# Patient Record
Sex: MALE | Race: WHITE | NOT HISPANIC OR LATINO | Employment: OTHER | URBAN - NONMETROPOLITAN AREA
[De-identification: names, ages, dates, MRNs, and addresses within clinical notes are randomized per-mention and may not be internally consistent; named-entity substitution may affect disease eponyms.]

---

## 2017-01-01 ENCOUNTER — APPOINTMENT (OUTPATIENT)
Dept: GENERAL RADIOLOGY | Facility: HOSPITAL | Age: 77
End: 2017-01-01

## 2017-01-01 ENCOUNTER — APPOINTMENT (OUTPATIENT)
Dept: CT IMAGING | Facility: HOSPITAL | Age: 77
End: 2017-01-01

## 2017-01-01 ENCOUNTER — OUTSIDE FACILITY SERVICE (OUTPATIENT)
Dept: FAMILY MEDICINE CLINIC | Facility: CLINIC | Age: 77
End: 2017-01-01

## 2017-01-01 ENCOUNTER — APPOINTMENT (OUTPATIENT)
Dept: SLEEP MEDICINE | Facility: HOSPITAL | Age: 77
End: 2017-01-01
Attending: FAMILY MEDICINE

## 2017-01-01 ENCOUNTER — HOSPITAL ENCOUNTER (EMERGENCY)
Facility: HOSPITAL | Age: 77
Discharge: LEFT BEFORE DISCHARGE | End: 2017-09-24
Attending: STUDENT IN AN ORGANIZED HEALTH CARE EDUCATION/TRAINING PROGRAM | Admitting: STUDENT IN AN ORGANIZED HEALTH CARE EDUCATION/TRAINING PROGRAM

## 2017-01-01 ENCOUNTER — HOSPITAL ENCOUNTER (INPATIENT)
Facility: HOSPITAL | Age: 77
LOS: 6 days | Discharge: NURSING, REHAB OR LONG-TERM CARE | End: 2017-10-17
Attending: EMERGENCY MEDICINE | Admitting: INTERNAL MEDICINE

## 2017-01-01 ENCOUNTER — HOSPITAL ENCOUNTER (EMERGENCY)
Facility: HOSPITAL | Age: 77
Discharge: NURSING, REHAB OR LONG-TERM CARE | End: 2017-10-19
Attending: EMERGENCY MEDICINE | Admitting: EMERGENCY MEDICINE

## 2017-01-01 ENCOUNTER — APPOINTMENT (OUTPATIENT)
Dept: CARDIOLOGY | Facility: HOSPITAL | Age: 77
End: 2017-01-01
Attending: INTERNAL MEDICINE

## 2017-01-01 ENCOUNTER — HOSPITAL ENCOUNTER (INPATIENT)
Facility: HOSPITAL | Age: 77
LOS: 1 days | End: 2017-10-21
Attending: EMERGENCY MEDICINE | Admitting: FAMILY MEDICINE

## 2017-01-01 VITALS
BODY MASS INDEX: 15.28 KG/M2 | DIASTOLIC BLOOD PRESSURE: 72 MMHG | HEART RATE: 72 BPM | WEIGHT: 106.7 LBS | HEIGHT: 70 IN | OXYGEN SATURATION: 98 % | RESPIRATION RATE: 18 BRPM | TEMPERATURE: 97.8 F | SYSTOLIC BLOOD PRESSURE: 100 MMHG

## 2017-01-01 VITALS
BODY MASS INDEX: 13.54 KG/M2 | WEIGHT: 100 LBS | HEIGHT: 72 IN | RESPIRATION RATE: 20 BRPM | HEART RATE: 92 BPM | TEMPERATURE: 97.4 F | DIASTOLIC BLOOD PRESSURE: 76 MMHG | SYSTOLIC BLOOD PRESSURE: 104 MMHG | OXYGEN SATURATION: 90 %

## 2017-01-01 VITALS
WEIGHT: 106.8 LBS | DIASTOLIC BLOOD PRESSURE: 71 MMHG | SYSTOLIC BLOOD PRESSURE: 96 MMHG | BODY MASS INDEX: 13.71 KG/M2 | HEART RATE: 92 BPM | TEMPERATURE: 97.6 F | HEIGHT: 74 IN | RESPIRATION RATE: 18 BRPM | OXYGEN SATURATION: 98 %

## 2017-01-01 VITALS
DIASTOLIC BLOOD PRESSURE: 36 MMHG | OXYGEN SATURATION: 58 % | HEART RATE: 30 BPM | SYSTOLIC BLOOD PRESSURE: 71 MMHG | RESPIRATION RATE: 17 BRPM | WEIGHT: 113.38 LBS | TEMPERATURE: 98 F | HEIGHT: 74 IN | BODY MASS INDEX: 14.55 KG/M2

## 2017-01-01 DIAGNOSIS — I46.9 CARDIAC ARREST (HCC): Primary | ICD-10-CM

## 2017-01-01 DIAGNOSIS — I95.9 TRANSIENT HYPOTENSION: Primary | ICD-10-CM

## 2017-01-01 DIAGNOSIS — R79.89 ELEVATED LACTIC ACID LEVEL: ICD-10-CM

## 2017-01-01 DIAGNOSIS — S01.01XA SCALP LACERATION, INITIAL ENCOUNTER: ICD-10-CM

## 2017-01-01 DIAGNOSIS — Z74.09 IMPAIRED FUNCTIONAL MOBILITY, BALANCE, GAIT, AND ENDURANCE: ICD-10-CM

## 2017-01-01 DIAGNOSIS — J18.9 PNEUMONIA OF LEFT LOWER LOBE DUE TO INFECTIOUS ORGANISM: ICD-10-CM

## 2017-01-01 DIAGNOSIS — Z78.9 IMPAIRED MOBILITY AND ADLS: ICD-10-CM

## 2017-01-01 DIAGNOSIS — Z74.09 IMPAIRED MOBILITY AND ADLS: ICD-10-CM

## 2017-01-01 DIAGNOSIS — I21.4 NSTEMI (NON-ST ELEVATED MYOCARDIAL INFARCTION) (HCC): ICD-10-CM

## 2017-01-01 DIAGNOSIS — J18.9 PNEUMONIA OF RIGHT LOWER LOBE DUE TO INFECTIOUS ORGANISM: ICD-10-CM

## 2017-01-01 DIAGNOSIS — R64 CACHEXIA (HCC): ICD-10-CM

## 2017-01-01 DIAGNOSIS — J18.9 PNEUMONIA OF BOTH LOWER LOBES DUE TO INFECTIOUS ORGANISM: ICD-10-CM

## 2017-01-01 DIAGNOSIS — G93.40 ACUTE ENCEPHALOPATHY: Primary | ICD-10-CM

## 2017-01-01 DIAGNOSIS — S09.90XA HEAD INJURY DUE TO TRAUMA, INITIAL ENCOUNTER: ICD-10-CM

## 2017-01-01 DIAGNOSIS — R55 SYNCOPE AND COLLAPSE: Primary | ICD-10-CM

## 2017-01-01 LAB
ACTH PLAS-MCNC: 38.3 PG/ML (ref 7.2–63.3)
ALBUMIN SERPL-MCNC: 2.1 G/DL (ref 3.5–5)
ALBUMIN SERPL-MCNC: 2.3 G/DL (ref 3.5–5)
ALBUMIN SERPL-MCNC: 2.3 G/DL (ref 3.5–5)
ALBUMIN SERPL-MCNC: 2.4 G/DL (ref 3.5–5)
ALBUMIN SERPL-MCNC: 2.6 G/DL (ref 3.5–5)
ALBUMIN SERPL-MCNC: 2.7 G/DL (ref 3.5–5)
ALBUMIN SERPL-MCNC: 2.9 G/DL (ref 3.5–5)
ALBUMIN SERPL-MCNC: 3.1 G/DL (ref 3.5–5)
ALBUMIN SERPL-MCNC: 3.6 G/DL (ref 3.5–5)
ALBUMIN/GLOB SERPL: 0.8 G/DL (ref 1–2)
ALBUMIN/GLOB SERPL: 0.9 G/DL (ref 1–2)
ALP SERPL-CCNC: 129 U/L (ref 38–126)
ALP SERPL-CCNC: 132 U/L (ref 38–126)
ALP SERPL-CCNC: 147 U/L (ref 38–126)
ALP SERPL-CCNC: 153 U/L (ref 38–126)
ALP SERPL-CCNC: 99 U/L (ref 38–126)
ALT SERPL W P-5'-P-CCNC: 31 U/L (ref 13–69)
ALT SERPL W P-5'-P-CCNC: 36 U/L (ref 13–69)
ALT SERPL W P-5'-P-CCNC: 37 U/L (ref 13–69)
ALT SERPL W P-5'-P-CCNC: 52 U/L (ref 13–69)
ALT SERPL W P-5'-P-CCNC: 74 U/L (ref 13–69)
AMMONIA BLD-SCNC: <9 UMOL/L (ref 9–30)
ANION GAP SERPL CALCULATED.3IONS-SCNC: 10 MMOL/L
ANION GAP SERPL CALCULATED.3IONS-SCNC: 10.1 MMOL/L
ANION GAP SERPL CALCULATED.3IONS-SCNC: 10.6 MMOL/L
ANION GAP SERPL CALCULATED.3IONS-SCNC: 11.2 MMOL/L
ANION GAP SERPL CALCULATED.3IONS-SCNC: 16.1 MMOL/L
ANION GAP SERPL CALCULATED.3IONS-SCNC: 16.1 MMOL/L
ANION GAP SERPL CALCULATED.3IONS-SCNC: 16.5 MMOL/L
ANION GAP SERPL CALCULATED.3IONS-SCNC: 17.9 MMOL/L
ANION GAP SERPL CALCULATED.3IONS-SCNC: 19.1 MMOL/L
ANION GAP SERPL CALCULATED.3IONS-SCNC: 8.2 MMOL/L
ANION GAP SERPL CALCULATED.3IONS-SCNC: 9.6 MMOL/L
ANISOCYTOSIS BLD QL: ABNORMAL
ANISOCYTOSIS BLD QL: ABNORMAL
APAP SERPL-MCNC: <10 MCG/ML
APTT PPP: 21.4 SECONDS (ref 25–36)
ARTERIAL PATENCY WRIST A: ABNORMAL
ARTERIAL PATENCY WRIST A: NORMAL
ARTERIAL PATENCY WRIST A: POSITIVE
AST SERPL-CCNC: 107 U/L (ref 15–46)
AST SERPL-CCNC: 26 U/L (ref 15–46)
AST SERPL-CCNC: 35 U/L (ref 15–46)
AST SERPL-CCNC: 45 U/L (ref 15–46)
AST SERPL-CCNC: 57 U/L (ref 15–46)
BACTERIA SPEC AEROBE CULT: ABNORMAL
BACTERIA SPEC AEROBE CULT: ABNORMAL
BACTERIA SPEC AEROBE CULT: NORMAL
BACTERIA UR QL AUTO: ABNORMAL /HPF
BASE EXCESS BLDA CALC-SCNC: -0.3 MMOL/L
BASE EXCESS BLDA CALC-SCNC: -2.6 MMOL/L
BASE EXCESS BLDA CALC-SCNC: -3 MMOL/L
BASE EXCESS BLDA CALC-SCNC: 0.6 MMOL/L
BASE EXCESS BLDA CALC-SCNC: 2.1 MMOL/L
BASOPHILS # BLD AUTO: 0.01 10*3/MM3 (ref 0–0.2)
BASOPHILS # BLD AUTO: 0.02 10*3/MM3 (ref 0–0.2)
BASOPHILS # BLD AUTO: 0.02 10*3/MM3 (ref 0–0.2)
BASOPHILS # BLD AUTO: 0.03 10*3/MM3 (ref 0–0.2)
BASOPHILS NFR BLD AUTO: 0.1 % (ref 0–2.5)
BASOPHILS NFR BLD AUTO: 0.2 % (ref 0–2.5)
BASOPHILS NFR BLD AUTO: 0.2 % (ref 0–2.5)
BDY SITE: ABNORMAL
BDY SITE: NORMAL
BH CV ECHO MEAS - % IVS THICK: 55 %
BH CV ECHO MEAS - % LVPW THICK: -14.3 %
BH CV ECHO MEAS - AO ACC SLOPE: 700.9 CM/SEC^2
BH CV ECHO MEAS - AO ACC TIME: 0.08 SEC
BH CV ECHO MEAS - AO ROOT AREA (BSA CORRECTED): 2.3
BH CV ECHO MEAS - AO ROOT AREA: 10 CM^2
BH CV ECHO MEAS - AO ROOT DIAM: 3.6 CM
BH CV ECHO MEAS - BSA(HAYCOCK): 1.4 M^2
BH CV ECHO MEAS - BSA: 1.6 M^2
BH CV ECHO MEAS - BZI_BMI: 11.9 KILOGRAMS/M^2
BH CV ECHO MEAS - BZI_METRIC_HEIGHT: 188 CM
BH CV ECHO MEAS - BZI_METRIC_WEIGHT: 42.2 KG
BH CV ECHO MEAS - CONTRAST EF 4CH: 44 ML/M^2
BH CV ECHO MEAS - EDV(CUBED): 71.4 ML
BH CV ECHO MEAS - EDV(MOD-SP4): 84 ML
BH CV ECHO MEAS - EDV(TEICH): 76.3 ML
BH CV ECHO MEAS - EF(CUBED): 59.5 %
BH CV ECHO MEAS - EF(TEICH): 51.6 %
BH CV ECHO MEAS - ESV(CUBED): 28.9 ML
BH CV ECHO MEAS - ESV(MOD-SP4): 47 ML
BH CV ECHO MEAS - ESV(TEICH): 37 ML
BH CV ECHO MEAS - FS: 26 %
BH CV ECHO MEAS - IVS/LVPW: 0.95
BH CV ECHO MEAS - IVSD: 0.86 CM
BH CV ECHO MEAS - IVSS: 1.3 CM
BH CV ECHO MEAS - LA DIMENSION: 2.8 CM
BH CV ECHO MEAS - LA/AO: 0.79
BH CV ECHO MEAS - LV DIASTOLIC VOL/BSA (35-75): 53.5 ML/M^2
BH CV ECHO MEAS - LV MASS(C)D: 113.8 GRAMS
BH CV ECHO MEAS - LV MASS(C)DI: 72.5 GRAMS/M^2
BH CV ECHO MEAS - LV MASS(C)S: 92.6 GRAMS
BH CV ECHO MEAS - LV MASS(C)SI: 59 GRAMS/M^2
BH CV ECHO MEAS - LV MAX PG: 1.2 MMHG
BH CV ECHO MEAS - LV MEAN PG: 0.64 MMHG
BH CV ECHO MEAS - LV SYSTOLIC VOL/BSA (12-30): 29.9 ML/M^2
BH CV ECHO MEAS - LV V1 MAX: 54.3 CM/SEC
BH CV ECHO MEAS - LV V1 MEAN: 37.9 CM/SEC
BH CV ECHO MEAS - LV V1 VTI: 10.1 CM
BH CV ECHO MEAS - LVIDD: 4.1 CM
BH CV ECHO MEAS - LVIDS: 3.1 CM
BH CV ECHO MEAS - LVLD AP4: 7.6 CM
BH CV ECHO MEAS - LVLS AP4: 6.9 CM
BH CV ECHO MEAS - LVOT AREA (M): 3.1 CM^2
BH CV ECHO MEAS - LVOT AREA: 3.1 CM^2
BH CV ECHO MEAS - LVOT DIAM: 2 CM
BH CV ECHO MEAS - LVPWD: 0.91 CM
BH CV ECHO MEAS - LVPWS: 0.78 CM
BH CV ECHO MEAS - MV A MAX VEL: 87.9 CM/SEC
BH CV ECHO MEAS - MV DEC SLOPE: 567.5 CM/SEC^2
BH CV ECHO MEAS - MV E MAX VEL: 43.4 CM/SEC
BH CV ECHO MEAS - MV E/A: 0.49
BH CV ECHO MEAS - MV MAX PG: 3.8 MMHG
BH CV ECHO MEAS - MV MEAN PG: 1.3 MMHG
BH CV ECHO MEAS - MV P1/2T MAX VEL: 53.2 CM/SEC
BH CV ECHO MEAS - MV P1/2T: 27.5 MSEC
BH CV ECHO MEAS - MV V2 MAX: 97.7 CM/SEC
BH CV ECHO MEAS - MV V2 MEAN: 51.6 CM/SEC
BH CV ECHO MEAS - MV V2 VTI: 14.5 CM
BH CV ECHO MEAS - MVA P1/2T LCG: 4.1 CM^2
BH CV ECHO MEAS - MVA(P1/2T): 8 CM^2
BH CV ECHO MEAS - MVA(VTI): 2.2 CM^2
BH CV ECHO MEAS - PA MAX PG: 2 MMHG
BH CV ECHO MEAS - PA MEAN PG: 1.4 MMHG
BH CV ECHO MEAS - PA V2 MAX: 70.6 CM/SEC
BH CV ECHO MEAS - PA V2 MEAN: 56.7 CM/SEC
BH CV ECHO MEAS - PA V2 VTI: 12 CM
BH CV ECHO MEAS - SI(CUBED): 27.1 ML/M^2
BH CV ECHO MEAS - SI(LVOT): 20.2 ML/M^2
BH CV ECHO MEAS - SI(MOD-SP4): 23.6 ML/M^2
BH CV ECHO MEAS - SI(TEICH): 25.1 ML/M^2
BH CV ECHO MEAS - SV(CUBED): 42.5 ML
BH CV ECHO MEAS - SV(LVOT): 31.6 ML
BH CV ECHO MEAS - SV(MOD-SP4): 37 ML
BH CV ECHO MEAS - SV(TEICH): 39.3 ML
BH CV ECHO MEAS - TR MAX VEL: 289.6 CM/SEC
BILIRUB SERPL-MCNC: 0.3 MG/DL (ref 0.2–1.3)
BILIRUB SERPL-MCNC: 0.4 MG/DL (ref 0.2–1.3)
BILIRUB SERPL-MCNC: 0.4 MG/DL (ref 0.2–1.3)
BILIRUB SERPL-MCNC: 0.9 MG/DL (ref 0.2–1.3)
BILIRUB SERPL-MCNC: 0.9 MG/DL (ref 0.2–1.3)
BILIRUB UR QL STRIP: NEGATIVE
BUN BLD-MCNC: 21 MG/DL (ref 7–20)
BUN BLD-MCNC: 23 MG/DL (ref 7–20)
BUN BLD-MCNC: 24 MG/DL (ref 7–20)
BUN BLD-MCNC: 25 MG/DL (ref 7–20)
BUN BLD-MCNC: 31 MG/DL (ref 7–20)
BUN BLD-MCNC: 33 MG/DL (ref 7–20)
BUN BLD-MCNC: 37 MG/DL (ref 7–20)
BUN BLD-MCNC: 50 MG/DL (ref 7–20)
BUN BLD-MCNC: 51 MG/DL (ref 7–20)
BUN/CREAT SERPL: 21 (ref 6.3–21.9)
BUN/CREAT SERPL: 30 (ref 6.3–21.9)
BUN/CREAT SERPL: 30 (ref 6.3–21.9)
BUN/CREAT SERPL: 32.9 (ref 6.3–21.9)
BUN/CREAT SERPL: 35.7 (ref 6.3–21.9)
BUN/CREAT SERPL: 38.8 (ref 6.3–21.9)
BUN/CREAT SERPL: 40 (ref 6.3–21.9)
BUN/CREAT SERPL: 41.3 (ref 6.3–21.9)
BUN/CREAT SERPL: 46.3 (ref 6.3–21.9)
BUN/CREAT SERPL: 50 (ref 6.3–21.9)
BUN/CREAT SERPL: 51 (ref 6.3–21.9)
C DIFF TOX A+B STL QL IA: NEGATIVE
CALCIUM SPEC-SCNC: 8.9 MG/DL (ref 8.4–10.2)
CALCIUM SPEC-SCNC: 8.9 MG/DL (ref 8.4–10.2)
CALCIUM SPEC-SCNC: 9.1 MG/DL (ref 8.4–10.2)
CALCIUM SPEC-SCNC: 9.3 MG/DL (ref 8.4–10.2)
CALCIUM SPEC-SCNC: 9.5 MG/DL (ref 8.4–10.2)
CALCIUM SPEC-SCNC: 9.5 MG/DL (ref 8.4–10.2)
CALCIUM SPEC-SCNC: 9.7 MG/DL (ref 8.4–10.2)
CALCIUM SPEC-SCNC: 9.7 MG/DL (ref 8.4–10.2)
CHLORIDE SERPL-SCNC: 104 MMOL/L (ref 98–107)
CHLORIDE SERPL-SCNC: 106 MMOL/L (ref 98–107)
CHLORIDE SERPL-SCNC: 107 MMOL/L (ref 98–107)
CHLORIDE SERPL-SCNC: 111 MMOL/L (ref 98–107)
CHLORIDE SERPL-SCNC: 113 MMOL/L (ref 98–107)
CHLORIDE SERPL-SCNC: 115 MMOL/L (ref 98–107)
CHLORIDE SERPL-SCNC: 97 MMOL/L (ref 98–107)
CK SERPL-CCNC: 35 U/L (ref 30–170)
CK SERPL-CCNC: 46 U/L (ref 30–170)
CLARITY UR: ABNORMAL
CO2 SERPL-SCNC: 22 MMOL/L (ref 26–30)
CO2 SERPL-SCNC: 23 MMOL/L (ref 26–30)
CO2 SERPL-SCNC: 24 MMOL/L (ref 26–30)
CO2 SERPL-SCNC: 24 MMOL/L (ref 26–30)
CO2 SERPL-SCNC: 25 MMOL/L (ref 26–30)
CO2 SERPL-SCNC: 27 MMOL/L (ref 26–30)
CO2 SERPL-SCNC: 28 MMOL/L (ref 26–30)
CO2 SERPL-SCNC: 29 MMOL/L (ref 26–30)
COHGB MFR BLD: 0.7 %
COHGB MFR BLD: 0.8 %
COHGB MFR BLD: 0.9 %
COHGB MFR BLD: 1 %
COHGB MFR BLD: 1.2 %
COLOR UR: YELLOW
CORTIS SERPL-MCNC: 24 UG/DL
CREAT BLD-MCNC: 0.6 MG/DL (ref 0.6–1.3)
CREAT BLD-MCNC: 0.7 MG/DL (ref 0.6–1.3)
CREAT BLD-MCNC: 0.8 MG/DL (ref 0.6–1.3)
CREAT BLD-MCNC: 1 MG/DL (ref 0.6–1.3)
D-LACTATE SERPL-SCNC: 2.2 MMOL/L (ref 0.5–2)
D-LACTATE SERPL-SCNC: 2.6 MMOL/L (ref 0.5–2)
D-LACTATE SERPL-SCNC: 2.7 MMOL/L (ref 0.5–2)
D-LACTATE SERPL-SCNC: 4.3 MMOL/L (ref 0.5–2)
D-LACTATE SERPL-SCNC: 5.1 MMOL/L (ref 0.5–2)
D-LACTATE SERPL-SCNC: 7.9 MMOL/L (ref 0.5–2)
DEPRECATED RDW RBC AUTO: 46.5 FL (ref 37–54)
DEPRECATED RDW RBC AUTO: 50.2 FL (ref 37–54)
DEPRECATED RDW RBC AUTO: 50.4 FL (ref 37–54)
DEPRECATED RDW RBC AUTO: 51 FL (ref 37–54)
DEPRECATED RDW RBC AUTO: 51.6 FL (ref 37–54)
DEPRECATED RDW RBC AUTO: 51.8 FL (ref 37–54)
DEPRECATED RDW RBC AUTO: 53.4 FL (ref 37–54)
DEPRECATED RDW RBC AUTO: 53.7 FL (ref 37–54)
DEPRECATED RDW RBC AUTO: 55.9 FL (ref 37–54)
DEPRECATED RDW RBC AUTO: 59.4 FL (ref 37–54)
EOSINOPHIL # BLD AUTO: 0 10*3/MM3 (ref 0–0.7)
EOSINOPHIL NFR BLD AUTO: 0 % (ref 0–7)
ERYTHROCYTE [DISTWIDTH] IN BLOOD BY AUTOMATED COUNT: 13.8 % (ref 11.5–14.5)
ERYTHROCYTE [DISTWIDTH] IN BLOOD BY AUTOMATED COUNT: 16 % (ref 11.5–14.5)
ERYTHROCYTE [DISTWIDTH] IN BLOOD BY AUTOMATED COUNT: 16.1 % (ref 11.5–14.5)
ERYTHROCYTE [DISTWIDTH] IN BLOOD BY AUTOMATED COUNT: 16.2 % (ref 11.5–14.5)
ERYTHROCYTE [DISTWIDTH] IN BLOOD BY AUTOMATED COUNT: 16.2 % (ref 11.5–14.5)
ERYTHROCYTE [DISTWIDTH] IN BLOOD BY AUTOMATED COUNT: 16.4 % (ref 11.5–14.5)
ERYTHROCYTE [DISTWIDTH] IN BLOOD BY AUTOMATED COUNT: 16.5 % (ref 11.5–14.5)
ERYTHROCYTE [DISTWIDTH] IN BLOOD BY AUTOMATED COUNT: 16.6 % (ref 11.5–14.5)
ERYTHROCYTE [DISTWIDTH] IN BLOOD BY AUTOMATED COUNT: 17.3 % (ref 11.5–14.5)
ERYTHROCYTE [DISTWIDTH] IN BLOOD BY AUTOMATED COUNT: 17.3 % (ref 11.5–14.5)
FERRITIN SERPL-MCNC: 1120 NG/ML (ref 17.9–464)
FLUAV AG NPH QL: NEGATIVE
FLUBV AG NPH QL IA: NEGATIVE
FOLATE SERPL-MCNC: 11.2 NG/ML
FOLATE SERPL-MCNC: 9.34 NG/ML
GFR SERPL CREATININE-BSD FRML MDRD: 109 ML/MIN/1.73
GFR SERPL CREATININE-BSD FRML MDRD: 131 ML/MIN/1.73
GFR SERPL CREATININE-BSD FRML MDRD: 73 ML/MIN/1.73
GFR SERPL CREATININE-BSD FRML MDRD: 94 ML/MIN/1.73
GLOBULIN UR ELPH-MCNC: 2.4 GM/DL
GLOBULIN UR ELPH-MCNC: 2.8 GM/DL
GLOBULIN UR ELPH-MCNC: 3.1 GM/DL
GLOBULIN UR ELPH-MCNC: 3.3 GM/DL
GLOBULIN UR ELPH-MCNC: 3.8 GM/DL
GLUCOSE BLD-MCNC: 119 MG/DL (ref 74–98)
GLUCOSE BLD-MCNC: 128 MG/DL (ref 74–98)
GLUCOSE BLD-MCNC: 147 MG/DL (ref 74–98)
GLUCOSE BLD-MCNC: 184 MG/DL (ref 74–98)
GLUCOSE BLD-MCNC: 195 MG/DL (ref 74–98)
GLUCOSE BLD-MCNC: 75 MG/DL (ref 74–98)
GLUCOSE BLD-MCNC: 80 MG/DL (ref 74–98)
GLUCOSE BLD-MCNC: 81 MG/DL (ref 74–98)
GLUCOSE BLD-MCNC: 83 MG/DL (ref 74–98)
GLUCOSE BLD-MCNC: 89 MG/DL (ref 74–98)
GLUCOSE BLD-MCNC: 90 MG/DL (ref 74–98)
GLUCOSE UR STRIP-MCNC: NEGATIVE MG/DL
GRAM STN SPEC: ABNORMAL
GRAM STN SPEC: ABNORMAL
HCO3 BLDA-SCNC: 21.7 MMOL/L (ref 22–28)
HCO3 BLDA-SCNC: 22.3 MMOL/L (ref 22–28)
HCO3 BLDA-SCNC: 23.3 MMOL/L (ref 22–28)
HCO3 BLDA-SCNC: 23.5 MMOL/L (ref 22–28)
HCO3 BLDA-SCNC: 25.7 MMOL/L (ref 22–28)
HCT VFR BLD AUTO: 30.4 % (ref 42–52)
HCT VFR BLD AUTO: 31.1 % (ref 42–52)
HCT VFR BLD AUTO: 31.8 % (ref 42–52)
HCT VFR BLD AUTO: 33.1 % (ref 42–52)
HCT VFR BLD AUTO: 33.7 % (ref 42–52)
HCT VFR BLD AUTO: 37 % (ref 42–52)
HCT VFR BLD AUTO: 38 % (ref 42–52)
HCT VFR BLD AUTO: 38.4 % (ref 42–52)
HCT VFR BLD AUTO: 38.9 % (ref 42–52)
HCT VFR BLD AUTO: 40.5 % (ref 42–52)
HEMOCCULT STL QL: NEGATIVE
HGB BLD-MCNC: 10.4 G/DL (ref 14–18)
HGB BLD-MCNC: 10.9 G/DL (ref 14–18)
HGB BLD-MCNC: 11.7 G/DL (ref 14–18)
HGB BLD-MCNC: 12.1 G/DL (ref 14–18)
HGB BLD-MCNC: 12.3 G/DL (ref 14–18)
HGB BLD-MCNC: 12.4 G/DL (ref 14–18)
HGB BLD-MCNC: 12.7 G/DL (ref 14–18)
HGB BLD-MCNC: 9.3 G/DL (ref 14–18)
HGB BLD-MCNC: 9.6 G/DL (ref 14–18)
HGB BLD-MCNC: 9.9 G/DL (ref 14–18)
HGB BLDA-MCNC: 10.6 G/DL (ref 12–18)
HGB BLDA-MCNC: 11.6 G/DL (ref 12–18)
HGB BLDA-MCNC: 12 G/DL (ref 12–18)
HGB BLDA-MCNC: 12.2 G/DL (ref 12–18)
HGB BLDA-MCNC: 12.6 G/DL (ref 12–18)
HGB UR QL STRIP.AUTO: ABNORMAL
HOLD SPECIMEN: NORMAL
HOROWITZ INDEX BLD+IHG-RTO: 100 %
HOROWITZ INDEX BLD+IHG-RTO: 100 %
HOROWITZ INDEX BLD+IHG-RTO: 28 %
HOROWITZ INDEX BLD+IHG-RTO: 30 %
HOROWITZ INDEX BLD+IHG-RTO: 65 %
HYALINE CASTS UR QL AUTO: ABNORMAL /LPF
IMM GRANULOCYTES # BLD: 0.06 10*3/MM3 (ref 0–0.06)
IMM GRANULOCYTES # BLD: 0.06 10*3/MM3 (ref 0–0.06)
IMM GRANULOCYTES # BLD: 0.07 10*3/MM3 (ref 0–0.06)
IMM GRANULOCYTES # BLD: 0.1 10*3/MM3 (ref 0–0.06)
IMM GRANULOCYTES # BLD: 0.18 10*3/MM3 (ref 0–0.06)
IMM GRANULOCYTES # BLD: 0.21 10*3/MM3 (ref 0–0.06)
IMM GRANULOCYTES # BLD: 0.36 10*3/MM3 (ref 0–0.06)
IMM GRANULOCYTES NFR BLD: 0.5 % (ref 0–0.6)
IMM GRANULOCYTES NFR BLD: 0.5 % (ref 0–0.6)
IMM GRANULOCYTES NFR BLD: 0.6 % (ref 0–0.6)
IMM GRANULOCYTES NFR BLD: 1.2 % (ref 0–0.6)
IMM GRANULOCYTES NFR BLD: 1.5 % (ref 0–0.6)
IMM GRANULOCYTES NFR BLD: 1.7 % (ref 0–0.6)
IMM GRANULOCYTES NFR BLD: 2.2 % (ref 0–0.6)
INR PPP: 1.58 (ref 0.9–1.1)
IRON 24H UR-MRATE: <10 MCG/DL (ref 37–181)
IRON SATN MFR SERPL: ABNORMAL % (ref 11–46)
ISOLATED FROM: ABNORMAL
KETONES UR QL STRIP: NEGATIVE
LARGE PLATELETS: ABNORMAL
LEUKOCYTE ESTERASE UR QL STRIP.AUTO: NEGATIVE
LIPASE SERPL-CCNC: 70 U/L (ref 23–300)
LYMPHOCYTES # BLD AUTO: 0.19 10*3/MM3 (ref 0.6–3.4)
LYMPHOCYTES # BLD AUTO: 0.23 10*3/MM3 (ref 0.6–3.4)
LYMPHOCYTES # BLD AUTO: 0.25 10*3/MM3 (ref 0.6–3.4)
LYMPHOCYTES # BLD AUTO: 0.27 10*3/MM3 (ref 0.6–3.4)
LYMPHOCYTES # BLD AUTO: 0.3 10*3/MM3 (ref 0.6–3.4)
LYMPHOCYTES # BLD AUTO: 0.31 10*3/MM3 (ref 0.6–3.4)
LYMPHOCYTES # BLD AUTO: 0.68 10*3/MM3 (ref 0.6–3.4)
LYMPHOCYTES # BLD MANUAL: 0.1 10*3/MM3 (ref 0.6–3.4)
LYMPHOCYTES # BLD MANUAL: 0.55 10*3/MM3 (ref 0.6–3.4)
LYMPHOCYTES # BLD MANUAL: 1.72 10*3/MM3 (ref 0.6–3.4)
LYMPHOCYTES NFR BLD AUTO: 1.4 % (ref 10–50)
LYMPHOCYTES NFR BLD AUTO: 1.9 % (ref 10–50)
LYMPHOCYTES NFR BLD AUTO: 2.4 % (ref 10–50)
LYMPHOCYTES NFR BLD AUTO: 2.5 % (ref 10–50)
LYMPHOCYTES NFR BLD AUTO: 2.8 % (ref 10–50)
LYMPHOCYTES NFR BLD AUTO: 3.3 % (ref 10–50)
LYMPHOCYTES NFR BLD AUTO: 3.7 % (ref 10–50)
LYMPHOCYTES NFR BLD MANUAL: 1 % (ref 10–50)
LYMPHOCYTES NFR BLD MANUAL: 18.4 % (ref 10–50)
LYMPHOCYTES NFR BLD MANUAL: 2 % (ref 0–12)
LYMPHOCYTES NFR BLD MANUAL: 6 % (ref 10–50)
LYMPHOCYTES NFR BLD MANUAL: 6.1 % (ref 0–12)
LYMPHOCYTES NFR BLD MANUAL: 8 % (ref 0–12)
MAGNESIUM SERPL-MCNC: 2 MG/DL (ref 1.6–2.3)
MAGNESIUM SERPL-MCNC: 2.1 MG/DL (ref 1.6–2.3)
MAGNESIUM SERPL-MCNC: 2.1 MG/DL (ref 1.6–2.3)
MAGNESIUM SERPL-MCNC: 2.3 MG/DL (ref 1.6–2.3)
MAGNESIUM SERPL-MCNC: 2.4 MG/DL (ref 1.6–2.3)
MCH RBC QN AUTO: 28.1 PG (ref 27–31)
MCH RBC QN AUTO: 28.1 PG (ref 27–31)
MCH RBC QN AUTO: 28.2 PG (ref 27–31)
MCH RBC QN AUTO: 28.2 PG (ref 27–31)
MCH RBC QN AUTO: 28.3 PG (ref 27–31)
MCH RBC QN AUTO: 28.3 PG (ref 27–31)
MCH RBC QN AUTO: 28.5 PG (ref 27–31)
MCH RBC QN AUTO: 28.5 PG (ref 27–31)
MCH RBC QN AUTO: 28.6 PG (ref 27–31)
MCH RBC QN AUTO: 28.9 PG (ref 27–31)
MCHC RBC AUTO-ENTMCNC: 29.9 G/DL (ref 30–37)
MCHC RBC AUTO-ENTMCNC: 31.1 G/DL (ref 30–37)
MCHC RBC AUTO-ENTMCNC: 31.1 G/DL (ref 30–37)
MCHC RBC AUTO-ENTMCNC: 31.4 G/DL (ref 30–37)
MCHC RBC AUTO-ENTMCNC: 31.4 G/DL (ref 30–37)
MCHC RBC AUTO-ENTMCNC: 31.6 G/DL (ref 30–37)
MCHC RBC AUTO-ENTMCNC: 31.6 G/DL (ref 30–37)
MCHC RBC AUTO-ENTMCNC: 32 G/DL (ref 30–37)
MCHC RBC AUTO-ENTMCNC: 32.3 G/DL (ref 30–37)
MCHC RBC AUTO-ENTMCNC: 32.6 G/DL (ref 30–37)
MCV RBC AUTO: 87.3 FL (ref 80–94)
MCV RBC AUTO: 87.9 FL (ref 80–94)
MCV RBC AUTO: 88.6 FL (ref 80–94)
MCV RBC AUTO: 88.9 FL (ref 80–94)
MCV RBC AUTO: 89.7 FL (ref 80–94)
MCV RBC AUTO: 90.2 FL (ref 80–94)
MCV RBC AUTO: 90.9 FL (ref 80–94)
MCV RBC AUTO: 91 FL (ref 80–94)
MCV RBC AUTO: 92 FL (ref 80–94)
MCV RBC AUTO: 94.5 FL (ref 80–94)
METAMYELOCYTES NFR BLD MANUAL: 1 % (ref 0–0)
METHGB BLD QL: 0.6 %
METHGB BLD QL: 0.7 %
METHGB BLD QL: 0.8 %
METHGB BLD QL: 0.9 %
METHGB BLD QL: 1 %
MODALITY: ABNORMAL
MODALITY: NORMAL
MONOCYTES # BLD AUTO: 0.2 10*3/MM3 (ref 0–0.9)
MONOCYTES # BLD AUTO: 0.49 10*3/MM3 (ref 0–0.9)
MONOCYTES # BLD AUTO: 0.54 10*3/MM3 (ref 0–0.9)
MONOCYTES # BLD AUTO: 0.57 10*3/MM3 (ref 0–0.9)
MONOCYTES # BLD AUTO: 0.73 10*3/MM3 (ref 0–0.9)
MONOCYTES # BLD AUTO: 0.76 10*3/MM3 (ref 0–0.9)
MONOCYTES # BLD AUTO: 0.78 10*3/MM3 (ref 0–0.9)
MONOCYTES # BLD AUTO: 0.79 10*3/MM3 (ref 0–0.9)
MONOCYTES # BLD AUTO: 0.8 10*3/MM3 (ref 0–0.9)
MONOCYTES # BLD AUTO: 1.48 10*3/MM3 (ref 0–0.9)
MONOCYTES NFR BLD AUTO: 4.7 % (ref 0–12)
MONOCYTES NFR BLD AUTO: 6 % (ref 0–12)
MONOCYTES NFR BLD AUTO: 6.1 % (ref 0–12)
MONOCYTES NFR BLD AUTO: 6.5 % (ref 0–12)
MONOCYTES NFR BLD AUTO: 6.7 % (ref 0–12)
MONOCYTES NFR BLD AUTO: 7.5 % (ref 0–12)
MONOCYTES NFR BLD AUTO: 8 % (ref 0–12)
NEUTROPHILS # BLD AUTO: 10.9 10*3/MM3 (ref 2–6.9)
NEUTROPHILS # BLD AUTO: 12.17 10*3/MM3 (ref 2–6.9)
NEUTROPHILS # BLD AUTO: 21.82 10*3/MM3 (ref 2–6.9)
NEUTROPHILS # BLD AUTO: 7.08 10*3/MM3 (ref 2–6.9)
NEUTROPHILS # BLD AUTO: 7.1 10*3/MM3 (ref 2–6.9)
NEUTROPHILS # BLD AUTO: 7.79 10*3/MM3 (ref 2–6.9)
NEUTROPHILS # BLD AUTO: 8.21 10*3/MM3 (ref 2–6.9)
NEUTROPHILS # BLD AUTO: 9.42 10*3/MM3 (ref 2–6.9)
NEUTROPHILS # BLD AUTO: 9.62 10*3/MM3 (ref 2–6.9)
NEUTROPHILS # BLD AUTO: 9.84 10*3/MM3 (ref 2–6.9)
NEUTROPHILS NFR BLD AUTO: 86.4 % (ref 37–80)
NEUTROPHILS NFR BLD AUTO: 88.1 % (ref 37–80)
NEUTROPHILS NFR BLD AUTO: 88.3 % (ref 37–80)
NEUTROPHILS NFR BLD AUTO: 89.5 % (ref 37–80)
NEUTROPHILS NFR BLD AUTO: 91 % (ref 37–80)
NEUTROPHILS NFR BLD AUTO: 91.9 % (ref 37–80)
NEUTROPHILS NFR BLD AUTO: 92.2 % (ref 37–80)
NEUTROPHILS NFR BLD MANUAL: 56.1 % (ref 37–80)
NEUTROPHILS NFR BLD MANUAL: 70 % (ref 37–80)
NEUTROPHILS NFR BLD MANUAL: 70 % (ref 37–80)
NEUTS BAND NFR BLD MANUAL: 15 % (ref 0–6)
NEUTS BAND NFR BLD MANUAL: 19.4 % (ref 0–6)
NEUTS BAND NFR BLD MANUAL: 27 % (ref 0–6)
NITRITE UR QL STRIP: NEGATIVE
NRBC BLD MANUAL-RTO: 0 /100 WBC (ref 0–0)
NT-PROBNP SERPL-MCNC: 1120 PG/ML (ref 0–450)
OXYHGB MFR BLDV: 89.2 % (ref 94–99)
OXYHGB MFR BLDV: 94.5 % (ref 94–99)
OXYHGB MFR BLDV: 94.9 % (ref 94–99)
OXYHGB MFR BLDV: 95.5 % (ref 94–99)
OXYHGB MFR BLDV: 98.4 % (ref 94–99)
PCO2 BLDA: 29.2 MM HG (ref 35–45)
PCO2 BLDA: 32.3 MM HG (ref 35–45)
PCO2 BLDA: 32.8 MM HG (ref 35–45)
PCO2 BLDA: 35.1 MM HG (ref 35–45)
PCO2 BLDA: 38.6 MM HG (ref 35–45)
PH BLDA: 7.37 PH UNITS (ref 7.3–7.5)
PH BLDA: 7.43 PH UNITS (ref 7.3–7.5)
PH BLDA: 7.47 PH UNITS
PH BLDA: 7.47 PH UNITS (ref 7.3–7.5)
PH BLDA: 7.51 PH UNITS (ref 7.3–7.5)
PH UR STRIP.AUTO: <=5 [PH] (ref 5–8)
PHOSPHATE SERPL-MCNC: 2.1 MG/DL (ref 2.5–4.5)
PHOSPHATE SERPL-MCNC: 2.8 MG/DL (ref 2.5–4.5)
PHOSPHATE SERPL-MCNC: 3 MG/DL (ref 2.5–4.5)
PHOSPHATE SERPL-MCNC: 3.4 MG/DL (ref 2.5–4.5)
PLATELET # BLD AUTO: 102 10*3/MM3 (ref 130–400)
PLATELET # BLD AUTO: 102 10*3/MM3 (ref 130–400)
PLATELET # BLD AUTO: 125 10*3/MM3 (ref 130–400)
PLATELET # BLD AUTO: 280 10*3/MM3 (ref 130–400)
PLATELET # BLD AUTO: 65 10*3/MM3 (ref 130–400)
PLATELET # BLD AUTO: 69 10*3/MM3 (ref 130–400)
PLATELET # BLD AUTO: 69 10*3/MM3 (ref 130–400)
PLATELET # BLD AUTO: 71 10*3/MM3 (ref 130–400)
PLATELET # BLD AUTO: 80 10*3/MM3 (ref 130–400)
PLATELET # BLD AUTO: 99 10*3/MM3 (ref 130–400)
PMV BLD AUTO: 10.6 FL (ref 6–12)
PMV BLD AUTO: 11.7 FL (ref 6–12)
PMV BLD AUTO: 11.7 FL (ref 6–12)
PMV BLD AUTO: 11.8 FL (ref 6–12)
PMV BLD AUTO: 12.1 FL (ref 6–12)
PMV BLD AUTO: 12.3 FL (ref 6–12)
PMV BLD AUTO: 12.5 FL (ref 6–12)
PMV BLD AUTO: 12.6 FL (ref 6–12)
PMV BLD AUTO: 12.6 FL (ref 6–12)
PMV BLD AUTO: 12.9 FL (ref 6–12)
PO2 BLDA: 203 MM HG (ref 75–100)
PO2 BLDA: 63.6 MM HG (ref 75–100)
PO2 BLDA: 81.4 MM HG (ref 75–100)
PO2 BLDA: 82.3 MM HG (ref 75–100)
PO2 BLDA: 85.8 MM HG (ref 75–100)
POIKILOCYTOSIS BLD QL SMEAR: ABNORMAL
POTASSIUM BLD-SCNC: 3 MMOL/L (ref 3.5–5.1)
POTASSIUM BLD-SCNC: 3.1 MMOL/L (ref 3.5–5.1)
POTASSIUM BLD-SCNC: 3.1 MMOL/L (ref 3.5–5.1)
POTASSIUM BLD-SCNC: 3.2 MMOL/L (ref 3.5–5.1)
POTASSIUM BLD-SCNC: 3.2 MMOL/L (ref 3.5–5.1)
POTASSIUM BLD-SCNC: 3.6 MMOL/L (ref 3.5–5.1)
POTASSIUM BLD-SCNC: 3.6 MMOL/L (ref 3.5–5.1)
POTASSIUM BLD-SCNC: 4.1 MMOL/L (ref 3.5–5.1)
POTASSIUM BLD-SCNC: 4.5 MMOL/L (ref 3.5–5.1)
POTASSIUM BLD-SCNC: 4.9 MMOL/L (ref 3.5–5.1)
POTASSIUM BLD-SCNC: 5.1 MMOL/L (ref 3.5–5.1)
PROT SERPL-MCNC: 4.5 G/DL (ref 6.3–8.2)
PROT SERPL-MCNC: 5.1 G/DL (ref 6.3–8.2)
PROT SERPL-MCNC: 6 G/DL (ref 6.3–8.2)
PROT SERPL-MCNC: 6.4 G/DL (ref 6.3–8.2)
PROT SERPL-MCNC: 7.4 G/DL (ref 6.3–8.2)
PROT UR QL STRIP: ABNORMAL
PROTHROMBIN TIME: 17.3 SECONDS (ref 9.3–12.1)
RBC # BLD AUTO: 3.29 10*6/MM3 (ref 4.7–6.1)
RBC # BLD AUTO: 3.37 10*6/MM3 (ref 4.7–6.1)
RBC # BLD AUTO: 3.5 10*6/MM3 (ref 4.7–6.1)
RBC # BLD AUTO: 3.69 10*6/MM3 (ref 4.7–6.1)
RBC # BLD AUTO: 3.86 10*6/MM3 (ref 4.7–6.1)
RBC # BLD AUTO: 4.16 10*6/MM3 (ref 4.7–6.1)
RBC # BLD AUTO: 4.23 10*6/MM3 (ref 4.7–6.1)
RBC # BLD AUTO: 4.29 10*6/MM3 (ref 4.7–6.1)
RBC # BLD AUTO: 4.37 10*6/MM3 (ref 4.7–6.1)
RBC # BLD AUTO: 4.45 10*6/MM3 (ref 4.7–6.1)
RBC # UR: ABNORMAL /HPF
RBC MORPH BLD: NORMAL
REF LAB TEST METHOD: ABNORMAL
SALICYLATES SERPL-MCNC: <1 MG/DL (ref 2.8–20)
SAO2 % BLDCOA: 91 %
SAO2 % BLDCOA: 96.3 %
SAO2 % BLDCOA: 96.4 %
SAO2 % BLDCOA: 97 %
SCAN SLIDE: NORMAL
SMALL PLATELETS BLD QL SMEAR: ABNORMAL
SMALL PLATELETS BLD QL SMEAR: ABNORMAL
SMALL PLATELETS BLD QL SMEAR: ADEQUATE
SODIUM BLD-SCNC: 135 MMOL/L (ref 137–145)
SODIUM BLD-SCNC: 140 MMOL/L (ref 137–145)
SODIUM BLD-SCNC: 141 MMOL/L (ref 137–145)
SODIUM BLD-SCNC: 142 MMOL/L (ref 137–145)
SODIUM BLD-SCNC: 143 MMOL/L (ref 137–145)
SODIUM BLD-SCNC: 144 MMOL/L (ref 137–145)
SODIUM BLD-SCNC: 144 MMOL/L (ref 137–145)
SODIUM BLD-SCNC: 148 MMOL/L (ref 137–145)
SODIUM BLD-SCNC: 150 MMOL/L (ref 137–145)
SP GR UR STRIP: 1.02 (ref 1–1.03)
SQUAMOUS #/AREA URNS HPF: ABNORMAL /HPF
TIBC SERPL-MCNC: 183 MCG/DL (ref 261–497)
TOXIC GRANULATION: ABNORMAL
TROPONIN I SERPL-MCNC: 0 NG/ML (ref 0–0.05)
TROPONIN I SERPL-MCNC: 0.01 NG/ML (ref 0–0.03)
TROPONIN I SERPL-MCNC: 0.02 NG/ML (ref 0–0.03)
TROPONIN I SERPL-MCNC: 0.02 NG/ML (ref 0–0.03)
TROPONIN I SERPL-MCNC: 0.05 NG/ML (ref 0–0.03)
TROPONIN I SERPL-MCNC: 0.06 NG/ML (ref 0–0.05)
TROPONIN I SERPL-MCNC: <0.012 NG/ML (ref 0–0.03)
TSH SERPL DL<=0.05 MIU/L-ACNC: 4.24 MIU/ML (ref 0.47–4.68)
URATE CRY URNS QL MICRO: ABNORMAL /HPF
UROBILINOGEN UR QL STRIP: ABNORMAL
VANCOMYCIN TROUGH SERPL-MCNC: <5 MCG/ML (ref 5–15)
VIT B12 BLD-MCNC: >1000 PG/ML (ref 239–931)
VIT B12 BLD-MCNC: >1000 PG/ML (ref 239–931)
WBC MORPH BLD: NORMAL
WBC MORPH BLD: NORMAL
WBC NRBC COR # BLD: 10.14 10*3/MM3 (ref 4.8–10.8)
WBC NRBC COR # BLD: 10.43 10*3/MM3 (ref 4.8–10.8)
WBC NRBC COR # BLD: 10.69 10*3/MM3 (ref 4.8–10.8)
WBC NRBC COR # BLD: 11.98 10*3/MM3 (ref 4.8–10.8)
WBC NRBC COR # BLD: 13.24 10*3/MM3 (ref 4.8–10.8)
WBC NRBC COR # BLD: 24.37 10*3/MM3 (ref 4.8–10.8)
WBC NRBC COR # BLD: 8.05 10*3/MM3 (ref 4.8–10.8)
WBC NRBC COR # BLD: 9.17 10*3/MM3 (ref 4.8–10.8)
WBC NRBC COR # BLD: 9.37 10*3/MM3 (ref 4.8–10.8)
WBC NRBC COR # BLD: 9.5 10*3/MM3 (ref 4.8–10.8)
WBC UR QL AUTO: ABNORMAL /HPF
WHOLE BLOOD HOLD SPECIMEN: NORMAL

## 2017-01-01 PROCEDURE — 93005 ELECTROCARDIOGRAM TRACING: CPT | Performed by: PHYSICIAN ASSISTANT

## 2017-01-01 PROCEDURE — 5A12012 PERFORMANCE OF CARDIAC OUTPUT, SINGLE, MANUAL: ICD-10-PCS | Performed by: FAMILY MEDICINE

## 2017-01-01 PROCEDURE — 36600 WITHDRAWAL OF ARTERIAL BLOOD: CPT

## 2017-01-01 PROCEDURE — 94799 UNLISTED PULMONARY SVC/PX: CPT

## 2017-01-01 PROCEDURE — 84484 ASSAY OF TROPONIN QUANT: CPT | Performed by: INTERNAL MEDICINE

## 2017-01-01 PROCEDURE — 94660 CPAP INITIATION&MGMT: CPT

## 2017-01-01 PROCEDURE — 25010000002 AZITHROMYCIN: Performed by: INTERNAL MEDICINE

## 2017-01-01 PROCEDURE — 83605 ASSAY OF LACTIC ACID: CPT | Performed by: EMERGENCY MEDICINE

## 2017-01-01 PROCEDURE — 25010000002 KETOROLAC TROMETHAMINE PER 15 MG: Performed by: FAMILY MEDICINE

## 2017-01-01 PROCEDURE — 83735 ASSAY OF MAGNESIUM: CPT | Performed by: INTERNAL MEDICINE

## 2017-01-01 PROCEDURE — 25010000002 PIPERACILLIN SOD-TAZOBACTAM PER 1 G: Performed by: FAMILY MEDICINE

## 2017-01-01 PROCEDURE — 80053 COMPREHEN METABOLIC PANEL: CPT | Performed by: INTERNAL MEDICINE

## 2017-01-01 PROCEDURE — 71020 HC CHEST PA AND LATERAL: CPT

## 2017-01-01 PROCEDURE — 82375 ASSAY CARBOXYHB QUANT: CPT

## 2017-01-01 PROCEDURE — 99233 SBSQ HOSP IP/OBS HIGH 50: CPT | Performed by: INTERNAL MEDICINE

## 2017-01-01 PROCEDURE — 25010000002 PIPERACILLIN SOD-TAZOBACTAM PER 1 G: Performed by: INTERNAL MEDICINE

## 2017-01-01 PROCEDURE — 25010000002 CEFTRIAXONE PER 250 MG: Performed by: EMERGENCY MEDICINE

## 2017-01-01 PROCEDURE — 25810000003 DEXTROSE-NACL PER 500 ML: Performed by: FAMILY MEDICINE

## 2017-01-01 PROCEDURE — 80048 BASIC METABOLIC PNL TOTAL CA: CPT | Performed by: FAMILY MEDICINE

## 2017-01-01 PROCEDURE — 84484 ASSAY OF TROPONIN QUANT: CPT | Performed by: EMERGENCY MEDICINE

## 2017-01-01 PROCEDURE — 84484 ASSAY OF TROPONIN QUANT: CPT | Performed by: PHYSICIAN ASSISTANT

## 2017-01-01 PROCEDURE — 84484 ASSAY OF TROPONIN QUANT: CPT | Performed by: FAMILY MEDICINE

## 2017-01-01 PROCEDURE — 25010000002 AZITHROMYCIN: Performed by: PHYSICIAN ASSISTANT

## 2017-01-01 PROCEDURE — 82272 OCCULT BLD FECES 1-3 TESTS: CPT | Performed by: INTERNAL MEDICINE

## 2017-01-01 PROCEDURE — 93005 ELECTROCARDIOGRAM TRACING: CPT | Performed by: FAMILY MEDICINE

## 2017-01-01 PROCEDURE — 80307 DRUG TEST PRSMV CHEM ANLYZR: CPT | Performed by: EMERGENCY MEDICINE

## 2017-01-01 PROCEDURE — 25010000002 ENOXAPARIN PER 10 MG: Performed by: FAMILY MEDICINE

## 2017-01-01 PROCEDURE — 71010 HC CHEST PA OR AP: CPT

## 2017-01-01 PROCEDURE — 82805 BLOOD GASES W/O2 SATURATION: CPT

## 2017-01-01 PROCEDURE — 25010000002 CEFTRIAXONE: Performed by: PHYSICIAN ASSISTANT

## 2017-01-01 PROCEDURE — 99233 SBSQ HOSP IP/OBS HIGH 50: CPT | Performed by: FAMILY MEDICINE

## 2017-01-01 PROCEDURE — 82728 ASSAY OF FERRITIN: CPT | Performed by: FAMILY MEDICINE

## 2017-01-01 PROCEDURE — 99305 1ST NF CARE MODERATE MDM 35: CPT | Performed by: INTERNAL MEDICINE

## 2017-01-01 PROCEDURE — 80202 ASSAY OF VANCOMYCIN: CPT | Performed by: FAMILY MEDICINE

## 2017-01-01 PROCEDURE — 80069 RENAL FUNCTION PANEL: CPT | Performed by: INTERNAL MEDICINE

## 2017-01-01 PROCEDURE — 83550 IRON BINDING TEST: CPT | Performed by: FAMILY MEDICINE

## 2017-01-01 PROCEDURE — 87324 CLOSTRIDIUM AG IA: CPT | Performed by: INTERNAL MEDICINE

## 2017-01-01 PROCEDURE — 97162 PT EVAL MOD COMPLEX 30 MIN: CPT

## 2017-01-01 PROCEDURE — 85025 COMPLETE CBC W/AUTO DIFF WBC: CPT | Performed by: INTERNAL MEDICINE

## 2017-01-01 PROCEDURE — 72131 CT LUMBAR SPINE W/O DYE: CPT

## 2017-01-01 PROCEDURE — 25010000002 VANCOMYCIN PER 500 MG: Performed by: INTERNAL MEDICINE

## 2017-01-01 PROCEDURE — 25010000002 IRON SUCROSE PER 1 MG: Performed by: INTERNAL MEDICINE

## 2017-01-01 PROCEDURE — 87205 SMEAR GRAM STAIN: CPT | Performed by: EMERGENCY MEDICINE

## 2017-01-01 PROCEDURE — 85730 THROMBOPLASTIN TIME PARTIAL: CPT | Performed by: EMERGENCY MEDICINE

## 2017-01-01 PROCEDURE — 5A1935Z RESPIRATORY VENTILATION, LESS THAN 24 CONSECUTIVE HOURS: ICD-10-PCS | Performed by: FAMILY MEDICINE

## 2017-01-01 PROCEDURE — 99291 CRITICAL CARE FIRST HOUR: CPT | Performed by: INTERNAL MEDICINE

## 2017-01-01 PROCEDURE — 94002 VENT MGMT INPAT INIT DAY: CPT

## 2017-01-01 PROCEDURE — 94003 VENT MGMT INPAT SUBQ DAY: CPT

## 2017-01-01 PROCEDURE — 87040 BLOOD CULTURE FOR BACTERIA: CPT | Performed by: EMERGENCY MEDICINE

## 2017-01-01 PROCEDURE — 96361 HYDRATE IV INFUSION ADD-ON: CPT

## 2017-01-01 PROCEDURE — 94770: CPT

## 2017-01-01 PROCEDURE — 87040 BLOOD CULTURE FOR BACTERIA: CPT | Performed by: STUDENT IN AN ORGANIZED HEALTH CARE EDUCATION/TRAINING PROGRAM

## 2017-01-01 PROCEDURE — 83050 HGB METHEMOGLOBIN QUAN: CPT

## 2017-01-01 PROCEDURE — 87804 INFLUENZA ASSAY W/OPTIC: CPT | Performed by: STUDENT IN AN ORGANIZED HEALTH CARE EDUCATION/TRAINING PROGRAM

## 2017-01-01 PROCEDURE — 70450 CT HEAD/BRAIN W/O DYE: CPT

## 2017-01-01 PROCEDURE — 72125 CT NECK SPINE W/O DYE: CPT

## 2017-01-01 PROCEDURE — 94640 AIRWAY INHALATION TREATMENT: CPT

## 2017-01-01 PROCEDURE — 93005 ELECTROCARDIOGRAM TRACING: CPT | Performed by: EMERGENCY MEDICINE

## 2017-01-01 PROCEDURE — 83880 ASSAY OF NATRIURETIC PEPTIDE: CPT | Performed by: EMERGENCY MEDICINE

## 2017-01-01 PROCEDURE — 85007 BL SMEAR W/DIFF WBC COUNT: CPT | Performed by: INTERNAL MEDICINE

## 2017-01-01 PROCEDURE — 99232 SBSQ HOSP IP/OBS MODERATE 35: CPT | Performed by: INTERNAL MEDICINE

## 2017-01-01 PROCEDURE — 99285 EMERGENCY DEPT VISIT HI MDM: CPT

## 2017-01-01 PROCEDURE — 87081 CULTURE SCREEN ONLY: CPT | Performed by: FAMILY MEDICINE

## 2017-01-01 PROCEDURE — 96365 THER/PROPH/DIAG IV INF INIT: CPT

## 2017-01-01 PROCEDURE — 85025 COMPLETE CBC W/AUTO DIFF WBC: CPT | Performed by: EMERGENCY MEDICINE

## 2017-01-01 PROCEDURE — 80053 COMPREHEN METABOLIC PANEL: CPT | Performed by: EMERGENCY MEDICINE

## 2017-01-01 PROCEDURE — 87147 CULTURE TYPE IMMUNOLOGIC: CPT | Performed by: STUDENT IN AN ORGANIZED HEALTH CARE EDUCATION/TRAINING PROGRAM

## 2017-01-01 PROCEDURE — 25810000003 DEXTROSE-NACL PER 500 ML: Performed by: INTERNAL MEDICINE

## 2017-01-01 PROCEDURE — 96367 TX/PROPH/DG ADDL SEQ IV INF: CPT

## 2017-01-01 PROCEDURE — 99291 CRITICAL CARE FIRST HOUR: CPT

## 2017-01-01 PROCEDURE — 99239 HOSP IP/OBS DSCHRG MGMT >30: CPT | Performed by: FAMILY MEDICINE

## 2017-01-01 PROCEDURE — 82607 VITAMIN B-12: CPT | Performed by: INTERNAL MEDICINE

## 2017-01-01 PROCEDURE — 82746 ASSAY OF FOLIC ACID SERUM: CPT | Performed by: INTERNAL MEDICINE

## 2017-01-01 PROCEDURE — 87077 CULTURE AEROBIC IDENTIFY: CPT | Performed by: STUDENT IN AN ORGANIZED HEALTH CARE EDUCATION/TRAINING PROGRAM

## 2017-01-01 PROCEDURE — 92950 HEART/LUNG RESUSCITATION CPR: CPT

## 2017-01-01 PROCEDURE — 82550 ASSAY OF CK (CPK): CPT | Performed by: EMERGENCY MEDICINE

## 2017-01-01 PROCEDURE — 87186 SC STD MICRODIL/AGAR DIL: CPT | Performed by: STUDENT IN AN ORGANIZED HEALTH CARE EDUCATION/TRAINING PROGRAM

## 2017-01-01 PROCEDURE — 99223 1ST HOSP IP/OBS HIGH 75: CPT | Performed by: INTERNAL MEDICINE

## 2017-01-01 PROCEDURE — 84443 ASSAY THYROID STIM HORMONE: CPT | Performed by: EMERGENCY MEDICINE

## 2017-01-01 PROCEDURE — 82746 ASSAY OF FOLIC ACID SERUM: CPT | Performed by: FAMILY MEDICINE

## 2017-01-01 PROCEDURE — 71250 CT THORAX DX C-: CPT

## 2017-01-01 PROCEDURE — 93306 TTE W/DOPPLER COMPLETE: CPT

## 2017-01-01 PROCEDURE — 83605 ASSAY OF LACTIC ACID: CPT | Performed by: FAMILY MEDICINE

## 2017-01-01 PROCEDURE — 82550 ASSAY OF CK (CPK): CPT | Performed by: FAMILY MEDICINE

## 2017-01-01 PROCEDURE — 81001 URINALYSIS AUTO W/SCOPE: CPT | Performed by: EMERGENCY MEDICINE

## 2017-01-01 PROCEDURE — 99284 EMERGENCY DEPT VISIT MOD MDM: CPT

## 2017-01-01 PROCEDURE — 82024 ASSAY OF ACTH: CPT | Performed by: INTERNAL MEDICINE

## 2017-01-01 PROCEDURE — 83605 ASSAY OF LACTIC ACID: CPT | Performed by: STUDENT IN AN ORGANIZED HEALTH CARE EDUCATION/TRAINING PROGRAM

## 2017-01-01 PROCEDURE — 80053 COMPREHEN METABOLIC PANEL: CPT | Performed by: STUDENT IN AN ORGANIZED HEALTH CARE EDUCATION/TRAINING PROGRAM

## 2017-01-01 PROCEDURE — 83690 ASSAY OF LIPASE: CPT | Performed by: EMERGENCY MEDICINE

## 2017-01-01 PROCEDURE — 82140 ASSAY OF AMMONIA: CPT | Performed by: FAMILY MEDICINE

## 2017-01-01 PROCEDURE — 85025 COMPLETE CBC W/AUTO DIFF WBC: CPT | Performed by: STUDENT IN AN ORGANIZED HEALTH CARE EDUCATION/TRAINING PROGRAM

## 2017-01-01 PROCEDURE — 97165 OT EVAL LOW COMPLEX 30 MIN: CPT

## 2017-01-01 PROCEDURE — 85025 COMPLETE CBC W/AUTO DIFF WBC: CPT | Performed by: FAMILY MEDICINE

## 2017-01-01 PROCEDURE — 87186 SC STD MICRODIL/AGAR DIL: CPT | Performed by: EMERGENCY MEDICINE

## 2017-01-01 PROCEDURE — 99223 1ST HOSP IP/OBS HIGH 75: CPT | Performed by: FAMILY MEDICINE

## 2017-01-01 PROCEDURE — 87077 CULTURE AEROBIC IDENTIFY: CPT | Performed by: EMERGENCY MEDICINE

## 2017-01-01 PROCEDURE — 87070 CULTURE OTHR SPECIMN AEROBIC: CPT | Performed by: EMERGENCY MEDICINE

## 2017-01-01 PROCEDURE — 82533 TOTAL CORTISOL: CPT | Performed by: INTERNAL MEDICINE

## 2017-01-01 PROCEDURE — 25010000002 VANCOMYCIN PER 500 MG: Performed by: FAMILY MEDICINE

## 2017-01-01 PROCEDURE — 82607 VITAMIN B-12: CPT | Performed by: FAMILY MEDICINE

## 2017-01-01 PROCEDURE — 85610 PROTHROMBIN TIME: CPT | Performed by: EMERGENCY MEDICINE

## 2017-01-01 PROCEDURE — 85007 BL SMEAR W/DIFF WBC COUNT: CPT | Performed by: EMERGENCY MEDICINE

## 2017-01-01 PROCEDURE — 25010000002 CEFTRIAXONE PER 250 MG: Performed by: FAMILY MEDICINE

## 2017-01-01 PROCEDURE — 83540 ASSAY OF IRON: CPT | Performed by: FAMILY MEDICINE

## 2017-01-01 PROCEDURE — 92610 EVALUATE SWALLOWING FUNCTION: CPT

## 2017-01-01 PROCEDURE — 97530 THERAPEUTIC ACTIVITIES: CPT

## 2017-01-01 RX ORDER — KETOROLAC TROMETHAMINE 30 MG/ML
15 INJECTION, SOLUTION INTRAMUSCULAR; INTRAVENOUS ONCE
Status: COMPLETED | OUTPATIENT
Start: 2017-01-01 | End: 2017-01-01

## 2017-01-01 RX ORDER — PANTOPRAZOLE SODIUM 40 MG/1
40 TABLET, DELAYED RELEASE ORAL
Status: DISCONTINUED | OUTPATIENT
Start: 2017-01-01 | End: 2017-01-01

## 2017-01-01 RX ORDER — ASPIRIN 81 MG/1
81 TABLET ORAL DAILY
Start: 2017-01-01

## 2017-01-01 RX ORDER — FAMOTIDINE 20 MG/1
20 TABLET, FILM COATED ORAL 2 TIMES DAILY
Status: DISCONTINUED | OUTPATIENT
Start: 2017-01-01 | End: 2017-01-01 | Stop reason: HOSPADM

## 2017-01-01 RX ORDER — CLOPIDOGREL BISULFATE 75 MG/1
75 TABLET ORAL DAILY
Qty: 30 TABLET
Start: 2017-01-01

## 2017-01-01 RX ORDER — SODIUM CHLORIDE 0.9 % (FLUSH) 0.9 %
10 SYRINGE (ML) INJECTION AS NEEDED
Status: DISCONTINUED | OUTPATIENT
Start: 2017-01-01 | End: 2017-01-01 | Stop reason: HOSPADM

## 2017-01-01 RX ORDER — MORPHINE SULFATE 2 MG/ML
1 INJECTION, SOLUTION INTRAMUSCULAR; INTRAVENOUS EVERY 4 HOURS PRN
Status: DISCONTINUED | OUTPATIENT
Start: 2017-01-01 | End: 2017-01-01

## 2017-01-01 RX ORDER — IPRATROPIUM BROMIDE AND ALBUTEROL SULFATE 2.5; .5 MG/3ML; MG/3ML
3 SOLUTION RESPIRATORY (INHALATION) ONCE
Status: COMPLETED | OUTPATIENT
Start: 2017-01-01 | End: 2017-01-01

## 2017-01-01 RX ORDER — ACETAMINOPHEN 325 MG/1
650 TABLET ORAL EVERY 4 HOURS PRN
Status: DISCONTINUED | OUTPATIENT
Start: 2017-01-01 | End: 2017-01-01 | Stop reason: HOSPADM

## 2017-01-01 RX ORDER — GUAIFENESIN 600 MG/1
600 TABLET, EXTENDED RELEASE ORAL EVERY 12 HOURS SCHEDULED
Status: DISCONTINUED | OUTPATIENT
Start: 2017-01-01 | End: 2017-01-01 | Stop reason: HOSPADM

## 2017-01-01 RX ORDER — SODIUM CHLORIDE 0.9 % (FLUSH) 0.9 %
1-10 SYRINGE (ML) INJECTION AS NEEDED
Status: DISCONTINUED | OUTPATIENT
Start: 2017-01-01 | End: 2017-01-01 | Stop reason: HOSPADM

## 2017-01-01 RX ORDER — SODIUM CHLORIDE, SODIUM LACTATE, POTASSIUM CHLORIDE, CALCIUM CHLORIDE 600; 310; 30; 20 MG/100ML; MG/100ML; MG/100ML; MG/100ML
1000 INJECTION, SOLUTION INTRAVENOUS ONCE
Status: COMPLETED | OUTPATIENT
Start: 2017-01-01 | End: 2017-01-01

## 2017-01-01 RX ORDER — IPRATROPIUM BROMIDE AND ALBUTEROL SULFATE 2.5; .5 MG/3ML; MG/3ML
3 SOLUTION RESPIRATORY (INHALATION) EVERY 4 HOURS PRN
Qty: 360 ML
Start: 2017-01-01

## 2017-01-01 RX ORDER — MULTIPLE VITAMINS W/ MINERALS TAB 9MG-400MCG
1 TAB ORAL DAILY
Start: 2017-01-01

## 2017-01-01 RX ORDER — DEXTROSE MONOHYDRATE 50 MG/ML
75 INJECTION, SOLUTION INTRAVENOUS CONTINUOUS
Status: DISCONTINUED | OUTPATIENT
Start: 2017-01-01 | End: 2017-01-01

## 2017-01-01 RX ORDER — POTASSIUM CHLORIDE 1.5 G/1.77G
40 POWDER, FOR SOLUTION ORAL ONCE
Status: COMPLETED | OUTPATIENT
Start: 2017-01-01 | End: 2017-01-01

## 2017-01-01 RX ORDER — NALOXONE HCL 0.4 MG/ML
0.4 VIAL (ML) INJECTION
Status: DISCONTINUED | OUTPATIENT
Start: 2017-01-01 | End: 2017-01-01

## 2017-01-01 RX ORDER — POTASSIUM CHLORIDE 1.5 G/1.77G
40 POWDER, FOR SOLUTION ORAL DAILY
Start: 2017-01-01

## 2017-01-01 RX ORDER — BISOPROLOL FUMARATE 5 MG/1
2.5 TABLET, FILM COATED ORAL
Start: 2017-01-01

## 2017-01-01 RX ORDER — BISOPROLOL FUMARATE 5 MG/1
2.5 TABLET, FILM COATED ORAL
Status: DISCONTINUED | OUTPATIENT
Start: 2017-01-01 | End: 2017-01-01 | Stop reason: HOSPADM

## 2017-01-01 RX ORDER — DEXTROSE AND SODIUM CHLORIDE 5; .9 G/100ML; G/100ML
75 INJECTION, SOLUTION INTRAVENOUS CONTINUOUS
Status: DISCONTINUED | OUTPATIENT
Start: 2017-01-01 | End: 2017-01-01

## 2017-01-01 RX ORDER — CLOPIDOGREL BISULFATE 75 MG/1
75 TABLET ORAL DAILY
Status: DISCONTINUED | OUTPATIENT
Start: 2017-01-01 | End: 2017-01-01 | Stop reason: HOSPADM

## 2017-01-01 RX ORDER — ASPIRIN 325 MG
325 TABLET ORAL ONCE
Status: COMPLETED | OUTPATIENT
Start: 2017-01-01 | End: 2017-01-01

## 2017-01-01 RX ORDER — MORPHINE SULFATE 2 MG/ML
4 INJECTION, SOLUTION INTRAMUSCULAR; INTRAVENOUS EVERY 4 HOURS PRN
Status: DISCONTINUED | OUTPATIENT
Start: 2017-01-01 | End: 2017-01-01 | Stop reason: HOSPADM

## 2017-01-01 RX ORDER — AMOXICILLIN AND CLAVULANATE POTASSIUM 875; 125 MG/1; MG/1
1 TABLET, FILM COATED ORAL EVERY 12 HOURS SCHEDULED
Status: DISCONTINUED | OUTPATIENT
Start: 2017-01-01 | End: 2017-01-01 | Stop reason: HOSPADM

## 2017-01-01 RX ORDER — BISACODYL 10 MG
10 SUPPOSITORY, RECTAL RECTAL DAILY PRN
Status: DISCONTINUED | OUTPATIENT
Start: 2017-01-01 | End: 2017-01-01 | Stop reason: HOSPADM

## 2017-01-01 RX ORDER — LORAZEPAM 2 MG/ML
2 INJECTION INTRAMUSCULAR EVERY 4 HOURS PRN
Status: DISCONTINUED | OUTPATIENT
Start: 2017-01-01 | End: 2017-01-01 | Stop reason: HOSPADM

## 2017-01-01 RX ORDER — ONDANSETRON 4 MG/1
4 TABLET, FILM COATED ORAL EVERY 6 HOURS PRN
Start: 2017-01-01

## 2017-01-01 RX ORDER — ACETYLCYSTEINE 200 MG/ML
4 SOLUTION ORAL; RESPIRATORY (INHALATION)
Status: DISCONTINUED | OUTPATIENT
Start: 2017-01-01 | End: 2017-01-01

## 2017-01-01 RX ORDER — LEVOFLOXACIN 500 MG/1
500 TABLET, FILM COATED ORAL DAILY
Qty: 7 TABLET | Refills: 0 | Status: SHIPPED | OUTPATIENT
Start: 2017-01-01 | End: 2017-01-01 | Stop reason: HOSPADM

## 2017-01-01 RX ORDER — POTASSIUM CHLORIDE 1.5 G/1.77G
40 POWDER, FOR SOLUTION ORAL DAILY
Status: DISCONTINUED | OUTPATIENT
Start: 2017-01-01 | End: 2017-01-01 | Stop reason: HOSPADM

## 2017-01-01 RX ORDER — NALOXONE HCL 0.4 MG/ML
0.4 VIAL (ML) INJECTION
Status: DISCONTINUED | OUTPATIENT
Start: 2017-01-01 | End: 2017-01-01 | Stop reason: HOSPADM

## 2017-01-01 RX ORDER — CARVEDILOL 6.25 MG/1
3.12 TABLET ORAL EVERY 12 HOURS SCHEDULED
Status: DISCONTINUED | OUTPATIENT
Start: 2017-01-01 | End: 2017-01-01

## 2017-01-01 RX ORDER — ACETAMINOPHEN 325 MG/1
650 TABLET ORAL EVERY 4 HOURS PRN
Start: 2017-01-01

## 2017-01-01 RX ORDER — ONDANSETRON 4 MG/1
4 TABLET, FILM COATED ORAL EVERY 6 HOURS PRN
Status: DISCONTINUED | OUTPATIENT
Start: 2017-01-01 | End: 2017-01-01 | Stop reason: HOSPADM

## 2017-01-01 RX ORDER — FAMOTIDINE 20 MG/1
20 TABLET, FILM COATED ORAL 2 TIMES DAILY
Status: DISCONTINUED | OUTPATIENT
Start: 2017-01-01 | End: 2017-01-01

## 2017-01-01 RX ORDER — FAMOTIDINE 20 MG/1
20 TABLET, FILM COATED ORAL 2 TIMES DAILY
Start: 2017-01-01

## 2017-01-01 RX ORDER — FLUDROCORTISONE ACETATE 0.1 MG/1
0.1 TABLET ORAL DAILY
Start: 2017-01-01

## 2017-01-01 RX ORDER — ALBUTEROL SULFATE 90 UG/1
2 AEROSOL, METERED RESPIRATORY (INHALATION) EVERY 4 HOURS PRN
Qty: 1 INHALER | Refills: 0 | Status: SHIPPED | OUTPATIENT
Start: 2017-01-01 | End: 2017-01-01 | Stop reason: HOSPADM

## 2017-01-01 RX ORDER — LISINOPRIL 5 MG/1
2.5 TABLET ORAL
Status: DISCONTINUED | OUTPATIENT
Start: 2017-01-01 | End: 2017-01-01

## 2017-01-01 RX ORDER — SODIUM CHLORIDE 9 MG/ML
100 INJECTION, SOLUTION INTRAVENOUS CONTINUOUS
Status: DISCONTINUED | OUTPATIENT
Start: 2017-01-01 | End: 2017-01-01 | Stop reason: HOSPADM

## 2017-01-01 RX ORDER — ONDANSETRON 2 MG/ML
4 INJECTION INTRAMUSCULAR; INTRAVENOUS EVERY 6 HOURS PRN
Status: DISCONTINUED | OUTPATIENT
Start: 2017-01-01 | End: 2017-01-01 | Stop reason: HOSPADM

## 2017-01-01 RX ORDER — AMOXICILLIN AND CLAVULANATE POTASSIUM 875; 125 MG/1; MG/1
1 TABLET, FILM COATED ORAL EVERY 12 HOURS SCHEDULED
Qty: 20 TABLET | Refills: 0
Start: 2017-01-01 | End: 2017-10-27

## 2017-01-01 RX ORDER — FLUDROCORTISONE ACETATE 0.1 MG/1
100 TABLET ORAL DAILY
Status: DISCONTINUED | OUTPATIENT
Start: 2017-01-01 | End: 2017-01-01 | Stop reason: HOSPADM

## 2017-01-01 RX ORDER — ASPIRIN 81 MG/1
81 TABLET ORAL DAILY
Status: DISCONTINUED | OUTPATIENT
Start: 2017-01-01 | End: 2017-01-01 | Stop reason: HOSPADM

## 2017-01-01 RX ORDER — IPRATROPIUM BROMIDE AND ALBUTEROL SULFATE 2.5; .5 MG/3ML; MG/3ML
3 SOLUTION RESPIRATORY (INHALATION)
Status: DISCONTINUED | OUTPATIENT
Start: 2017-01-01 | End: 2017-01-01 | Stop reason: HOSPADM

## 2017-01-01 RX ORDER — IPRATROPIUM BROMIDE AND ALBUTEROL SULFATE 2.5; .5 MG/3ML; MG/3ML
3 SOLUTION RESPIRATORY (INHALATION) EVERY 4 HOURS PRN
Status: DISCONTINUED | OUTPATIENT
Start: 2017-01-01 | End: 2017-01-01 | Stop reason: HOSPADM

## 2017-01-01 RX ORDER — MIDODRINE HYDROCHLORIDE 5 MG/1
5 TABLET ORAL
Status: DISCONTINUED | OUTPATIENT
Start: 2017-01-01 | End: 2017-01-01

## 2017-01-01 RX ORDER — ONDANSETRON 4 MG/1
4 TABLET, ORALLY DISINTEGRATING ORAL EVERY 6 HOURS PRN
Status: DISCONTINUED | OUTPATIENT
Start: 2017-01-01 | End: 2017-01-01 | Stop reason: HOSPADM

## 2017-01-01 RX ORDER — ATORVASTATIN CALCIUM 40 MG/1
40 TABLET, FILM COATED ORAL NIGHTLY
Status: DISCONTINUED | OUTPATIENT
Start: 2017-01-01 | End: 2017-01-01 | Stop reason: HOSPADM

## 2017-01-01 RX ORDER — BENZONATATE 100 MG/1
200 CAPSULE ORAL 3 TIMES DAILY PRN
Status: DISCONTINUED | OUTPATIENT
Start: 2017-01-01 | End: 2017-01-01 | Stop reason: HOSPADM

## 2017-01-01 RX ORDER — MULTIPLE VITAMINS W/ MINERALS TAB 9MG-400MCG
1 TAB ORAL DAILY
Status: DISCONTINUED | OUTPATIENT
Start: 2017-01-01 | End: 2017-01-01 | Stop reason: HOSPADM

## 2017-01-01 RX ORDER — ATORVASTATIN CALCIUM 40 MG/1
40 TABLET, FILM COATED ORAL NIGHTLY
Start: 2017-01-01

## 2017-01-01 RX ADMIN — GUAIFENESIN 600 MG: 600 TABLET, EXTENDED RELEASE ORAL at 08:14

## 2017-01-01 RX ADMIN — IPRATROPIUM BROMIDE AND ALBUTEROL SULFATE 3 ML: .5; 3 SOLUTION RESPIRATORY (INHALATION) at 06:42

## 2017-01-01 RX ADMIN — DIBASIC SODIUM PHOSPHATE, MONOBASIC POTASSIUM PHOSPHATE AND MONOBASIC SODIUM PHOSPHATE 1 TABLET: 852; 155; 130 TABLET ORAL at 08:39

## 2017-01-01 RX ADMIN — FAMOTIDINE 20 MG: 20 TABLET, FILM COATED ORAL at 11:44

## 2017-01-01 RX ADMIN — SODIUM CHLORIDE 250 ML: 9 INJECTION, SOLUTION INTRAVENOUS at 14:00

## 2017-01-01 RX ADMIN — PANTOPRAZOLE SODIUM 40 MG: 40 TABLET, DELAYED RELEASE ORAL at 06:38

## 2017-01-01 RX ADMIN — BISOPROLOL FUMARATE 2.5 MG: 5 TABLET, COATED ORAL at 09:27

## 2017-01-01 RX ADMIN — MIDODRINE HYDROCHLORIDE 5 MG: 5 TABLET ORAL at 06:48

## 2017-01-01 RX ADMIN — GUAIFENESIN 600 MG: 600 TABLET, EXTENDED RELEASE ORAL at 20:46

## 2017-01-01 RX ADMIN — IPRATROPIUM BROMIDE AND ALBUTEROL SULFATE 3 ML: .5; 3 SOLUTION RESPIRATORY (INHALATION) at 06:55

## 2017-01-01 RX ADMIN — SODIUM CHLORIDE 1000 ML: 9 INJECTION, SOLUTION INTRAVENOUS at 06:46

## 2017-01-01 RX ADMIN — IPRATROPIUM BROMIDE AND ALBUTEROL SULFATE 3 ML: .5; 3 SOLUTION RESPIRATORY (INHALATION) at 13:06

## 2017-01-01 RX ADMIN — ACETYLCYSTEINE 4 ML: 200 SOLUTION ORAL; RESPIRATORY (INHALATION) at 06:41

## 2017-01-01 RX ADMIN — ENOXAPARIN SODIUM 40 MG: 40 INJECTION SUBCUTANEOUS at 08:15

## 2017-01-01 RX ADMIN — ACETYLCYSTEINE 4 ML: 200 SOLUTION ORAL; RESPIRATORY (INHALATION) at 12:42

## 2017-01-01 RX ADMIN — AMOXICILLIN AND CLAVULANATE POTASSIUM 1 TABLET: 875; 125 TABLET, FILM COATED ORAL at 11:44

## 2017-01-01 RX ADMIN — DIBASIC SODIUM PHOSPHATE, MONOBASIC POTASSIUM PHOSPHATE AND MONOBASIC SODIUM PHOSPHATE 1 TABLET: 852; 155; 130 TABLET ORAL at 20:00

## 2017-01-01 RX ADMIN — ATORVASTATIN CALCIUM 40 MG: 40 TABLET, FILM COATED ORAL at 20:15

## 2017-01-01 RX ADMIN — IPRATROPIUM BROMIDE AND ALBUTEROL SULFATE 3 ML: .5; 3 SOLUTION RESPIRATORY (INHALATION) at 18:54

## 2017-01-01 RX ADMIN — ASPIRIN 81 MG: 81 TABLET, COATED ORAL at 08:14

## 2017-01-01 RX ADMIN — GUAIFENESIN 600 MG: 600 TABLET, EXTENDED RELEASE ORAL at 20:00

## 2017-01-01 RX ADMIN — NYSTATIN 500000 UNITS: 100000 SUSPENSION ORAL at 17:52

## 2017-01-01 RX ADMIN — MIDODRINE HYDROCHLORIDE 5 MG: 5 TABLET ORAL at 12:46

## 2017-01-01 RX ADMIN — DIBASIC SODIUM PHOSPHATE, MONOBASIC POTASSIUM PHOSPHATE AND MONOBASIC SODIUM PHOSPHATE 1 TABLET: 852; 155; 130 TABLET ORAL at 21:20

## 2017-01-01 RX ADMIN — DEXTROSE AND SODIUM CHLORIDE 100 ML/HR: 5; 900 INJECTION, SOLUTION INTRAVENOUS at 03:58

## 2017-01-01 RX ADMIN — ACETYLCYSTEINE 4 ML: 200 SOLUTION ORAL; RESPIRATORY (INHALATION) at 12:57

## 2017-01-01 RX ADMIN — DIBASIC SODIUM PHOSPHATE, MONOBASIC POTASSIUM PHOSPHATE AND MONOBASIC SODIUM PHOSPHATE 1 TABLET: 852; 155; 130 TABLET ORAL at 09:14

## 2017-01-01 RX ADMIN — DEXTROSE MONOHYDRATE 75 ML/HR: 50 INJECTION, SOLUTION INTRAVENOUS at 08:16

## 2017-01-01 RX ADMIN — NYSTATIN 500000 UNITS: 100000 SUSPENSION ORAL at 08:40

## 2017-01-01 RX ADMIN — DIBASIC SODIUM PHOSPHATE, MONOBASIC POTASSIUM PHOSPHATE AND MONOBASIC SODIUM PHOSPHATE 1 TABLET: 852; 155; 130 TABLET ORAL at 09:28

## 2017-01-01 RX ADMIN — IPRATROPIUM BROMIDE AND ALBUTEROL SULFATE 3 ML: .5; 3 SOLUTION RESPIRATORY (INHALATION) at 07:03

## 2017-01-01 RX ADMIN — NOREPINEPHRINE BITARTRATE 0.07 MCG/KG/MIN: 1 INJECTION INTRAVENOUS at 16:49

## 2017-01-01 RX ADMIN — CLOPIDOGREL BISULFATE 75 MG: 75 TABLET ORAL at 09:27

## 2017-01-01 RX ADMIN — NOREPINEPHRINE BITARTRATE 0.12 MCG/KG/MIN: 1 INJECTION INTRAVENOUS at 16:53

## 2017-01-01 RX ADMIN — GUAIFENESIN 600 MG: 600 TABLET, EXTENDED RELEASE ORAL at 21:20

## 2017-01-01 RX ADMIN — NOREPINEPHRINE BITARTRATE 0.03 MCG/KG/MIN: 1 INJECTION INTRAVENOUS at 16:52

## 2017-01-01 RX ADMIN — IPRATROPIUM BROMIDE AND ALBUTEROL SULFATE 3 ML: .5; 3 SOLUTION RESPIRATORY (INHALATION) at 18:57

## 2017-01-01 RX ADMIN — MIDODRINE HYDROCHLORIDE 5 MG: 5 TABLET ORAL at 13:03

## 2017-01-01 RX ADMIN — TAZOBACTAM SODIUM AND PIPERACILLIN SODIUM 4.5 G: 500; 4 INJECTION, SOLUTION INTRAVENOUS at 19:20

## 2017-01-01 RX ADMIN — CEFTRIAXONE 1 G: 1 INJECTION, POWDER, FOR SOLUTION INTRAMUSCULAR; INTRAVENOUS at 00:11

## 2017-01-01 RX ADMIN — GUAIFENESIN 600 MG: 600 TABLET, EXTENDED RELEASE ORAL at 08:40

## 2017-01-01 RX ADMIN — MIDODRINE HYDROCHLORIDE 5 MG: 5 TABLET ORAL at 06:38

## 2017-01-01 RX ADMIN — DIBASIC SODIUM PHOSPHATE, MONOBASIC POTASSIUM PHOSPHATE AND MONOBASIC SODIUM PHOSPHATE 1 TABLET: 852; 155; 130 TABLET ORAL at 12:54

## 2017-01-01 RX ADMIN — TAZOBACTAM SODIUM AND PIPERACILLIN SODIUM 4.5 G: 500; 4 INJECTION, SOLUTION INTRAVENOUS at 11:50

## 2017-01-01 RX ADMIN — FAMOTIDINE 20 MG: 20 TABLET, FILM COATED ORAL at 19:38

## 2017-01-01 RX ADMIN — PANTOPRAZOLE SODIUM 40 MG: 40 TABLET, DELAYED RELEASE ORAL at 07:12

## 2017-01-01 RX ADMIN — TAZOBACTAM SODIUM AND PIPERACILLIN SODIUM 4.5 G: 500; 4 INJECTION, SOLUTION INTRAVENOUS at 18:31

## 2017-01-01 RX ADMIN — KETOROLAC TROMETHAMINE 15 MG: 30 INJECTION, SOLUTION INTRAMUSCULAR at 10:47

## 2017-01-01 RX ADMIN — TAZOBACTAM SODIUM AND PIPERACILLIN SODIUM 4.5 G: 500; 4 INJECTION, SOLUTION INTRAVENOUS at 03:37

## 2017-01-01 RX ADMIN — POTASSIUM CHLORIDE 40 MEQ: 1.5 POWDER, FOR SOLUTION ORAL at 09:15

## 2017-01-01 RX ADMIN — LISINOPRIL 2.5 MG: 5 TABLET ORAL at 08:13

## 2017-01-01 RX ADMIN — TAZOBACTAM SODIUM AND PIPERACILLIN SODIUM 4.5 G: 500; 4 INJECTION, SOLUTION INTRAVENOUS at 02:50

## 2017-01-01 RX ADMIN — ACETYLCYSTEINE 4 ML: 200 SOLUTION ORAL; RESPIRATORY (INHALATION) at 07:03

## 2017-01-01 RX ADMIN — IRON SUCROSE 100 MG: 20 INJECTION, SOLUTION INTRAVENOUS at 10:35

## 2017-01-01 RX ADMIN — AZITHROMYCIN 500 MG: 500 INJECTION, POWDER, LYOPHILIZED, FOR SOLUTION INTRAVENOUS at 08:36

## 2017-01-01 RX ADMIN — POTASSIUM CHLORIDE 40 MEQ: 1.5 POWDER, FOR SOLUTION ORAL at 09:27

## 2017-01-01 RX ADMIN — ATORVASTATIN CALCIUM 40 MG: 40 TABLET, FILM COATED ORAL at 20:00

## 2017-01-01 RX ADMIN — IPRATROPIUM BROMIDE AND ALBUTEROL SULFATE 3 ML: .5; 3 SOLUTION RESPIRATORY (INHALATION) at 17:09

## 2017-01-01 RX ADMIN — MULTIPLE VITAMINS W/ MINERALS TAB 1 TABLET: TAB at 09:14

## 2017-01-01 RX ADMIN — POTASSIUM CHLORIDE 40 MEQ: 1.5 POWDER, FOR SOLUTION ORAL at 11:57

## 2017-01-01 RX ADMIN — IRON SUCROSE 100 MG: 20 INJECTION, SOLUTION INTRAVENOUS at 10:40

## 2017-01-01 RX ADMIN — VANCOMYCIN HYDROCHLORIDE 750 MG: 750 INJECTION, SOLUTION INTRAVENOUS at 03:58

## 2017-01-01 RX ADMIN — IPRATROPIUM BROMIDE AND ALBUTEROL SULFATE 3 ML: .5; 3 SOLUTION RESPIRATORY (INHALATION) at 12:57

## 2017-01-01 RX ADMIN — TAZOBACTAM SODIUM AND PIPERACILLIN SODIUM 4.5 G: 500; 4 INJECTION, SOLUTION INTRAVENOUS at 10:22

## 2017-01-01 RX ADMIN — DEXTROSE AND SODIUM CHLORIDE 75 ML/HR: 5; 900 INJECTION, SOLUTION INTRAVENOUS at 19:23

## 2017-01-01 RX ADMIN — CARVEDILOL 3.12 MG: 6.25 TABLET, FILM COATED ORAL at 08:14

## 2017-01-01 RX ADMIN — MIDODRINE HYDROCHLORIDE 5 MG: 5 TABLET ORAL at 19:24

## 2017-01-01 RX ADMIN — IPRATROPIUM BROMIDE AND ALBUTEROL SULFATE 3 ML: .5; 3 SOLUTION RESPIRATORY (INHALATION) at 12:55

## 2017-01-01 RX ADMIN — IRON SUCROSE 100 MG: 20 INJECTION, SOLUTION INTRAVENOUS at 09:29

## 2017-01-01 RX ADMIN — IPRATROPIUM BROMIDE AND ALBUTEROL SULFATE 3 ML: .5; 3 SOLUTION RESPIRATORY (INHALATION) at 23:35

## 2017-01-01 RX ADMIN — FAMOTIDINE 20 MG: 20 TABLET, FILM COATED ORAL at 08:14

## 2017-01-01 RX ADMIN — IPRATROPIUM BROMIDE AND ALBUTEROL SULFATE 3 ML: .5; 3 SOLUTION RESPIRATORY (INHALATION) at 06:34

## 2017-01-01 RX ADMIN — SODIUM CHLORIDE 75 ML/HR: 9 INJECTION, SOLUTION INTRAVENOUS at 11:52

## 2017-01-01 RX ADMIN — FAMOTIDINE 20 MG: 20 TABLET, FILM COATED ORAL at 18:13

## 2017-01-01 RX ADMIN — GUAIFENESIN 600 MG: 600 TABLET, EXTENDED RELEASE ORAL at 09:15

## 2017-01-01 RX ADMIN — ACETYLCYSTEINE 4 ML: 200 SOLUTION ORAL; RESPIRATORY (INHALATION) at 00:21

## 2017-01-01 RX ADMIN — MULTIPLE VITAMINS W/ MINERALS TAB 1 TABLET: TAB at 08:14

## 2017-01-01 RX ADMIN — IPRATROPIUM BROMIDE AND ALBUTEROL SULFATE 3 ML: .5; 3 SOLUTION RESPIRATORY (INHALATION) at 06:54

## 2017-01-01 RX ADMIN — IPRATROPIUM BROMIDE AND ALBUTEROL SULFATE 3 ML: .5; 3 SOLUTION RESPIRATORY (INHALATION) at 12:42

## 2017-01-01 RX ADMIN — ACETYLCYSTEINE 4 ML: 200 SOLUTION ORAL; RESPIRATORY (INHALATION) at 19:11

## 2017-01-01 RX ADMIN — IPRATROPIUM BROMIDE AND ALBUTEROL SULFATE 3 ML: .5; 3 SOLUTION RESPIRATORY (INHALATION) at 18:27

## 2017-01-01 RX ADMIN — PANTOPRAZOLE SODIUM 40 MG: 40 TABLET, DELAYED RELEASE ORAL at 06:15

## 2017-01-01 RX ADMIN — AZITHROMYCIN 500 MG: 500 INJECTION, POWDER, LYOPHILIZED, FOR SOLUTION INTRAVENOUS at 00:41

## 2017-01-01 RX ADMIN — TAZOBACTAM SODIUM AND PIPERACILLIN SODIUM 4.5 G: 500; 4 INJECTION, SOLUTION INTRAVENOUS at 18:13

## 2017-01-01 RX ADMIN — NYSTATIN 500000 UNITS: 100000 SUSPENSION ORAL at 11:31

## 2017-01-01 RX ADMIN — IPRATROPIUM BROMIDE AND ALBUTEROL SULFATE 3 ML: .5; 3 SOLUTION RESPIRATORY (INHALATION) at 00:40

## 2017-01-01 RX ADMIN — ENOXAPARIN SODIUM 40 MG: 40 INJECTION SUBCUTANEOUS at 08:16

## 2017-01-01 RX ADMIN — ACETYLCYSTEINE 4 ML: 200 SOLUTION ORAL; RESPIRATORY (INHALATION) at 18:15

## 2017-01-01 RX ADMIN — DIBASIC SODIUM PHOSPHATE, MONOBASIC POTASSIUM PHOSPHATE AND MONOBASIC SODIUM PHOSPHATE 1 TABLET: 852; 155; 130 TABLET ORAL at 20:15

## 2017-01-01 RX ADMIN — FAMOTIDINE 20 MG: 20 TABLET, FILM COATED ORAL at 08:36

## 2017-01-01 RX ADMIN — IPRATROPIUM BROMIDE AND ALBUTEROL SULFATE 3 ML: .5; 3 SOLUTION RESPIRATORY (INHALATION) at 00:25

## 2017-01-01 RX ADMIN — NYSTATIN 500000 UNITS: 100000 SUSPENSION ORAL at 20:00

## 2017-01-01 RX ADMIN — ATORVASTATIN CALCIUM 40 MG: 40 TABLET, FILM COATED ORAL at 21:20

## 2017-01-01 RX ADMIN — ACETYLCYSTEINE 4 ML: 200 SOLUTION ORAL; RESPIRATORY (INHALATION) at 06:54

## 2017-01-01 RX ADMIN — ACETYLCYSTEINE 4 ML: 200 SOLUTION ORAL; RESPIRATORY (INHALATION) at 00:25

## 2017-01-01 RX ADMIN — TAZOBACTAM SODIUM AND PIPERACILLIN SODIUM 4.5 G: 500; 4 INJECTION, SOLUTION INTRAVENOUS at 03:38

## 2017-01-01 RX ADMIN — MULTIPLE VITAMINS W/ MINERALS TAB 1 TABLET: TAB at 09:11

## 2017-01-01 RX ADMIN — TAZOBACTAM SODIUM AND PIPERACILLIN SODIUM 4.5 G: 500; 4 INJECTION, SOLUTION INTRAVENOUS at 10:51

## 2017-01-01 RX ADMIN — DIBASIC SODIUM PHOSPHATE, MONOBASIC POTASSIUM PHOSPHATE AND MONOBASIC SODIUM PHOSPHATE 1 TABLET: 852; 155; 130 TABLET ORAL at 18:13

## 2017-01-01 RX ADMIN — NYSTATIN 500000 UNITS: 100000 SUSPENSION ORAL at 20:46

## 2017-01-01 RX ADMIN — FAMOTIDINE 20 MG: 20 TABLET, FILM COATED ORAL at 09:11

## 2017-01-01 RX ADMIN — TAZOBACTAM SODIUM AND PIPERACILLIN SODIUM 4.5 G: 500; 4 INJECTION, SOLUTION INTRAVENOUS at 11:32

## 2017-01-01 RX ADMIN — MIDODRINE HYDROCHLORIDE 5 MG: 5 TABLET ORAL at 18:12

## 2017-01-01 RX ADMIN — NYSTATIN 500000 UNITS: 100000 SUSPENSION ORAL at 19:23

## 2017-01-01 RX ADMIN — IPRATROPIUM BROMIDE AND ALBUTEROL SULFATE 3 ML: .5; 3 SOLUTION RESPIRATORY (INHALATION) at 00:49

## 2017-01-01 RX ADMIN — ASPIRIN 325 MG ORAL TABLET 325 MG: 325 PILL ORAL at 10:44

## 2017-01-01 RX ADMIN — DIBASIC SODIUM PHOSPHATE, MONOBASIC POTASSIUM PHOSPHATE AND MONOBASIC SODIUM PHOSPHATE 1 TABLET: 852; 155; 130 TABLET ORAL at 11:31

## 2017-01-01 RX ADMIN — ACETYLCYSTEINE 4 ML: 200 SOLUTION ORAL; RESPIRATORY (INHALATION) at 18:57

## 2017-01-01 RX ADMIN — NYSTATIN 500000 UNITS: 100000 SUSPENSION ORAL at 20:15

## 2017-01-01 RX ADMIN — SODIUM CHLORIDE 1000 ML: 9 INJECTION, SOLUTION INTRAVENOUS at 17:45

## 2017-01-01 RX ADMIN — BENZONATATE 200 MG: 100 CAPSULE ORAL at 10:03

## 2017-01-01 RX ADMIN — SODIUM CHLORIDE 100 ML/HR: 9 INJECTION, SOLUTION INTRAVENOUS at 09:06

## 2017-01-01 RX ADMIN — DIBASIC SODIUM PHOSPHATE, MONOBASIC POTASSIUM PHOSPHATE AND MONOBASIC SODIUM PHOSPHATE 1 TABLET: 852; 155; 130 TABLET ORAL at 11:44

## 2017-01-01 RX ADMIN — NOREPINEPHRINE BITARTRATE 0.14 MCG/KG/MIN: 1 INJECTION INTRAVENOUS at 16:45

## 2017-01-01 RX ADMIN — GUAIFENESIN 600 MG: 600 TABLET, EXTENDED RELEASE ORAL at 20:50

## 2017-01-01 RX ADMIN — TAZOBACTAM SODIUM AND PIPERACILLIN SODIUM 4.5 G: 500; 4 INJECTION, SOLUTION INTRAVENOUS at 05:18

## 2017-01-01 RX ADMIN — GUAIFENESIN 600 MG: 600 TABLET, EXTENDED RELEASE ORAL at 08:35

## 2017-01-01 RX ADMIN — GUAIFENESIN 600 MG: 600 TABLET, EXTENDED RELEASE ORAL at 10:03

## 2017-01-01 RX ADMIN — DIBASIC SODIUM PHOSPHATE, MONOBASIC POTASSIUM PHOSPHATE AND MONOBASIC SODIUM PHOSPHATE 1 TABLET: 852; 155; 130 TABLET ORAL at 19:24

## 2017-01-01 RX ADMIN — SODIUM CHLORIDE 500 ML: 9 INJECTION, SOLUTION INTRAVENOUS at 07:55

## 2017-01-01 RX ADMIN — ASPIRIN 81 MG: 81 TABLET, COATED ORAL at 09:27

## 2017-01-01 RX ADMIN — ACETAMINOPHEN 650 MG: 325 TABLET, FILM COATED ORAL at 08:14

## 2017-01-01 RX ADMIN — NYSTATIN 500000 UNITS: 100000 SUSPENSION ORAL at 18:12

## 2017-01-01 RX ADMIN — SODIUM CHLORIDE 1000 ML: 9 INJECTION, SOLUTION INTRAVENOUS at 09:11

## 2017-01-01 RX ADMIN — LISINOPRIL 2.5 MG: 5 TABLET ORAL at 15:10

## 2017-01-01 RX ADMIN — IPRATROPIUM BROMIDE AND ALBUTEROL SULFATE 3 ML: .5; 3 SOLUTION RESPIRATORY (INHALATION) at 12:45

## 2017-01-01 RX ADMIN — SODIUM CHLORIDE 2000 ML: 9 INJECTION, SOLUTION INTRAVENOUS at 22:41

## 2017-01-01 RX ADMIN — TAZOBACTAM SODIUM AND PIPERACILLIN SODIUM 4.5 G: 500; 4 INJECTION, SOLUTION INTRAVENOUS at 03:42

## 2017-01-01 RX ADMIN — DEXTROSE MONOHYDRATE 75 ML/HR: 50 INJECTION, SOLUTION INTRAVENOUS at 09:34

## 2017-01-01 RX ADMIN — NYSTATIN 500000 UNITS: 100000 SUSPENSION ORAL at 12:54

## 2017-01-01 RX ADMIN — NYSTATIN 500000 UNITS: 100000 SUSPENSION ORAL at 09:28

## 2017-01-01 RX ADMIN — TAZOBACTAM SODIUM AND PIPERACILLIN SODIUM 4.5 G: 500; 4 INJECTION, SOLUTION INTRAVENOUS at 19:45

## 2017-01-01 RX ADMIN — POTASSIUM CHLORIDE 40 MEQ: 1.5 POWDER, FOR SOLUTION ORAL at 14:39

## 2017-01-01 RX ADMIN — NOREPINEPHRINE BITARTRATE 0.02 MCG/KG/MIN: 1 INJECTION INTRAVENOUS at 09:09

## 2017-01-01 RX ADMIN — TAZOBACTAM SODIUM AND PIPERACILLIN SODIUM 4.5 G: 500; 4 INJECTION, SOLUTION INTRAVENOUS at 19:28

## 2017-01-01 RX ADMIN — TAZOBACTAM SODIUM AND PIPERACILLIN SODIUM 4.5 G: 500; 4 INJECTION, SOLUTION INTRAVENOUS at 10:40

## 2017-01-01 RX ADMIN — IPRATROPIUM BROMIDE AND ALBUTEROL SULFATE 3 ML: .5; 3 SOLUTION RESPIRATORY (INHALATION) at 00:00

## 2017-01-01 RX ADMIN — NOREPINEPHRINE BITARTRATE 0.14 MCG/KG/MIN: 1 INJECTION INTRAVENOUS at 19:18

## 2017-01-01 RX ADMIN — NYSTATIN 500000 UNITS: 100000 SUSPENSION ORAL at 11:33

## 2017-01-01 RX ADMIN — IPRATROPIUM BROMIDE AND ALBUTEROL SULFATE 3 ML: .5; 3 SOLUTION RESPIRATORY (INHALATION) at 00:51

## 2017-01-01 RX ADMIN — PANTOPRAZOLE SODIUM 40 MG: 40 TABLET, DELAYED RELEASE ORAL at 06:48

## 2017-01-01 RX ADMIN — CARVEDILOL 3.12 MG: 6.25 TABLET, FILM COATED ORAL at 15:10

## 2017-01-01 RX ADMIN — NOREPINEPHRINE BITARTRATE 0.28 MCG/KG/MIN: 1 INJECTION INTRAVENOUS at 14:06

## 2017-01-01 RX ADMIN — GUAIFENESIN 600 MG: 600 TABLET, EXTENDED RELEASE ORAL at 20:15

## 2017-01-01 RX ADMIN — DIBASIC SODIUM PHOSPHATE, MONOBASIC POTASSIUM PHOSPHATE AND MONOBASIC SODIUM PHOSPHATE 1 TABLET: 852; 155; 130 TABLET ORAL at 18:47

## 2017-01-01 RX ADMIN — ACETYLCYSTEINE 4 ML: 200 SOLUTION ORAL; RESPIRATORY (INHALATION) at 00:49

## 2017-01-01 RX ADMIN — ACETYLCYSTEINE 4 ML: 200 SOLUTION ORAL; RESPIRATORY (INHALATION) at 12:55

## 2017-01-01 RX ADMIN — AZITHROMYCIN 500 MG: 500 INJECTION, POWDER, LYOPHILIZED, FOR SOLUTION INTRAVENOUS at 08:40

## 2017-01-01 RX ADMIN — NOREPINEPHRINE BITARTRATE 0.28 MCG/KG/MIN: 1 INJECTION INTRAVENOUS at 02:11

## 2017-01-01 RX ADMIN — MIDODRINE HYDROCHLORIDE 5 MG: 5 TABLET ORAL at 17:30

## 2017-01-01 RX ADMIN — ENOXAPARIN SODIUM 40 MG: 40 INJECTION SUBCUTANEOUS at 08:35

## 2017-01-01 RX ADMIN — ATORVASTATIN CALCIUM 40 MG: 40 TABLET, FILM COATED ORAL at 20:50

## 2017-01-01 RX ADMIN — IPRATROPIUM BROMIDE AND ALBUTEROL SULFATE 3 ML: .5; 3 SOLUTION RESPIRATORY (INHALATION) at 00:21

## 2017-01-01 RX ADMIN — TAZOBACTAM SODIUM AND PIPERACILLIN SODIUM 4.5 G: 500; 4 INJECTION, SOLUTION INTRAVENOUS at 03:19

## 2017-01-01 RX ADMIN — MULTIPLE VITAMINS W/ MINERALS TAB 1 TABLET: TAB at 08:40

## 2017-01-01 RX ADMIN — NYSTATIN 500000 UNITS: 100000 SUSPENSION ORAL at 13:15

## 2017-01-01 RX ADMIN — FLUDROCORTISONE ACETATE 100 MCG: 0.1 TABLET ORAL at 11:44

## 2017-01-01 RX ADMIN — IRON SUCROSE 100 MG: 20 INJECTION, SOLUTION INTRAVENOUS at 10:15

## 2017-01-01 RX ADMIN — NYSTATIN 500000 UNITS: 100000 SUSPENSION ORAL at 18:47

## 2017-01-01 RX ADMIN — SODIUM CHLORIDE 250 ML: 9 INJECTION, SOLUTION INTRAVENOUS at 11:15

## 2017-01-01 RX ADMIN — SODIUM CHLORIDE 1000 ML: 9 INJECTION, SOLUTION INTRAVENOUS at 23:15

## 2017-01-01 RX ADMIN — BISOPROLOL FUMARATE 2.5 MG: 5 TABLET, COATED ORAL at 18:50

## 2017-01-01 RX ADMIN — IPRATROPIUM BROMIDE AND ALBUTEROL SULFATE 3 ML: .5; 3 SOLUTION RESPIRATORY (INHALATION) at 06:40

## 2017-01-01 RX ADMIN — CLOPIDOGREL BISULFATE 75 MG: 75 TABLET ORAL at 18:50

## 2017-01-01 RX ADMIN — ATORVASTATIN CALCIUM 40 MG: 40 TABLET, FILM COATED ORAL at 20:46

## 2017-01-01 RX ADMIN — ENOXAPARIN SODIUM 40 MG: 40 INJECTION SUBCUTANEOUS at 09:11

## 2017-01-01 RX ADMIN — IPRATROPIUM BROMIDE AND ALBUTEROL SULFATE 3 ML: .5; 3 SOLUTION RESPIRATORY (INHALATION) at 13:00

## 2017-01-01 RX ADMIN — GUAIFENESIN 600 MG: 600 TABLET, EXTENDED RELEASE ORAL at 09:28

## 2017-01-01 RX ADMIN — CEFTRIAXONE 1 G: 1 INJECTION, SOLUTION INTRAVENOUS at 09:22

## 2017-01-01 RX ADMIN — SODIUM CHLORIDE, POTASSIUM CHLORIDE, SODIUM LACTATE AND CALCIUM CHLORIDE 1000 ML: 600; 310; 30; 20 INJECTION, SOLUTION INTRAVENOUS at 19:39

## 2017-01-01 RX ADMIN — VANCOMYCIN HYDROCHLORIDE 750 MG: 750 INJECTION, SOLUTION INTRAVENOUS at 13:40

## 2017-01-01 RX ADMIN — ACETYLCYSTEINE 4 ML: 200 SOLUTION ORAL; RESPIRATORY (INHALATION) at 12:45

## 2017-01-01 RX ADMIN — IPRATROPIUM BROMIDE AND ALBUTEROL SULFATE 3 ML: .5; 3 SOLUTION RESPIRATORY (INHALATION) at 19:11

## 2017-01-01 RX ADMIN — GUAIFENESIN 600 MG: 600 TABLET, EXTENDED RELEASE ORAL at 21:21

## 2017-01-01 RX ADMIN — AZITHROMYCIN 500 MG: 500 INJECTION, POWDER, LYOPHILIZED, FOR SOLUTION INTRAVENOUS at 08:14

## 2017-01-01 RX ADMIN — ASPIRIN 81 MG: 81 TABLET, COATED ORAL at 08:40

## 2017-01-01 RX ADMIN — SODIUM CHLORIDE 1000 ML: 9 INJECTION, SOLUTION INTRAVENOUS at 00:31

## 2017-01-01 RX ADMIN — DIBASIC SODIUM PHOSPHATE, MONOBASIC POTASSIUM PHOSPHATE AND MONOBASIC SODIUM PHOSPHATE 1 TABLET: 852; 155; 130 TABLET ORAL at 13:15

## 2017-01-01 RX ADMIN — CEFTRIAXONE 1 G: 1 INJECTION, SOLUTION INTRAVENOUS at 13:03

## 2017-01-01 RX ADMIN — AZITHROMYCIN 500 MG: 500 INJECTION, POWDER, LYOPHILIZED, FOR SOLUTION INTRAVENOUS at 09:14

## 2017-01-01 RX ADMIN — NYSTATIN 500000 UNITS: 100000 SUSPENSION ORAL at 08:14

## 2017-01-01 RX ADMIN — NYSTATIN 500000 UNITS: 100000 SUSPENSION ORAL at 21:20

## 2017-01-01 RX ADMIN — MULTIPLE VITAMINS W/ MINERALS TAB 1 TABLET: TAB at 08:35

## 2017-01-01 RX ADMIN — ACETYLCYSTEINE 4 ML: 200 SOLUTION ORAL; RESPIRATORY (INHALATION) at 00:00

## 2017-01-01 RX ADMIN — POTASSIUM CHLORIDE 40 MEQ: 1.5 POWDER, FOR SOLUTION ORAL at 08:40

## 2017-01-01 RX ADMIN — NYSTATIN 500000 UNITS: 100000 SUSPENSION ORAL at 09:15

## 2017-01-01 RX ADMIN — ACETYLCYSTEINE 4 ML: 200 SOLUTION ORAL; RESPIRATORY (INHALATION) at 18:27

## 2017-01-01 RX ADMIN — IPRATROPIUM BROMIDE AND ALBUTEROL SULFATE 3 ML: .5; 3 SOLUTION RESPIRATORY (INHALATION) at 13:11

## 2017-01-01 RX ADMIN — ACETYLCYSTEINE 4 ML: 200 SOLUTION ORAL; RESPIRATORY (INHALATION) at 06:42

## 2017-01-01 RX ADMIN — IRON SUCROSE 100 MG: 20 INJECTION, SOLUTION INTRAVENOUS at 14:49

## 2017-01-01 RX ADMIN — AZITHROMYCIN 500 MG: 500 INJECTION, POWDER, LYOPHILIZED, FOR SOLUTION INTRAVENOUS at 08:16

## 2017-01-01 RX ADMIN — ASPIRIN 81 MG: 81 TABLET, COATED ORAL at 09:14

## 2017-01-01 RX ADMIN — IPRATROPIUM BROMIDE AND ALBUTEROL SULFATE 3 ML: .5; 3 SOLUTION RESPIRATORY (INHALATION) at 18:15

## 2017-01-01 RX ADMIN — MIDODRINE HYDROCHLORIDE 5 MG: 5 TABLET ORAL at 13:15

## 2017-01-01 RX ADMIN — ASPIRIN 81 MG: 81 TABLET, COATED ORAL at 15:09

## 2017-01-01 RX ADMIN — MULTIPLE VITAMINS W/ MINERALS TAB 1 TABLET: TAB at 09:27

## 2017-09-24 NOTE — DISCHARGE INSTRUCTIONS
Rest for the next 1-2 weeks.  Increase fluids at home.      Return to the ER for markedly labored breathing, chest pain, recurrent passing out, new or worsening symptoms.  Follow-up with your family doctor as soon as possible.  If you do not have a family doctor, follow-up with the [facility].  Call for appointment.  Follow-up with Dr. Dr. Roblero, heart specialist, in one to 2 days for workup of the passing out spell.  Call for appointment.    Scalp staples to be removed in our ER in 7 days.

## 2017-09-24 NOTE — ED NOTES
Pt taken off non-rebreather and placed on 4L NC, post breathing treatment, watching O2 sats      Kandice Hairston RN  09/24/17 0006

## 2017-09-24 NOTE — ED PROVIDER NOTES
Subjective   HPI Comments: 76-year-old male was apparently checking out of [employer] and while standing up, got dizzy, lightheaded followed by syncope.  Hit his head on the ground with a laceration to his occipital scalp.  No neck or back pain.  No extremity trauma.  Also has had a productive cough of yellow sputum with some mild shortness of breath for about one month.  Prior to syncope, he had no palpitations, chest pain, abdominal pain, nausea, vomiting, diarrhea.  No urinary complaints.  Noted to be in atrial fibrillation upon arrival here with low O2 saturations.  He has no history of lung trouble according to his knowledge.  Remote smoker.  He has no previous cardiac history.  He describes a mild achy type diffuse headache.  No neck pain.  He is on no blood thinners at home, not even aspirin.    Aggravating factors: None.  Alleviating factors: None.  Treatment prior to arrival: EMS.      History provided by:  Patient  History limited by:  Age   used: No        Review of Systems   Constitutional: Negative.    HENT: Negative.    Eyes: Negative.    Respiratory: Positive for cough and shortness of breath.    Cardiovascular: Negative.  Negative for chest pain.   Gastrointestinal: Negative.  Negative for abdominal pain.   Endocrine: Negative.    Genitourinary: Negative for dysuria.   Musculoskeletal: Negative.    Skin: Negative.    Allergic/Immunologic: Negative.    Neurological: Positive for dizziness and syncope.   Hematological: Negative.    Psychiatric/Behavioral: Negative.    All other systems reviewed and are negative.      History reviewed. No pertinent past medical history.    No Known Allergies    History reviewed. No pertinent surgical history.    History reviewed. No pertinent family history.    Social History     Social History   • Marital status: Unknown     Spouse name: N/A   • Number of children: N/A   • Years of education: N/A     Social History Main Topics   • Smoking  status: Former Smoker   • Smokeless tobacco: Never Used   • Alcohol use No   • Drug use: No   • Sexual activity: Not Asked     Other Topics Concern   • None     Social History Narrative   • None           Objective   Physical Exam   Constitutional: He is oriented to person, place, and time. He appears well-developed and well-nourished. No distress.   HENT:   Head: Normocephalic.   Right Ear: External ear normal.   Left Ear: External ear normal.   The patient has a 3 cm Y-shaped scalp laceration to the crown of his head.   Eyes: EOM are normal. Pupils are equal, round, and reactive to light.   Neck: Normal range of motion. Neck supple.   Cardiovascular: Normal heart sounds.    The patient is tachycardic with a irregularly irregular heart rhythm.   Pulmonary/Chest: No stridor. He has no wheezes. He exhibits no tenderness.   Patient is tachypneic with decreased breath sounds throughout.   Abdominal: Soft. He exhibits no distension. There is no tenderness. There is no rebound and no guarding.   Musculoskeletal: Normal range of motion. He exhibits no edema.   The middle and lower back are nontender to palpation.  The extremities are nontender.   Neurological: He is alert and oriented to person, place, and time.   Skin: Skin is warm and dry. No rash noted. He is not diaphoretic.   Psychiatric: He has a normal mood and affect. His behavior is normal. Judgment and thought content normal.   Nursing note and vitals reviewed.      ECG 12 Lead    Date/Time: 9/23/2017 11:59 PM  Performed by: BETSY SANTAMARIA  Authorized by: BETSY SANTAMARIA   Comments: EKG: Sinus tachycardia at a rate of 112.  Frequent PACs.  Nonspecific ST and T-wave changes.  No infarction.                 ED Course  ED Course   Comment By Time   Dr. Pearl: saw the patient in the ED.  Dr. Pearl and I have explained to the patient that he may die if he does not get admitted for treatment of pneumonia and possible bacteremia.  Also has hypoxia and mild  dehydration, as well as unexplained syncope.  He has a dog and cat at home and lives by himself.  He wants to go home to tend to his pets.  He has no family here with him.  He understands that he could die any time and understands these risks and is willing to sign out against MEdical ADVICE. Veronica Young PA-C 09/24 0005   Procedure: The patient has a 3 cm Y-shaped laceration to the occipital scalp.  The area was scrubbed extensively with saline and Hibiclens mixed.  7, staples were placed with good wound approximation.  No anesthesia, as the pt thought he could tolerate without it.  Patient tolerated the procedure well.  No complications. Veronica Young PA-C 09/24 0043   The patient is satting 90-91 percent on 4 L of oxygen.  As mentioned in my note above, he understands that he could go home and die.  He is willing to take these risks and is signing out AMA. Veronica Young PA-C 09/24 0111                  MDM  Number of Diagnoses or Management Options  Head injury due to trauma, initial encounter: new and requires workup  Pneumonia of left lower lobe due to infectious organism: new and requires workup  Pneumonia of right lower lobe due to infectious organism: new and requires workup  Scalp laceration, initial encounter: new and requires workup  Syncope and collapse: new and requires workup     Amount and/or Complexity of Data Reviewed  Clinical lab tests: reviewed and ordered  Tests in the radiology section of CPT®: reviewed and ordered  Tests in the medicine section of CPT®: ordered and reviewed  Discuss the patient with other providers: yes  Independent visualization of images, tracings, or specimens: yes    Risk of Complications, Morbidity, and/or Mortality  Presenting problems: high  Diagnostic procedures: moderate  Management options: high    Patient Progress  Patient progress: improved      Final diagnoses:   Syncope and collapse   Head injury due to trauma, initial encounter   Scalp laceration, initial  encounter   Pneumonia of right lower lobe due to infectious organism   Pneumonia of left lower lobe due to infectious organism            Veronica Young PA-C  09/24/17 0054       Veronica Young PA-C  09/24/17 0112

## 2017-09-24 NOTE — ED NOTES
PT's O2 sats remain in mid 80's, on 5L NC. Pt placed on non-rebreather @ 15L. MDnotified and ABG and breathing tx ordered     Kandice Hairston RN  09/23/17 9727

## 2017-10-10 NOTE — ED NOTES
"Upon arrival to ED, EMS states pts neighbor called because they \"hadn't seen him out in a few days\". Pt was found in feces and urine per EMS report. Pt states he gets dizzy and falls upon ambulation and that was the reason why he didn't get up to go to the bathroom. Pt able to answer orientation questions, however unable to recall the last time he ate or drank anything. Pt states he has no family or friends to assist with medical care or ADLs. Consult to  put in at this time.      Lili Lovelace RN  10/10/17 1490    "

## 2017-10-10 NOTE — ED PROVIDER NOTES
Subjective   HPI Comments: 76-year-old male presenting after neighbors were concerned about his well-being.  Apparently his neighbors had not heard from him in a few days, EMS and the police were contacted, patient was found laying in bed covered in feces and urine.  He is unsure how long he had been there, he is unsure of the events of the last several days.  He has no specific complaints though just mumbles.  Of note he was here approximately 3 weeks ago and was worked up for syncope and hypoxia.  He was to be admitted but signed out AGAINST MEDICAL ADVICE.      Review of Systems   Constitutional: Negative for chills and fever.   HENT: Negative for congestion, rhinorrhea and sore throat.    Eyes: Negative for pain.   Respiratory: Negative for cough and shortness of breath.    Cardiovascular: Negative for chest pain, palpitations and leg swelling.   Gastrointestinal: Negative for abdominal pain, diarrhea, nausea and vomiting.   Genitourinary: Negative for dysuria.   Musculoskeletal: Negative for arthralgias.   Skin: Negative for rash.   Neurological: Negative for weakness and numbness.   Psychiatric/Behavioral: Negative for behavioral problems.       History reviewed. No pertinent past medical history.    No Known Allergies    History reviewed. No pertinent surgical history.    History reviewed. No pertinent family history.    Social History     Social History   • Marital status: Unknown     Spouse name: N/A   • Number of children: N/A   • Years of education: N/A     Social History Main Topics   • Smoking status: Former Smoker   • Smokeless tobacco: Never Used   • Alcohol use No   • Drug use: No   • Sexual activity: Not Asked     Other Topics Concern   • None     Social History Narrative           Objective   Physical Exam   Constitutional: He is oriented to person, place, and time. No distress.   Chronically ill-appearing, cachectic   HENT:   Head: Normocephalic and atraumatic.   Right Ear: External ear normal.    Left Ear: External ear normal.   Nose: Nose normal.   Mouth/Throat: Oropharynx is clear and moist.   Eyes: Conjunctivae and EOM are normal. Pupils are equal, round, and reactive to light.   Neck: Normal range of motion. Neck supple.   Cardiovascular: Normal rate, regular rhythm, normal heart sounds and intact distal pulses.    Pulmonary/Chest: Effort normal and breath sounds normal. No respiratory distress.   Abdominal: Soft. Bowel sounds are normal. He exhibits no distension. There is no tenderness. There is no rebound and no guarding.   Musculoskeletal: Normal range of motion. He exhibits no edema, tenderness or deformity.   Contusions to bilateral knees   Neurological: He is alert and oriented to person, place, and time.   He is alert and oriented though otherwise just mumbles, moves all extremities   Skin: Skin is warm and dry. No rash noted.   Repaired laceration to the occipital scalp   Psychiatric:   Unable to accurately assess   Nursing note and vitals reviewed.      Procedures         ED Course  ED Course                  MDM  Number of Diagnoses or Management Options  Diagnosis management comments: 76-year-old male found in bed covered in feces and urine after neighbors were concerned about his well-being.  Chronically ill-appearing cachectic man in no distress with normal vital signs other than hypothermia.  We'll check labs, imaging of the head and chest.  We'll hydrate.  Disposition pending workup.    DDX: UTI, pneumonia, intracranial hemorrhage, dehydration, electrolyte abnormality, acute renal failure    EKG: Sinus rhythm, no STEMI      Final diagnoses:   Acute encephalopathy   Elevated lactic acid level   Cachexia       I assumed care at change of shift.  Patient is hemodynamically stable but I do believe he needs to be admitted due to his encephalopathy.  One moment he can always conversation the next moment the patient has difficulty directing his thoughts.  He is cachectic and obviously is not  able to take care of himself at home.  The lactic acid is improving with IV fluids.     Nakul Wyatt MD  10/11/17 0023

## 2017-10-10 NOTE — PROGRESS NOTES
Pt lives alone with his dog and cat.  Neighbors had not seen him in a few days and  Called 9-1-1-.  Pt was covered in urine and feces and states he did not remember how long it had been since he ate or drank.  Patient very poor hygiene.    States he has no family around  To  Contact.    Pt  Had difficulty answering questions.  Pt's core temp was 95.8.    Pt does not know if he is willing to stay in the hospital.      Spoke to Lili GUPTA  And Dr Morillo about plan  Of care    Will have  to follow up.Continued Stay Note  [facility]     Patient Name: Master Hall  MRN: [id number]  Today's Date: 10/10/2017    Admit Date: 10/10/2017          Discharge Plan     None              Discharge Codes     None            Lisa Peterson RN

## 2017-10-10 NOTE — ED NOTES
Spoke with April from . Dr. Ilia mercado, left for the shift. Stated that if needed to be admitted and danger to self would need to speak to hospitalist for admission.     Amy Parson RN  10/1940

## 2017-10-10 NOTE — ED NOTES
Paged case management, no answer at this time. Will continue to attempt to contact.     Mary Parker  10/10/17 0164

## 2017-10-10 NOTE — ED NOTES
Attempted to contact case management and  at this time. No call was answered and no previous calls were returned at this time.      Mary Parker  10/10/17 1755

## 2017-10-11 PROBLEM — R64 CACHEXIA (HCC): Status: ACTIVE | Noted: 2017-01-01

## 2017-10-11 PROBLEM — J18.9 PNEUMONIA: Status: ACTIVE | Noted: 2017-01-01

## 2017-10-11 PROBLEM — A41.9 SEPSIS, UNSPECIFIED ORGANISM (HCC): Status: ACTIVE | Noted: 2017-01-01

## 2017-10-11 PROBLEM — R79.89 ELEVATED LACTIC ACID LEVEL: Status: ACTIVE | Noted: 2017-01-01

## 2017-10-11 PROBLEM — G93.41 ACUTE METABOLIC ENCEPHALOPATHY: Status: ACTIVE | Noted: 2017-01-01

## 2017-10-11 PROBLEM — G93.40 ACUTE ENCEPHALOPATHY: Status: ACTIVE | Noted: 2017-01-01

## 2017-10-11 PROBLEM — T68.XXXA HYPOTHERMIA: Status: ACTIVE | Noted: 2017-01-01

## 2017-10-11 NOTE — SIGNIFICANT NOTE
10/11/17 1159   Rehab Treatment   Discipline physical therapist   Rehab Evaluation   Evaluation Not Performed unable to evaluate, medical status change  (Pt being tranferred to ICU d/t decline in medical condition.  Hold PT eval.)

## 2017-10-11 NOTE — PROGRESS NOTES
[facility]    PROGRESS NOTE    Name:  Master Hall   Age:  76 y.o.  Sex:  male  :  1940  MRN:  [id number]   Visit Number:  [id number]  Admission Date:  10/10/2017  Date Of Service:  10/11/17  Primary Care Physician:  No Known Provider     LOS: 0 days :  Patient Care Team:  No Known Provider as PCP - General:    History taken from:     patient    Chief Complaint:      Hypoxia    Subjective     Interval History:     Patient seen today.  Patient was admitted for sepsis with bilateral pneumonia.  He also has metabolic encephalopathy.  He is cachectic, and was found in urine and feces at home.  He was hypothermic with a temperature of 95.8.  Reviewed history and physical, lab work, x-rays.  He apparently had pneumonia last month inside out AMA but was prescribed albuterol and Levaquin.  He was warmed up to 97 axillary temperature.  He did meet criteria for sepsis.  He was placed on 2 L O2.  CT showed bilateral pneumonia.  He did receive 2 L normal saline bolus.  He was seen earlier in the day and had a lot of secretions so Mucomyst, guaifenesin and Tessalon Perles were added.  He was coughing so much, that he could not give much of a history.  Later in the morning, patient was noted be hypoxic, with SPO2 in the 80s, was advanced to a nonrebreather and transferred to the intensive care unit.  He was encephalopathic and not keeping the mask on.  Restraints have been applied.  He is a very poor historian and really unable to communicate what his history is.  His lactic acid was again increased, so he was given another fluid bolus, and Zithromax was added to the vancomycin and Zosyn.  Urinalysis was noted to be negative.  He's also noted to be severely iron deficient, we'll give him some iron infusions.  Repeat chest x-ray was done which was negative.  ABG was ordered on 100% nonrebreather which showed O2 sat of 91.0 PO2 of 63.6, PCO2 of 32.8    Review of Systems:     Unable to  assess.    Objective     Vital Signs:    Temp:  [94.5 °F (34.7 °C)-97.1 °F (36.2 °C)] 96.6 °F (35.9 °C)  Heart Rate:  [] 98  Resp:  [16-18] 18  BP: (100-133)/() 112/83    Physical Exam:      General Appearance:    Confused, not cooperating with therapies.     Head:    Normocephalic, without obvious abnormality, atraumatic   Eyes:            Lids and lashes normal, conjunctivae and sclerae normal, no   icterus, no pallor, corneas clear, PERRLA   Ears:    Ears appear intact with no abnormalities noted   Throat:   No oral lesions, no thrush, oral mucosa moist   Neck:   No adenopathy, supple, trachea midline, no thyromegaly, no   carotid bruit, no JVD   Back:     No kyphosis present, no scoliosis present, no skin lesions,      erythema or scars, no tenderness to percussion or                   palpation,   range of motion normal   Lungs:     Diffuse rhonchi noted bilaterally with some use of accessory muscles respiration.      Heart:    Regular rhythm and normal rate, normal S1 and S2, no            murmur, no gallop, no rub, no click   Chest Wall:    No abnormalities observed   Abdomen:     Normal bowel sounds, no masses, no organomegaly, soft        non-tender, non-distended, no guarding, no rebound                tenderness   Rectal:     Deferred   Extremities:   Moves all extremities well, no edema, no cyanosis, no             redness   Pulses:   Pulses palpable and equal bilaterally   Skin:   No bleeding, bruising or rash   Lymph nodes:   No palpable adenopathy   Neurologic:   Cranial nerves 2 - 12 grossly intact, sensation intact, DTR       present and equal bilaterally          Results Review:      I reviewed the patient's new clinical results.    Labs:  Lab Results (last 24 hours)     Procedure Component Value Units Date/Time    Blood Gas, Arterial With Co-Ox [[id number]]  (Abnormal) Collected:  10/10/17 1813    Specimen:  Arterial Blood Updated:  10/10/17 1815     Site Arterial: left brachial      Ramon's Test N/A     pH, Arterial 7.514 (C) pH units      pCO2, Arterial 29.2 (C) mm Hg      pO2, Arterial 81.4 mm Hg      HCO3, Arterial 23.5 mmol/L      Base Excess, Arterial 0.6 mmol/L      O2 Saturation, Arterial 96.4 %      Hemoglobin, Blood Gas 12.6 g/dL      Oxyhemoglobin 94.9 %      Methemoglobin 0.80 %      Carboxyhemoglobin 0.8 %      Modality Cannula - Nasal     FIO2 28 %     Lipase [[id number]]  (Normal) Collected:  10/10/17 1726    Specimen:  Blood Updated:  10/10/17 1821     Lipase 70 U/L     CK [[id number]]  (Normal) Collected:  10/10/17 1726    Specimen:  Blood Updated:  10/10/17 1821     Creatine Kinase 35 U/L     Narrative:       The relative CKMB index is statistically unreliable if the CK is < or equal to 40 U/L.    Troponin [[id number]]  (Normal) Collected:  10/10/17 1726    Specimen:  Blood Updated:  10/10/17 1822     Troponin I 0.018 ng/mL     Comprehensive Metabolic Panel [[id number]]  (Abnormal) Collected:  10/10/17 1726    Specimen:  Blood Updated:  10/10/17 1822     Glucose 119 (H) mg/dL      BUN 51 (H) mg/dL      Creatinine 1.00 mg/dL      Sodium 140 mmol/L      Potassium 4.5 mmol/L      Chloride 104 mmol/L      CO2 24.0 (L) mmol/L      Calcium 9.5 mg/dL      Total Protein 6.4 g/dL      Albumin 3.10 (L) g/dL      ALT (SGPT) 37 U/L      AST (SGOT) 26 U/L      Alkaline Phosphatase 147 (H) U/L      Total Bilirubin 0.9 mg/dL      eGFR Non African Amer 73 mL/min/1.73      Globulin 3.3 gm/dL      A/G Ratio 0.9 (L) g/dL      BUN/Creatinine Ratio 51.0 (H)     Anion Gap 16.5 mmol/L     Narrative:       The MDRD GFR formula is only valid for adults with stable renal function between ages 18 and 70.    BNP [[id number]]  (Abnormal) Collected:  10/10/17 1726    Specimen:  Blood Updated:  10/10/17 1824     proBNP 1120.0 (C) pg/mL     Protime-INR [[id number]]  (Abnormal) Collected:  10/10/17 1726    Specimen:  Blood Updated:  10/10/17 1830     Protime 17.3 (H) Seconds      INR 1.58 (H)    aPTT [[id number]]   (Abnormal) Collected:  10/10/17 1726    Specimen:  Blood Updated:  10/10/17 1830     PTT 21.4 (L) seconds     Light Blue Top [[id number]] Collected:  10/10/17 1726    Specimen:  Blood Updated:  10/10/17 1831     Extra Tube hold for add-on      Auto resulted       Lavender Top [[id number]] Collected:  10/10/17 1726    Specimen:  Blood Updated:  10/10/17 1831     Extra Tube hold for add-on      Auto resulted       Gold Top - SST [[id number]] Collected:  10/10/17 1726    Specimen:  Blood Updated:  10/10/17 1831     Extra Tube Hold for add-ons.      Auto resulted.       Green Top (No Gel) [[id number]] Collected:  10/10/17 1726    Specimen:  Blood Updated:  10/10/17 1831     Extra Tube Hold for add-ons.      Auto resulted.       [place] Draw [[id number]] Collected:  10/10/17 1726    Specimen:  Blood Updated:  10/10/17 1831    Narrative:       The following orders were created for panel order [place] Draw.  Procedure                               Abnormality         Status                     ---------                               -----------         ------                     Light Blue Top[[id number]]                                   Final result               Lavender Top[[id number]]                                     Final result               Gold Top - SST[[id number]]                                   Final result               Green Top (No Gel)[[id number]]                               Final result                 Please view results for these tests on the individual orders.    CBC & Differential [[id number]] Collected:  10/10/17 1726    Specimen:  Blood Updated:  10/10/17 1839    Narrative:       The following orders were created for panel order CBC & Differential.  Procedure                               Abnormality         Status                     ---------                               -----------         ------                     CBC Auto Differential[[id number]]        Abnormal            Final result                  Please view results for these tests on the individual orders.    CBC Auto Differential [[id number]]  (Abnormal) Collected:  10/10/17 1726    Specimen:  Blood Updated:  10/10/17 1839     WBC 11.98 (H) 10*3/mm3      RBC 4.37 (L) 10*6/mm3      Hemoglobin 12.3 (L) g/dL      Hematocrit 38.4 (L) %      MCV 87.9 fL      MCH 28.1 pg      MCHC 32.0 g/dL      RDW 16.1 (H) %      RDW-SD 50.2 fl      MPV 12.9 (H) fL      Platelets 102 (L) 10*3/mm3      Neutrophil % 91.0 (H) %      Lymphocyte % 1.9 (L) %      Monocyte % 6.5 %      Eosinophil % 0.0 %      Basophil % 0.1 %      Immature Grans % 0.5 %      Neutrophils, Absolute 10.90 (H) 10*3/mm3      Lymphocytes, Absolute 0.23 (L) 10*3/mm3      Monocytes, Absolute 0.78 10*3/mm3      Eosinophils, Absolute 0.00 10*3/mm3      Basophils, Absolute 0.01 10*3/mm3      Immature Grans, Absolute 0.06 10*3/mm3      nRBC 0.0 /100 WBC     Salicylate Level [[id number]]  (Abnormal) Collected:  10/10/17 1726    Specimen:  Blood Updated:  10/10/17 1851     Salicylate <1.0 (L) mg/dL     TSH [[id number]]  (Normal) Collected:  10/10/17 1726    Specimen:  Blood Updated:  10/10/17 1851     TSH 4.240 mIU/mL     Acetaminophen Level [[id number]]  (Normal) Collected:  10/10/17 1726    Specimen:  Blood Updated:  10/10/17 1851     Acetaminophen <10.0 mcg/mL     Narrative:       Toxic = Greater than 150 mcg/mL    Lactic Acid, Plasma [[id number]]  (Abnormal) Collected:  10/10/17 1905    Specimen:  Blood Updated:  10/10/17 1921     Lactate 2.6 (C) mmol/L     Lactic Acid, Plasma [[id number]] Collected:  10/10/17 1905    Specimen:  Blood Updated:  10/10/17 2231     Extra Tube Hold for add-ons.      Auto resulted.       Urinalysis With / Culture If Indicated - Urine, Catheter [[id number]]  (Abnormal) Collected:  10/10/17 2317    Specimen:  Urine from Urine, Catheter Updated:  10/10/17 1513     Color, UA Yellow     Appearance, UA Slightly Cloudy (A)     pH, UA <=5.0     Specific Gravity, UA 1.025     Glucose, UA  Negative     Ketones, UA Negative     Bilirubin, UA Negative     Blood, UA Small (1+) (A)     Protein, UA 30 mg/dL (1+) (A)     Leuk Esterase, UA Negative     Nitrite, UA Negative     Urobilinogen, UA 0.2 E.U./dL    Urinalysis, Microscopic Only - Urine, Clean Catch [[id number]]  (Abnormal) Collected:  10/10/17 2314    Specimen:  Urine from Urine, Catheter Updated:  10/10/17 2343     RBC, UA 6-12 (A) /HPF      WBC, UA None Seen /HPF      Bacteria, UA None Seen /HPF      Squamous Epithelial Cells, UA None Seen /HPF      Hyaline Casts, UA 3-6 /LPF      Uric Acid Crystals, UA Moderate/2+ /HPF      Methodology Manual Light Microscopy    Lactic Acid, Reflex [[id number]]  (Abnormal) Collected:  10/10/17 2314    Specimen:  Blood Updated:  10/10/17 2357     Lactate 2.2 (C) mmol/L     Ammonia [[id number]]  (Abnormal) Collected:  10/11/17 0255    Specimen:  Blood Updated:  10/11/17 0313     Ammonia <9 (L) umol/L     CBC & Differential [[id number]] Collected:  10/11/17 0528    Specimen:  Blood Updated:  10/11/17 0622    Narrative:       The following orders were created for panel order CBC & Differential.  Procedure                               Abnormality         Status                     ---------                               -----------         ------                     CBC Auto Differential[[id number]]        Abnormal            Final result                 Please view results for these tests on the individual orders.    CBC Auto Differential [[id number]]  (Abnormal) Collected:  10/11/17 0528    Specimen:  Blood Updated:  10/11/17 0622     WBC 13.24 (H) 10*3/mm3      RBC 4.29 (L) 10*6/mm3      Hemoglobin 12.4 (L) g/dL      Hematocrit 38.0 (L) %      MCV 88.6 fL      MCH 28.9 pg      MCHC 32.6 g/dL      RDW 16.2 (H) %      RDW-SD 51.0 fl      MPV 11.7 fL      Platelets 125 (L) 10*3/mm3      Neutrophil % 91.9 (H) %      Lymphocyte % 1.4 (L) %      Monocyte % 6.0 %      Eosinophil % 0.0 %      Basophil % 0.2 %      Immature  Grans % 0.5 %      Neutrophils, Absolute 12.17 (H) 10*3/mm3      Lymphocytes, Absolute 0.19 (L) 10*3/mm3      Monocytes, Absolute 0.79 10*3/mm3      Eosinophils, Absolute 0.00 10*3/mm3      Basophils, Absolute 0.02 10*3/mm3      Immature Grans, Absolute 0.07 (H) 10*3/mm3      nRBC 0.0 /100 WBC     Blood Culture - Blood, [[id number]]  (Normal) Collected:  10/10/17 1814    Specimen:  Blood from Arm, Right Updated:  10/11/17 0631     Blood Culture No growth at less than 24 hours    Blood Culture - Blood, [[id number]]  (Normal) Collected:  10/10/17 1819    Specimen:  Blood from Arm, Left Updated:  10/11/17 0631     Blood Culture No growth at less than 24 hours    Lactic Acid, Plasma [[id number]]  (Abnormal) Collected:  10/11/17 0528    Specimen:  Blood Updated:  10/11/17 0643     Lactate 2.7 (C) mmol/L     Troponin [[id number]]  (Normal) Collected:  10/11/17 0528    Specimen:  Blood Updated:  10/11/17 0644     Troponin I <0.012 ng/mL     Narrative:       Normal Patient Upper Reference Limit (URL) (99th Percentile)=0.03 ng/mL   Non-AMI Illness Reference Limit=0.03-0.11 ng/mL   AMI Confirmation=0.12 ng/mL and above    CK [[id number]]  (Normal) Collected:  10/11/17 0528    Specimen:  Blood Updated:  10/11/17 0644     Creatine Kinase 46 U/L     Basic Metabolic Panel [[id number]]  (Abnormal) Collected:  10/11/17 0528    Specimen:  Blood Updated:  10/11/17 0658     Glucose 195 (H) mg/dL      BUN 50 (H) mg/dL      Creatinine 1.00 mg/dL      Sodium 144 mmol/L      Potassium 4.1 mmol/L      Chloride 107 mmol/L      CO2 25.0 (L) mmol/L      Calcium 9.1 mg/dL      eGFR Non African Amer 73 mL/min/1.73      BUN/Creatinine Ratio 50.0 (H)     Anion Gap 16.1 mmol/L     Narrative:       The MDRD GFR formula is only valid for adults with stable renal function between ages 18 and 70.    Iron Profile [[id number]]  (Abnormal) Collected:  10/11/17 0528    Specimen:  Blood Updated:  10/11/17 0728     Iron <10 (L) mcg/dL      TIBC 183 (L) mcg/dL       Iron Saturation -- %       Unable to calculate.       Folate [[id number]]  (Normal) Collected:  10/11/17 0528    Specimen:  Blood Updated:  10/11/17 0742     Folate 11.20 ng/mL     Vitamin B12 [[id number]]  (Abnormal) Collected:  10/11/17 0528    Specimen:  Blood Updated:  10/11/17 0742     Vitamin B-12 >1000 (H) pg/mL     Ferritin [[id number]]  (Abnormal) Collected:  10/11/17 0528    Specimen:  Blood Updated:  10/11/17 0826     Ferritin 1120.00 (H) ng/mL     Blood Gas, Arterial With Co-Ox [[id number]]  (Abnormal) Collected:  10/11/17 1120    Specimen:  Arterial Blood Updated:  10/11/17 1123     Site Arterial: left radial     Ramon's Test Positive     pH, Arterial 7.430 pH units      pCO2, Arterial 32.8 (L) mm Hg      pO2, Arterial 63.6 (L) mm Hg      HCO3, Arterial 21.7 (L) mmol/L      Base Excess, Arterial -2.6 mmol/L      O2 Saturation, Arterial 91.0 %      Hemoglobin, Blood Gas 12.2 g/dL      Oxyhemoglobin 89.2 (L) %      Methemoglobin 1.00 %      Carboxyhemoglobin 1.0 %      Modality Mask - Nonbreather     FIO2 100 %            Radiology:    Imaging Results (last 24 hours)     Procedure Component Value Units Date/Time    CT Head Without Contrast [[id number]] Collected:  10/10/17 1852     Updated:  10/10/17 1854    Narrative:       FINAL REPORT    TECHNIQUE:  Axial images through the head was performed without contrast.This study was performed with techniques to keep radiation doses as low as reasonably achievable, (ALARA). Individualized dose reduction techniques using automated exposure control or   adjustment of mA and/or kV according to the patient's size were employed.    CLINICAL HISTORY:  AMS    FINDINGS:  There is moderate generalized atrophy. No abnormal density is seen.  Ventricles are normal.  There is no hemorrhage.  No mass effect is seen.    Bone windows show no evidence of fracture.      Impression:       No acute findings    Authenticated by YADI Pollard MD on 10/10/2017 06:52:04 PM    XR Chest 2  View [[id number]] Collected:  10/11/17 0733     Updated:  10/11/17 0736    Narrative:       PROCEDURE: XR CHEST 2 VW-        HISTORY: ams, hypothermia     COMPARISON: September 23, 2017.     FINDINGS: The heart is normal in size. The mediastinum is unremarkable.  There are emphysematous changes to the lungs. Patchy basilar opacities  are similar to the prior exam. There is no pneumothorax. There are no  acute osseous abnormalities.           Impression:       Emphysematous changes with patchy basilar opacities which  may reflect atelectasis or pneumonia.           This report was finalized on 10/11/2017 7:34 AM by Yohan Lambert M.D..    CT Chest Without Contrast [[id number]] Collected:  10/11/17 0734     Updated:  10/11/17 0737    Narrative:       PROCEDURE: CT CHEST WO CONTRAST-     HISTORY: cough, shortness of breath, hypoxia, sepsis, altered mental  status; G93.40-Encephalopathy, unspecified; R79.89-Other specified  abnormal findings of blood chemistry; E27-[id number]     COMPARISON:  None .     PROCEDURE:  Multiple axial CT images were obtained from the thoracic  inlet through the upper abdomen without the use of contrast.      FINDINGS:   Soft tissue windows reveal no axillary, hilar or mediastinal adenopathy.  Heart size is normal. The aorta is normal in caliber. There are no  pleural or pericardial effusions. Lung windows reveal patchy opacities  in the bilateral upper and lower lobes consistent with a pneumonia. The  visualized upper abdomen is unremarkable. Bone windows reveal no acute  osseous abnormalities.       Impression:       Diffuse patchy bilateral airspace disease consistent with a  pneumonia.     787.88 mGy.cm          This study was performed with techniques to keep radiation doses as low  as reasonably achievable (ALARA). Individualized dose reduction  techniques using automated exposure control or adjustment of mA and/or  kV according to the patient size were employed.      This report was  finalized on 10/11/2017 7:35 AM by Yohan Lambert M.D..    CT Lumbar Spine Without Contrast [[id number]] Collected:  10/11/17 0735     Updated:  10/11/17 0738    Narrative:       PROCEDURE: CT LUMBAR SPINE WO CONTRAST-     HISTORY: abnormal gait, incontinence of bowel and bladder;  G93.40-Encephalopathy, unspecified; R79.89-Other specified abnormal  findings of blood chemistry; P50-[id number]     TECHNIQUE: Multiple axial CT images were obtained through the lumbar  spine using bone window algorithms. Coronal and sagittal images were  reconstructed from the original axial data set.      FINDINGS: The lumbar spine is well aligned with no acute fractures.  There are broad-based disc bulges from the L2 level distally producing  mild-to-moderate central stenosis at multiple levels. The paraspinal  soft tissues are unremarkable.            Impression:       Degenerative change with no acute osseous abnormalities.                              This study was performed with techniques to keep radiation doses as low  as reasonably achievable (ALARA). Individualized dose reduction  techniques using automated exposure control or adjustment of mA and/or  kV according to the patient size were employed.         This report was finalized on 10/11/2017 7:36 AM by Yohan Lambert M.D..          Medication Review:       acetylcysteine 4 mL Nebulization Q6H - RT   azithromycin 500 mg Intravenous Q24H   enoxaparin 40 mg Subcutaneous Daily   famotidine 20 mg Oral BID   guaiFENesin 600 mg Oral Q12H   ipratropium-albuterol 3 mL Nebulization Q6H - RT   iron sucrose (VENOFER) IVPB 100 mg Intravenous Q24H   multivitamin with minerals 1 tablet Oral Daily   piperacillin-tazobactam 4.5 g Intravenous Q8H   vancomycin 750 mg Intravenous Q36H         dextrose 5 % and sodium chloride 0.9 % 75 mL/hr Last Rate: 75 mL/hr (10/11/17 1026)   Pharmacy to dose vancomycin         Assessment/Plan     Problem List Items Addressed This Visit     Sepsis,  unspecified organism    Relevant Medications    levoFLOXacin (LEVAQUIN) 500 MG tablet    * (Principal)Acute metabolic encephalopathy - Primary    Cachexia          Sepsis, present on admission, unknown source or organism                        With suspected pneumonia and infection, hypothermia, heart rate greater than 90, elevated lactic acid  Suspect pneumonia, not seen with dehydration, present on admission   Acute hypoxia   Acute metabolic encephalopathy  Abnormal gait with fall at home  Failure to thrive with cachexia and malnutrition consider related to dementia with poor oral intake vs possible malignancy  Suspect chronic dementia  Sacral decubitus ulcer, present on admission  Hypoalbuminemia  Recent scalp laceration after fall at home  Bilateral ecchymoses knees, from crawling on knees at home with left knee abrasion  Very poor hygeine, with severe malnutrition  Elevated BNP  Elevated Lactic Acid  Dehydration  Suspected COPD  Anemia likely of chronic disease, with some iron deficiency  Staph epidermidis bacteremia last month  History of rib fracture    Plan:    We'll check lab work in the a.m.  Transferred to the intensive care unit.  Consulted Dr. Calle, spoke to him.  We'll apply restraints as the patient's not leaving his oxygen on.  He is a full code at this point.  Continue with vancomycin, Zosyn and Zithromax.  Continue with IV fluids at 75 cc per hour.  Will give IV iron.  Check Hemoccults stool.  If patient should deteriorate, may need to be intubated.  Continue with Lovenox for DVT prophylaxis.  Further recommendations will depend on the clinical course.  Critical care time spent 35 minutes.    Lenny Darnell DO  10/11/17  11:58 AM

## 2017-10-11 NOTE — ED NOTES
Neighbor- Stephanie Lopes [phone] has pt dog. Requesting to be called with updates. Established password with pt: Little Bit.      Amy Parson RN  10/10/17 0359

## 2017-10-11 NOTE — H&P
[facility] HOSPITALIST   HISTORY AND PHYSICAL      Name:  Master Hall   Age:  76 y.o.  Sex:  male  :  1940  MRN:  [id number]   Visit Number:  [id number]  Admission Date:  10/10/2017  Date Of Service:  10/11/17  Primary Care Physician:  No Known Provider    Chief Complaint: altered mental status      History Of Presenting Illness:  Patient is a cachectic chronically ill appearing  gentleman, appearing unkept with poor hygeine, who presented to ER via EMS with altered mental status. The patient reportedly lives alone, without family or friend to assist in his care but he does care for a dog and cat at home. EMS reported patient found lying in bed, covered in his own urine and feces. IT is unknown how long he had been lying there. Patient presented with core temp 95.8. He had intermittent confusion and difficulty answering questions. He is alert to self only.    In ER, labs obtained with elevated WBC, hypothermia less than 96. He was warmed with blankets. He was placed on 2 liter nasal cannula with oxygen saturation 92%. Urinalysis negative for infection. He remained incontinent of urine and states he also stools on himself. He was unsure if it was confusion or other, but reports over a month duration of this. Chest xray showed chronic intersitial changes preliminary read. CT head with moderate atrophy without acute abnormality. He was provided 1 liter normal saline. His mentation did not improves, as he remained further confused. Considered sepsis. Hospitalist admitted for further evaluation and workup and treatment.     Later after warmed to 97 axillary temp, patient reports having intermittent cough, pain in chest unable to describe with shortness of breath, worse over the past 1-1.5 months. He admits to feeling so weak, he has been unable to walk safely and had passed out. Last month, patient had been seen in ER with syncope, head laceration with staples, found to have  "pneumonia on 5 liter nasal cannula and signed out AMA but was prescribed albuterol and levaquin. Further details could not be obtained from patient.       Review Of Systems:     General ROS: Patient denies any fevers, chills or loss of consciousness. Complains of generalized weakness.   Psychological ROS: Denies any hallucinations and delusions.  Ophthalmic ROS: Denies any diplopia or transient loss of vision.  ENT ROS: Denies sore throat, nasal congestion or ear pain.   Allergy and Immunology ROS: Denies rash or itching.  Hematological and Lymphatic ROS: Denies neck swelling or easy bleeding.  Endocrine ROS: Denies any recent unintentional weight gain or loss.  Breast ROS: Denies any pain or swelling.  Respiratory ROS: Positive cough with  shortness of breath.   Cardiovascular ROS: Denies chest pain or palpitations. No history of exertional chest pain.  Gastrointestinal ROS: Denies nausea and vomiting. Denies any abdominal pain. No diarrhea.  Genito-Urinary ROS: Denies dysuria or hematuria.  Musculoskeletal ROS: Denies back pain. No muscle pain. No calf pain.   Neurological ROS: Denies any focal weakness. No loss of consciousness. Denies any numbness. Denies neck pain.   Dermatological ROS: Denies any redness or pruritis.       Past Medical History:  History of rib fractures  Suspected COPD    History reviewed. No pertinent past medical history.    Past Surgical history:    History reviewed. No pertinent surgical history.    Social History:  Pediatric History   Patient Guardian Status   • Not on file.     Other Topics Concern   • Not on file     Social History Narrative    . Lives alone. Neighbor called EMS because patient hadn't been seen in a few days. Patient has declined since his wife had passed away.     Patient reports history of smoking. Denies current smoking, alcohol, or drugs. He reported prior recent past was driving still and had been able to get groceries until \"I ran out of gas\".       Family " History:    History reviewed. No pertinent family history.    Allergies:      Review of patient's allergies indicates no known allergies.    Home Medications:    Prior to Admission Medications     Prescriptions Last Dose Informant Patient Reported? Taking?    albuterol (PROVENTIL HFA;VENTOLIN HFA) 108 (90 Base) MCG/ACT inhaler   No No    Inhale 2 puffs Every 4 (Four) Hours As Needed for Wheezing.    levoFLOXacin (LEVAQUIN) 500 MG tablet   No No    Take 1 tablet by mouth Daily.             ED Medications:    Medications   sodium chloride 0.9 % flush 10 mL (not administered)   vancomycin in dextrose 5% 150 mL (VANCOCIN) IVPB 750 mg (not administered)     And   Pharmacy to dose vancomycin (not administered)   ipratropium-albuterol (DUO-NEB) nebulizer solution 3 mL (not administered)   ipratropium-albuterol (DUO-NEB) nebulizer solution 3 mL (not administered)   dextrose 5 % and sodium chloride 0.9 % infusion (not administered)   piperacillin-tazobactam (ZOSYN) 4.5 g in iso-osmotic dextrose 100 mL IVPB (premix) (not administered)   piperacillin-tazobactam (ZOSYN) 4.5 g in iso-osmotic dextrose 100 mL IVPB (premix) (not administered)   lactated ringers infusion 1,000 mL (0 mL Intravenous Stopped 10/10/17 2119)   sodium chloride 0.9 % bolus 1,000 mL (1,000 mL Intravenous Handoff 10/11/17 0054)       Vital Signs:    Temp:  [95.8 °F (35.4 °C)-96.7 °F (35.9 °C)] 96.7 °F (35.9 °C)  Heart Rate:  [] 79  Resp:  [16-18] 18  BP: (100-133)/() 102/65      Intake/Output Summary (Last 24 hours) at 10/11/17 0252  Last data filed at 10/10/17 2119   Gross per 24 hour   Intake             1000 ml   Output                0 ml   Net             1000 ml       Last 3 weights    10/10/17  1717   Weight: 95 lb (43.1 kg)       Body mass index is 12.2 kg/(m^2).    Physical Exam:      General Appearance:    Alert to person. Mumbling. Fairly cooperative, no acute distress, oriented  To self only. Chronically ill and severely cachectic  appearing. Hair unclean, unkept   Head:    Atraumatic and normocephalic, without obvious defect.   Eyes:            PERRLA, conjunctivae and sclerae normal, no icterus. No pallor. Extra-occular movements are within normal limits.   Ears:    Ears appear intact with no abnormalities noted.   Throat:   No oral lesions, no thrush, oral mucosa dry   Neck:   Supple, trachea midline, no thyromegaly, no carotid bruit.   Back:      No pain. No tenderness to palpation,   range of motion normal.   Lungs:     Chest shape is normal. Breath sounds heard bilaterally equally reduced.  No crackles or wheezing. No Pleural rub or bronchial breathing.      Heart:    Normal S1 and S2, no murmur, no gallop   Abdomen:     Normal bowel sounds, no masses, no organomegaly. Soft        non-tender, non-distended, no guarding, no rebound                tenderness   Extremities:   Moves all extremities well but slowly, bilateral ankle pitting edema without lower leg edema,  no cyanosis, noclubbing   Pulses:   Pulses palpable and equal bilaterally   Skin:   No bleeding, bruising or rash   Lymph nodes:   No palpable adenopathy   Neurologic:   Cranial nerves 2 - 12 grossly intact, sensation intact, Motor power is normal and equal bilaterally.       EKG:    Sinus no st elevation     Labs:    Lab Results (last 24 hours)     Procedure Component Value Units Date/Time    Blood Gas, Arterial With Co-Ox [[id number]]  (Abnormal) Collected:  10/10/17 1813    Specimen:  Arterial Blood Updated:  10/10/17 1815     Site Arterial: left brachial     Ramon's Test N/A     pH, Arterial 7.514 (C) pH units      pCO2, Arterial 29.2 (C) mm Hg      pO2, Arterial 81.4 mm Hg      HCO3, Arterial 23.5 mmol/L      Base Excess, Arterial 0.6 mmol/L      O2 Saturation, Arterial 96.4 %      Hemoglobin, Blood Gas 12.6 g/dL      Oxyhemoglobin 94.9 %      Methemoglobin 0.80 %      Carboxyhemoglobin 0.8 %      Modality Cannula - Nasal     FIO2 28 %     Lipase [[id number]]  (Normal)  Collected:  10/10/17 1726    Specimen:  Blood Updated:  10/10/17 1821     Lipase 70 U/L     CK [[id number]]  (Normal) Collected:  10/10/17 1726    Specimen:  Blood Updated:  10/10/17 1821     Creatine Kinase 35 U/L     Narrative:       The relative CKMB index is statistically unreliable if the CK is < or equal to 40 U/L.    Troponin [[id number]]  (Normal) Collected:  10/10/17 1726    Specimen:  Blood Updated:  10/10/17 1822     Troponin I 0.018 ng/mL     Comprehensive Metabolic Panel [[id number]]  (Abnormal) Collected:  10/10/17 1726    Specimen:  Blood Updated:  10/10/17 1822     Glucose 119 (H) mg/dL      BUN 51 (H) mg/dL      Creatinine 1.00 mg/dL      Sodium 140 mmol/L      Potassium 4.5 mmol/L      Chloride 104 mmol/L      CO2 24.0 (L) mmol/L      Calcium 9.5 mg/dL      Total Protein 6.4 g/dL      Albumin 3.10 (L) g/dL      ALT (SGPT) 37 U/L      AST (SGOT) 26 U/L      Alkaline Phosphatase 147 (H) U/L      Total Bilirubin 0.9 mg/dL      eGFR Non African Amer 73 mL/min/1.73      Globulin 3.3 gm/dL      A/G Ratio 0.9 (L) g/dL      BUN/Creatinine Ratio 51.0 (H)     Anion Gap 16.5 mmol/L     Narrative:       The MDRD GFR formula is only valid for adults with stable renal function between ages 18 and 70.    BNP [[id number]]  (Abnormal) Collected:  10/10/17 1726    Specimen:  Blood Updated:  10/10/17 1824     proBNP 1120.0 (C) pg/mL     Blood Culture - Blood, [[id number]] Collected:  10/10/17 1814    Specimen:  Blood from Arm, Right Updated:  10/10/17 1826    Blood Culture - Blood, [[id number]] Collected:  10/10/17 1819    Specimen:  Blood from Arm, Left Updated:  10/10/17 1827    Protime-INR [[id number]]  (Abnormal) Collected:  10/10/17 1726    Specimen:  Blood Updated:  10/10/17 1830     Protime 17.3 (H) Seconds      INR 1.58 (H)    aPTT [[id number]]  (Abnormal) Collected:  10/10/17 1726    Specimen:  Blood Updated:  10/10/17 1830     PTT 21.4 (L) seconds     Light Blue Top [[id number]] Collected:  10/10/17 1726     Specimen:  Blood Updated:  10/10/17 1831     Extra Tube hold for add-on      Auto resulted       Lavender Top [[id number]] Collected:  10/10/17 1726    Specimen:  Blood Updated:  10/10/17 1831     Extra Tube hold for add-on      Auto resulted       Gold Top - SST [[id number]] Collected:  10/10/17 1726    Specimen:  Blood Updated:  10/10/17 1831     Extra Tube Hold for add-ons.      Auto resulted.       Green Top (No Gel) [[id number]] Collected:  10/10/17 1726    Specimen:  Blood Updated:  10/10/17 1831     Extra Tube Hold for add-ons.      Auto resulted.       [place] Draw [[id number]] Collected:  10/10/17 1726    Specimen:  Blood Updated:  10/10/17 1831    Narrative:       The following orders were created for panel order [place] Draw.  Procedure                               Abnormality         Status                     ---------                               -----------         ------                     Light Blue Top[[id number]]                                   Final result               Lavender Top[[id number]]                                     Final result               Gold Top - SST[[id number]]                                   Final result               Green Top (No Gel)[[id number]]                               Final result                 Please view results for these tests on the individual orders.    CBC & Differential [[id number]] Collected:  10/10/17 1726    Specimen:  Blood Updated:  10/10/17 1839    Narrative:       The following orders were created for panel order CBC & Differential.  Procedure                               Abnormality         Status                     ---------                               -----------         ------                     CBC Auto Differential[[id number]]        Abnormal            Final result                 Please view results for these tests on the individual orders.    CBC Auto Differential [[id number]]  (Abnormal) Collected:  10/10/17 1726    Specimen:  Blood  Updated:  10/10/17 1839     WBC 11.98 (H) 10*3/mm3      RBC 4.37 (L) 10*6/mm3      Hemoglobin 12.3 (L) g/dL      Hematocrit 38.4 (L) %      MCV 87.9 fL      MCH 28.1 pg      MCHC 32.0 g/dL      RDW 16.1 (H) %      RDW-SD 50.2 fl      MPV 12.9 (H) fL      Platelets 102 (L) 10*3/mm3      Neutrophil % 91.0 (H) %      Lymphocyte % 1.9 (L) %      Monocyte % 6.5 %      Eosinophil % 0.0 %      Basophil % 0.1 %      Immature Grans % 0.5 %      Neutrophils, Absolute 10.90 (H) 10*3/mm3      Lymphocytes, Absolute 0.23 (L) 10*3/mm3      Monocytes, Absolute 0.78 10*3/mm3      Eosinophils, Absolute 0.00 10*3/mm3      Basophils, Absolute 0.01 10*3/mm3      Immature Grans, Absolute 0.06 10*3/mm3      nRBC 0.0 /100 WBC     Salicylate Level [[id number]]  (Abnormal) Collected:  10/10/17 1726    Specimen:  Blood Updated:  10/10/17 1851     Salicylate <1.0 (L) mg/dL     TSH [[id number]]  (Normal) Collected:  10/10/17 1726    Specimen:  Blood Updated:  10/10/17 1851     TSH 4.240 mIU/mL     Acetaminophen Level [[id number]]  (Normal) Collected:  10/10/17 1726    Specimen:  Blood Updated:  10/10/17 1851     Acetaminophen <10.0 mcg/mL     Narrative:       Toxic = Greater than 150 mcg/mL    Lactic Acid, Plasma [[id number]]  (Abnormal) Collected:  10/10/17 1905    Specimen:  Blood Updated:  10/10/17 1921     Lactate 2.6 (C) mmol/L     Lactic Acid, Plasma [[id number]] Collected:  10/10/17 1905    Specimen:  Blood Updated:  10/10/17 2231     Extra Tube Hold for add-ons.      Auto resulted.       Urinalysis With / Culture If Indicated - Urine, Catheter [[id number]]  (Abnormal) Collected:  10/10/17 3744    Specimen:  Urine from Urine, Catheter Updated:  10/10/17 7913     Color, UA Yellow     Appearance, UA Slightly Cloudy (A)     pH, UA <=5.0     Specific Gravity, UA 1.025     Glucose, UA Negative     Ketones, UA Negative     Bilirubin, UA Negative     Blood, UA Small (1+) (A)     Protein, UA 30 mg/dL (1+) (A)     Leuk Esterase, UA Negative      Nitrite, UA Negative     Urobilinogen, UA 0.2 E.U./dL    Urinalysis, Microscopic Only - Urine, Clean Catch [[id number]]  (Abnormal) Collected:  10/10/17 2314    Specimen:  Urine from Urine, Catheter Updated:  10/10/17 2343     RBC, UA 6-12 (A) /HPF      WBC, UA None Seen /HPF      Bacteria, UA None Seen /HPF      Squamous Epithelial Cells, UA None Seen /HPF      Hyaline Casts, UA 3-6 /LPF      Uric Acid Crystals, UA Moderate/2+ /HPF      Methodology Manual Light Microscopy    Lactic Acid, Reflex [[id number]]  (Abnormal) Collected:  10/10/17 2314    Specimen:  Blood Updated:  10/10/17 2357     Lactate 2.2 (C) mmol/L           Radiology:    Imaging Results (last 72 hours)     Procedure Component Value Units Date/Time    CT Head Without Contrast [[id number]] Collected:  10/10/17 1852     Updated:  10/10/17 1854    Narrative:       FINAL REPORT    TECHNIQUE:  Axial images through the head was performed without contrast.This study was performed with techniques to keep radiation doses as low as reasonably achievable, (ALARA). Individualized dose reduction techniques using automated exposure control or   adjustment of mA and/or kV according to the patient's size were employed.    CLINICAL HISTORY:  AMS    FINDINGS:  There is moderate generalized atrophy. No abnormal density is seen.  Ventricles are normal.  There is no hemorrhage.  No mass effect is seen.    Bone windows show no evidence of fracture.      Impression:       No acute findings    Authenticated by YADI Pollard MD on 10/10/2017 06:52:04 PM    XR Chest 2 View [[id number]] Resulted:  10/10/17 1905     Updated:  10/10/17 1905          Assessment:  Active Problems:    Elevated lactic acid level    Acute encephalopathy    Hypothermia    Cachexia    Sepsis, present on admission, unknown source or organism   With suspected pneumonia and infection, hypothermia, heart rate greater than 90, elevated lactic acid  Suspect pneumonia, not seen with dehydration, present on  admission   Acute hypoxia   Acute metabolic encephalopathy  Abnormal gait with fall at home  Failure to thrive with cachexia and malnutrition consider related to dementia with poor oral intake vs possible malignancy  Suspect chronic dementia  Sacral decubitus ulcer, present on admission  Hypoalbuminemia  Recent scalp laceration after fall at home  Bilateral ecchymoses knees, from crawling on knees at home with left knee abrasion  Very poor hygeine  Elevated BNP  Elevated Lactic Acid  Dehydration  Suspected COPD  Anemia likely of chronic disease  Staph epidermidis bacteremia last month  History of rib fracture    Plan:    Admit to med surg with telemetry. He has received 2 liter normal saline bolus. Continue to hydrate with D5 Normal saline at 100 cc x 1 day. Oxygen provided. Duonebs ordered. Tylenol as needed for pain or fever. His temp has improved with the current therapies. Blood cultures have been obtained and pending. Lactic acid has improved. Check CT Chest for mass vs pneumonia and CT Lspine with abnormal gait, incontinence of bowel and bladder though over a month now. With cachexia, it would not be surprising to find a malignancy. Unfortunately the patient does not have family or friends to assist in gaining further details.     Last month labs showed culture blood positive for staph epidermidis. The patient had signed out AMA and provided levaquin, which was listed as appropriate antibiotic therapy but it is not known at this time whether he filled or took the medication. If he did not, would consider 2D echo. Nursing may assist tomorrow, when pharmacy open, to get further details.     Anemia panel pending.No occult bleeding identified.     Wound care consult. . PT consult. Nutrition. Regular diet with added supplementation. Started multivitamin.     Expect patient will require  more than  2-3 days duration hospitalization for appropriate treatmen. Expect he will require nursing facility  placement on discharge. Patient has history of signing out AMA but since patient intermittently now confused, he would not be able to sign himself out. He would also be a danger to himself if home alone.     Medication risks and benefits were discussed in detail. Patient reported being satisfied with current treatment plan.    Zandra Everett,   10/11/17  2:52 AM

## 2017-10-11 NOTE — PROGRESS NOTES
Continued Stay Note   Reji     Patient Name: Master Hall  MRN: [id number]  Today's Date: 10/11/2017    Admit Date: 10/10/2017          Discharge Plan       10/11/17 1424    Case Management/Social Work Plan    Plan Patient now in ICU. Called [facility]. Report number is [id number]              Discharge Codes     None            Tiffany Elizabeth

## 2017-10-11 NOTE — NURSING NOTE
Patient's oxygen level dropped to 78% on 2 liters.  Encouraged patient to deep breathe on 6 Liters.  Called respiratory to see patient and they placed him on non rebreather mask.  Oxygen level went up to 91%.  Dr. Darnell was called and came to the room to see patient.  ABG was ordered.  Transferred patient to ICU bed 6 once report was given to ALONSO Coleman

## 2017-10-11 NOTE — PLAN OF CARE
Problem: Patient Care Overview (Adult)  Goal: Plan of Care Review  Outcome: Ongoing (interventions implemented as appropriate)    10/11/17 1944   Coping/Psychosocial Response Interventions   Plan Of Care Reviewed With patient   Patient Care Overview   Progress no change         Problem: Nutrition, Imbalanced: Inadequate Oral Intake (Adult)  Goal: Identify Related Risk Factors and Signs and Symptoms  Outcome: Ongoing (interventions implemented as appropriate)  Goal: Improved Oral Intake  Outcome: Ongoing (interventions implemented as appropriate)  Goal: Prevent Further Weight Loss  Outcome: Ongoing (interventions implemented as appropriate)    Problem: Fall Risk (Adult)  Goal: Identify Related Risk Factors and Signs and Symptoms  Outcome: Ongoing (interventions implemented as appropriate)  Goal: Absence of Falls  Outcome: Ongoing (interventions implemented as appropriate)

## 2017-10-11 NOTE — PROGRESS NOTES
Malnutrition Severity Assessment    Patient Name:  Master Hall  YOB: 1940  MRN: [id number]  Admit Date:  10/10/2017    Patient meets criteria for : Severe malnutrition    Comments:  Rec. #1: Consider nutrition support. Rec. #2: Consider changing p.o. Diet to High Calorie, High Protein. RD added nutritional supplements: Magic Cup BID, Ensure Pudding and Basil both BID. Pt. Exhibiting good p.o. Intake ~70% of meals on average per NSG. RD observed pt. Eating well. Rec. #3: Continue with MVi with minerals daily and consider adding Vitamin C 500 mg BID to promote healing. Consult RD PRN.      Malnutrition Type: Chronic Illness Malnutrition     Malnutrition Type (last 8 hours)      Malnutrition Severity Assessment       10/11/17 1148    Malnutrition Severity Assessment    Malnutrition Type Chronic Illness Malnutrition      10/11/17 1148    Physical Signs of Malnutrition (Chronic)    Muscle Wasting Severe    Fat Loss Severe    Fluid Accumulation None    Secondary Physical Signs None      10/11/17 1148    Weight Status (Chronic)    BMI Severe (<16)    %IBW Severe (<70%)      10/11/17 1148    Energy Intake Status (Chronic)    Energy Intake Severe (< or equal to 50% / > or equal to 1 mo)      10/11/17 1148    Criteria Met (Must meet criteria for severity in at least 2 of these categories: M Wasting, Fat Loss, Fluid, Secondary Signs, Wt. Status, Intake)    Patient meets criteria for  Severe malnutrition          Electronically signed by:  Deya Herzog RD  10/11/17 11:49 AM

## 2017-10-11 NOTE — CONSULTS
Date of consultation:   October 11, 2017    Requested by:   Hospitalist Service.     PCP: No Known Provider    Reason:  Acute hypoxic respiratory failure    History of Present Illness:  76 y.o. male  who was admitted to the hospitalist service with altered mental status.  The patient apparently lives alone and was found covered in his own feces and urine and was brought in with a core temperature of only 96.  The patient is confused and does not answer questions appropriately although did recognize his name and also said yes to the question about if he was in the hospital.     The patient is extremely sleepy but arousable.  He was noted to have hypoxia and had copious secretions and was sent to ICU for concern about possible worsening and potential need to escalate therapy and possible intubation, especially if secretions worsen.    Currently the patient is on 60% FiO2 via face mask and his oxygen saturation 94-97%.  As per the nursing staff, he's had significant cough and copious secretions were suctioned from his mouth as well.    Review of System: Could not be obtained, as the patient is somewhat confused.     Past Medical History:  History reviewed. No pertinent past medical history.      Past Surgical History:  History reviewed. No pertinent surgical history.      Family History:  History reviewed. No pertinent family history.      Social History:  Social History     Social History   • Marital status: Unknown     Spouse name: N/A   • Number of children: N/A   • Years of education: N/A     Social History Main Topics   • Smoking status: Former Smoker   • Smokeless tobacco: Never Used   • Alcohol use No   • Drug use: No   • Sexual activity: Not Asked     Other Topics Concern   • None     Social History Narrative    . Lives alone. Neighbor called EMS because patient hadn't been seen in a few days. Patient has declined since his wife had passed away.     Patient reports history of smoking. Denies current  "smoking, alcohol, or drugs. He reported prior recent past was driving still and had been able to get groceries until \"I ran out of gas\".         Physical Exam:  /83  Pulse 98  Temp 96.3 °F (35.7 °C) (Rectal)   Resp 18  Ht 74\" (188 cm)  Wt 93 lb 7.7 oz (42.4 kg)  SpO2 96%  BMI 12 kg/m2    Constitutional:            Vital signs reviewed                     Bitemporal wasting noted.             General: No acute distress noted. Able to communicate appropriately    Head/Face/Eyes:            No significant facial abnormalities seen.            Extra ocular movement was intact.            Eyes were closed.     ENT:             Oropharynx was dry. No lesions noted.     Neck:             Supple. No JVD noted.              Thyroid gland did not  seem to be enlarged    Cardiovascular:              S1 + S2. Regular     Respiratory:            Respiratory effort was mildly labored            Good Air Entry Bilaterally with minimal basal crackles heard.    Abdomen:            Scaphoid.             Bowel sounds sluggishly positive            No obvious organomegaly noted     Musculoskeletal/Extremities:             Gait could not be assessed at this time.             No clubbing, cyanosis noted in the upper extremities.             No edema noted in the lower extremities bilaterally.    Neurologic/Psychiatric:             Awoke to loud verbal stimulus.             Opened eyes to command.    Skin:             No rash noted.             Warm and dry    Labs:   Reviewed. Pertinent labs were noted.     Lab Results   Component Value Date    WBC 13.24 (H) 10/11/2017    HGB 12.4 (L) 10/11/2017    HCT 38.0 (L) 10/11/2017    MCV 88.6 10/11/2017     (L) 10/11/2017       Lab Results   Component Value Date    GLUCOSE 195 (H) 10/11/2017    CALCIUM 9.1 10/11/2017     10/11/2017    K 4.1 10/11/2017    CO2 25.0 (L) 10/11/2017     10/11/2017    BUN 50 (H) 10/11/2017    CREATININE 1.00 10/11/2017    EGFRIFNONA 73 " 10/11/2017    BCR 50.0 (H) 10/11/2017    ANIONGAP 16.1 10/11/2017         ABG:  Lab Results   Component Value Date    PHART 7.430 10/11/2017    ZVB7XZF 32.8 (L) 10/11/2017    PO2ART 63.6 (L) 10/11/2017    HGBBG 12.2 10/11/2017    C7AJGVEL 91.0 10/11/2017    CARBOXYHGB 1.0 10/11/2017           Imaging Study: Images reviewed personally     Imaging Results (last 72 hours)     Procedure Component Value Units Date/Time    CT Head Without Contrast [[id number]] Collected:  10/10/17 1852     Updated:  10/10/17 1854    Narrative:       FINAL REPORT    TECHNIQUE:  Axial images through the head was performed without contrast.This study was performed with techniques to keep radiation doses as low as reasonably achievable, (ALARA). Individualized dose reduction techniques using automated exposure control or   adjustment of mA and/or kV according to the patient's size were employed.    CLINICAL HISTORY:  AMS    FINDINGS:  There is moderate generalized atrophy. No abnormal density is seen.  Ventricles are normal.  There is no hemorrhage.  No mass effect is seen.    Bone windows show no evidence of fracture.      Impression:       No acute findings    Authenticated by YADI Pollard MD on 10/10/2017 06:52:04 PM    XR Chest 2 View [[id number]] Collected:  10/11/17 0733     Updated:  10/11/17 0736    Narrative:       PROCEDURE: XR CHEST 2 VW-        HISTORY: ams, hypothermia     COMPARISON: September 23, 2017.     FINDINGS: The heart is normal in size. The mediastinum is unremarkable.  There are emphysematous changes to the lungs. Patchy basilar opacities  are similar to the prior exam. There is no pneumothorax. There are no  acute osseous abnormalities.           Impression:       Emphysematous changes with patchy basilar opacities which  may reflect atelectasis or pneumonia.           This report was finalized on 10/11/2017 7:34 AM by Yohan Lambert M.D..    CT Chest Without Contrast [[id number]] Collected:  10/11/17 0748      Updated:  10/11/17 0737    Narrative:       PROCEDURE: CT CHEST WO CONTRAST-     HISTORY: cough, shortness of breath, hypoxia, sepsis, altered mental  status; G93.40-Encephalopathy, unspecified; R79.89-Other specified  abnormal findings of blood chemistry; T92-Uzxrkocv     COMPARISON:  None .     PROCEDURE:  Multiple axial CT images were obtained from the thoracic  inlet through the upper abdomen without the use of contrast.      FINDINGS:   Soft tissue windows reveal no axillary, hilar or mediastinal adenopathy.  Heart size is normal. The aorta is normal in caliber. There are no  pleural or pericardial effusions. Lung windows reveal patchy opacities  in the bilateral upper and lower lobes consistent with a pneumonia. The  visualized upper abdomen is unremarkable. Bone windows reveal no acute  osseous abnormalities.       Impression:       Diffuse patchy bilateral airspace disease consistent with a  pneumonia.     787.88 mGy.cm          This study was performed with techniques to keep radiation doses as low  as reasonably achievable (ALARA). Individualized dose reduction  techniques using automated exposure control or adjustment of mA and/or  kV according to the patient size were employed.      This report was finalized on 10/11/2017 7:35 AM by Yohan Lambert M.D..    CT Lumbar Spine Without Contrast [[id number]] Collected:  10/11/17 0735     Updated:  10/11/17 0738    Narrative:       PROCEDURE: CT LUMBAR SPINE WO CONTRAST-     HISTORY: abnormal gait, incontinence of bowel and bladder;  G93.40-Encephalopathy, unspecified; R79.89-Other specified abnormal  findings of blood chemistry; G49-Fsraebvj     TECHNIQUE: Multiple axial CT images were obtained through the lumbar  spine using bone window algorithms. Coronal and sagittal images were  reconstructed from the original axial data set.      FINDINGS: The lumbar spine is well aligned with no acute fractures.  There are broad-based disc bulges from the L2 level  distally producing  mild-to-moderate central stenosis at multiple levels. The paraspinal  soft tissues are unremarkable.            Impression:       Degenerative change with no acute osseous abnormalities.                              This study was performed with techniques to keep radiation doses as low  as reasonably achievable (ALARA). Individualized dose reduction  techniques using automated exposure control or adjustment of mA and/or  kV according to the patient size were employed.         This report was finalized on 10/11/2017 7:36 AM by Yohan Lambert M.D..    XR Chest 1 View [[id number]] Collected:  10/11/17 1157     Updated:  10/11/17 1205    Narrative:       PROCEDURE: XR CHEST 1 VW-     HISTORY: hypoxia; G93.40-Encephalopathy, unspecified; R79.89-Other  specified abnormal findings of blood chemistry; L64-Mprpbvbi     COMPARISON: October 10, 2017.     FINDINGS: The heart is normal in size. The mediastinum is unremarkable.  There is respiratory motion artifact. Patchy bilateral opacities  predominantly involving the lung bases are unchanged. There is no  pneumothorax.  There are no acute osseous abnormalities.           Impression:       No significant change in the patients bilateral airspace  disease.     Continued followup is recommended.     This report was finalized on 10/11/2017 11:58 AM by Yohan Lambert M.D..              Assessment:  1.  Acute hypoxic respiratory failure  2.  Likely pneumonia  3.  Hypothermia  4.  Severe malnutrition    Discussion/Recommendations:   I have reviewed the images personally and there definitely seems to be some evidence suggest pneumonia, more so on the right side.  Both sides however, seem to be involved.    I agree with empiric antibiotics for now.    The patient has clearly improved after he was able to clear his secretions and moving forward, this will be a major concern especially due to significant malnutrition.  Although BiPAP may be difficult to  attempt in this patient, it can definitely be attempted before proceeding to intubation.    If necessary for hypoxia, I would suggest trying 12/8 or 12/6.    I would suggest chest x-ray in the next 1-2 days.    I also agree with nutrition consultation.    I have also discussed the case with the nursing staff.    Recommendations were also discussed with the referring provider.     I would like to thank you for the opportunity to participate in the care of this patient.  We will communicate changes and recommendations, if and when necessary.      Rocael Calle MD  10/11/17  12:21 PM    Dictated utilizing Dragon dictation.

## 2017-10-11 NOTE — PROGRESS NOTES
"Adult Nutrition  Assessment/PES    Patient Name:  Master Hall  YOB: 1940  MRN: [id number]  Admit Date:  10/10/2017    Assessment Date:  10/11/2017    Comments:   Rec. #1: Consider nutrition support. Rec. #2: Consider changing p.o. Diet to High Calorie, High Protein. RD added nutritional supplements: Magic Cup BID, Ensure Pudding and Basil both BID. Pt. Exhibiting good p.o. Intake ~70% of meals on average per NSG. RD observed pt. Eating well. Rec. #3: Continue with MVI with minerals daily and consider adding Vitamin C 500 mg BID to promote healing. Consult RD PRN.            Reason for Assessment       10/11/17 1132    Reason for Assessment    Reason For Assessment/Visit admission assessment;physician consult;nurse/nurse practitioner consult;identified at risk by screening criteria    Identified At Risk By Screening Criteria diagnosis;BMI;MST SCORE 2+;reduced oral intake over the last month    Diagnosis Diagnosis    Nutrition related Increased nutrition needs    Hematological Anemia   Anemia of chronic Dz.    Infectious Disease Sepsis    Neurological Metabolic encephalopathy;Encephalopathy;Dementia    Pulmonary/Critical Care Other (comment)   suspected PNA and suspected COPD    Skin Pressure ulcer;Non healing wound    Other diagnosis Hypothermia, cachexia, malnutrition, poor oral intake vs. possible malignancy noted    Factors Affecting Nutrition Factors    Appetite Good                Anthropometrics       10/11/17 1141    Anthropometrics    Height Method Stated    Height 188 cm (74\")    Weight Method Bed scale    Weight 42.4 kg (93 lb 7.7 oz)    Ideal Body Weight (IBW)    Ideal Body Weight (IBW), Male (kg) 87.66    % Ideal Body Weight 48.47    Body Mass Index (BMI)    BMI (kg/m2) 12.03    BMI Grade less than 16 low grade III            Labs/Tests/Procedures/Meds       10/11/17 1143    Labs/Tests/Procedures/Meds    Labs/Tests Review Reviewed;Glucose;BUN   High: Glucose, BUN    Medication Review " "Reviewed, pertinent;Antibiotic;Multivitamin            Physical Findings       10/11/17 1143    Physical Findings/Assessment    Additional Documentation Physical Appearance (Group)    Physical Appearance    Overall Physical Appearance underweight;cachetic    Skin pressure ulcer(s);non-healing wound(s)            Estimated/Assessed Needs       10/11/17 1144    Calculation Measurements    Weight Used For Calculations 87.7 kg (193 lb 4.1 oz)    Height Used for Calculations 1.88 m (6' 2\")    Estimated/Assessed Energy Needs    Energy Need Method [employer]    Age 76    [facility] ([facility]) 1676.35    Activity Factors ([employer])  Out of bed, ambulatory  1.3    Estimated Kcal Range  ~8641-0478    Estimated/Assessed Protein Needs    Weight Used for Protein Calculation 87.7 kg (193 lb 4.1 oz)    Protein (gm/kg) 1.5    1.5 Gm Protein (gm) 131.49    Estimated Protein Range ~105..49    Estimated/Assessed Fluid Needs    Fluid Need Method RDA method    RDA Method (mL)  2500            Nutrition Prescription Ordered       10/11/17 1145    Nutrition Prescription PO    Current PO Diet Regular            Evaluation of Received Nutrient/Fluid Intake       10/11/17 1145    PO Evaluation    Number of Days PO Intake Evaluated 1 day    Number of Meals 1    % PO Intake 70              Malnutrition Severity Assessment       10/11/17 1148    Malnutrition Severity Assessment    Malnutrition Type Chronic Illness Malnutrition    Physical Signs of Malnutrition (Chronic)    Muscle Wasting Severe    Fat Loss Severe    Fluid Accumulation None    Secondary Physical Signs None    Weight Status (Chronic)    BMI Severe (<16)    %IBW Severe (<70%)    Energy Intake Status (Chronic)    Energy Intake Severe (< or equal to 50% / > or equal to 1 mo)    Criteria Met (Must meet criteria for severity in at least 2 of these categories: M Wasting, Fat Loss, Fluid, Secondary Signs, Wt. Status, Intake)    Patient meets criteria for  " Severe malnutrition        Problem/Interventions:        Problem 1       10/11/17 1152    Nutrition Diagnoses Problem 1    Problem 1 Underweight    Etiology (related to) Medical Diagnosis    Pulmonary/Critical Care Other (comment)   possible PNA, possible COPD    Signs/Symptoms (evidenced by) BMI    BMI Less than 16            Problem 2       10/11/17 1153    Nutrition Diagnoses Problem 2    Problem 2 Increased Nutrient Needs    Macronutrient Kcal;Fluid;Fiber;Protein;Carbohydrate;Fat    Micronutrient Vitamin;Mineral    Etiology (related to) Medical Diagnosis    Infectious Disease Sepsis    Pulmonary/Critical Care Other (comment)   Possible PNA, COPD    Skin Pressure ulcer    Signs/Symptoms (evidenced by) Other (comment)   pulmonary dysfunction, wound healing                  Intervention Goal       10/11/17 1154    Intervention Goal    General Meet nutritional needs for age/condition    PO Meet estimated needs    TF/PN Inititiate TF/PN    Weight No significant weight loss            Nutrition Intervention       10/11/17 1154    Nutrition Intervention    RD/Tech Action Recommend/ordered;Supplement provided;Follow Tx progress;Encourage intake    Recommended/Ordered Supplement;EN            Nutrition Prescription       10/11/17 1156    Nutrition Prescription PO    PO Prescription Begin/change diet;Begin/change supplement    Supplement Magic Cup;Basil;Ensure Pudding    Supplement Frequency 2 times a day    Other Modifiers High protein/high calorie    New PO Prescription Ordered? No, recommended   supplements ordered    Nutrition Prescription EN    Enteral Prescription --   Consider nutrition support secondary to pt. with wounds and low BMI  meeting criteria for severe malnutrition    Other Orders    Obtain Weight Daily    Obtain Weight Ordered? No, recommended    Supplement --   Continue MVI with minerals consider adding Vitamin C 500 mg BID to promote healing            Education/Evaluation       10/11/17 1152     Education    Education Education not appropriate at this time   RD observed pt. eating well and spoke to pt. who did not acknowledge RD was there.    Please explain Patient confusion    Monitor/Evaluation    Monitor Per protocol;PO intake;Supplement intake;Pertinent labs;Weight        Electronically signed by:  Deya Herzog RD  10/11/17 11:58 AM

## 2017-10-11 NOTE — PLAN OF CARE
Problem: Patient Care Overview (Adult)  Goal: Plan of Care Review    10/11/17 0502   Coping/Psychosocial Response Interventions   Plan Of Care Reviewed With patient   Patient Care Overview   Progress no change   Outcome Evaluation   Outcome Summary/Follow up Plan Patient admitted for increased lactic acid. Patient started on fluids. Patient has a hx of recent fall. Patient is a poor historian with signs of malntutrition. When patient arrived to medical unit he drank an ensure and ate half a turkey sandwich. Patient tolerated well.        Goal: Discharge Needs Assessment    10/11/17 0502   Discharge Needs Assessment   Concerns To Be Addressed basic needs concerns;discharge planning concerns   Readmission Within The Last 30 Days no previous admission in last 30 days   Discharge Facility/Level Of Care Needs assisted living facility   Current Discharge Risk cognitively impaired   Discharge Disposition still a patient   Discharge Planning Comments Patient needs assistance with discharge planning especially if confusion does not resolve.    Current Health   Outpatient/Agency/Support Group Needs assisted living facility (specify)   Anticipated Changes Related to Illness inability to care for self   Self-Care   Equipment Currently Used at Home none   Living Environment   Transportation Available car         Problem: Nutrition, Imbalanced: Inadequate Oral Intake (Adult)  Goal: Identify Related Risk Factors and Signs and Symptoms  Outcome: Ongoing (interventions implemented as appropriate)    10/11/17 0502   Nutrition, Imbalanced: Inadequate Oral Intake   Nutrition Imbalanced: Less than Body Requirements: Related Risk Factors resources inadequate   Signs and Symptoms (Nutrition Imbalance, Inadequate Oral Intake: Signs and Symptoms) loss of subcutaneous fat;weakness/lethargy       Goal: Improved Oral Intake  Outcome: Ongoing (interventions implemented as appropriate)    10/11/17 0502   Nutrition, Imbalanced: Inadequate Oral  Intake (Adult)   Improved Oral Intake making progress toward outcome       Goal: Prevent Further Weight Loss  Outcome: Ongoing (interventions implemented as appropriate)    Problem: Fall Risk (Adult)  Goal: Identify Related Risk Factors and Signs and Symptoms  Outcome: Ongoing (interventions implemented as appropriate)    10/11/17 0502   Fall Risk   Fall Risk: Related Risk Factors confusion/agitation;history of falls   Fall Risk: Signs and Symptoms presence of risk factors       Goal: Absence of Falls  Outcome: Ongoing (interventions implemented as appropriate)    10/11/17 0502   Fall Risk (Adult)   Absence of Falls achieves outcome

## 2017-10-12 NOTE — PLAN OF CARE
Problem: Patient Care Overview (Adult)  Goal: Plan of Care Review  Outcome: Ongoing (interventions implemented as appropriate)    10/11/17 2101   Coping/Psychosocial Response Interventions   Plan Of Care Reviewed With patient   Patient Care Overview   Progress no change       Goal: Adult Individualization and Mutuality  Outcome: Ongoing (interventions implemented as appropriate)  Goal: Discharge Needs Assessment  Outcome: Ongoing (interventions implemented as appropriate)    Problem: Nutrition, Imbalanced: Inadequate Oral Intake (Adult)  Goal: Identify Related Risk Factors and Signs and Symptoms  Outcome: Ongoing (interventions implemented as appropriate)  Goal: Improved Oral Intake  Outcome: Ongoing (interventions implemented as appropriate)  Goal: Prevent Further Weight Loss  Outcome: Ongoing (interventions implemented as appropriate)    Problem: Fall Risk (Adult)  Goal: Identify Related Risk Factors and Signs and Symptoms  Outcome: Ongoing (interventions implemented as appropriate)  Goal: Absence of Falls  Outcome: Ongoing (interventions implemented as appropriate)

## 2017-10-12 NOTE — PLAN OF CARE
"Problem: Patient Care Overview (Adult)  Goal: Plan of Care Review  Outcome: Ongoing (interventions implemented as appropriate)    10/12/17 1254   Coping/Psychosocial Response Interventions   Plan Of Care Reviewed With patient   Patient Care Overview   Progress no change   Outcome Evaluation   Outcome Summary/Follow up Plan OT eval completed. Patient was disoriented x 4 and could not provide any history. Stated \"I don't know who I am or where I\"m at.\" Patient required max verbal cues to follow one steps commands and sequence opening/closing mouth to eat thee bites of pudding. Patient presents deficits in strength, endurance, functional activity tolerance, balance, mobility, coordination and ADL performance. Patient is expected to benefit from OT services to improve overall functional performance and mobility prior to DC.          Problem: Inpatient Occupational Therapy  Goal: Transfer Training Goal 2 LTG- OT  Outcome: Ongoing (interventions implemented as appropriate)    10/12/17 1254   Transfer Training 2 OT LTG   Transfer Training 2 OT LTG, Date Established 10/12/17   Transfer Training 2 OT LTG, Time to Achieve 2 wks   Transfer Training 2 OT LTG, Activity Type sit to stand/stand to sit;toilet   Transfer Training 2 OT LTG, [place] Level minimum assist (75% patient effort)   Transfer Training 2 OT LTG, Assist Device walker, rolling   Transfer Training 2 OT LTG, Outcome goal ongoing       Goal: Strength Goal LTG- OT  Outcome: Ongoing (interventions implemented as appropriate)    10/12/17 1254   Strength OT LTG   Strength Goal OT LTG, Date Established 10/12/17   Strength Goal OT LTG, Time to Achieve 2 wks   Strength Goal OT LTG, Measure to Achieve Patient will perform 15 reps UB ther ex in order to increase strength and endurance.    Strength Goal OT LTG, Outcome goal ongoing       Goal: Static Standing Balance Goal LTG- OT  Outcome: Ongoing (interventions implemented as appropriate)    10/12/17 1254   Static " Standing Balance OT LTG   Static Standing Balance OT LTG, Date Established 10/12/17   Static Standing Balance OT LTG, Time to Achieve 2 wks   Static Standing Balance OT LTG, [place] Level minimum assist (75% patient effort)   Static Standing Balance OT LTG, Assist Device assistive device   Static Standing Balance OT LTG, Outcome goal ongoing       Goal: Grooming Goal LTG- OT  Outcome: Ongoing (interventions implemented as appropriate)    10/12/17 1254   Grooming OT LTG   Grooming Goal OT LTG, Date Established 10/12/17   Grooming Goal OT LTG, Time to Achieve 2 wks   Grooming Goal OT LTG, [place] Level minimum assist (75% patient effort)   Grooming Goal OT LTG, Position sitting, edge of bed   Grooming Goal OT LTG, Outcome goal ongoing       Goal: Toileting Goal LTG- OT  Outcome: Ongoing (interventions implemented as appropriate)    10/12/17 1254   Toileting OT LTG   Toileting Goal OT LTG, Date Established 10/12/17   Toileting Goal OT LTG, Time to Achieve 2 wks   Toileting Goal OT LTG, [place] Level minimum assist (75% patient effort);2 person assist required   Toileting Goal OT LTG, Additional Goal BSC   Toileting Goal OT LTG, Outcome goal ongoing       Goal: Functional Mobility Goal LTG- OT  Outcome: Ongoing (interventions implemented as appropriate)    10/12/17 1254   Functional Mobility OT LTG   Functional Mobility Goal OT LTG, Date Established 10/12/17   Functional Mobility Goal OT LTG, Time to Achieve 2 wks   Functional Mobility Goal OT LTG, [place] Level min assist;x2   Functional Mobility Goal OT LTG, Assist Device rolling walker   Functional Mobility Goal OT LTG, Distance to Achieve to the door   Functional Mobility Goal OT LTG, Outcome goal ongoing

## 2017-10-12 NOTE — PLAN OF CARE
Problem: Patient Care Overview (Adult)  Goal: Plan of Care Review  Outcome: Ongoing (interventions implemented as appropriate)    10/12/17 0920   Coping/Psychosocial Response Interventions   Plan Of Care Reviewed With patient   Outcome Evaluation   Outcome Summary/Follow up Plan PT jarvis completed. Patient reluctant to advance activity this date d/t reports of fatigue. He presents with decreased strength, balance, endurance, and independence with mobility but is expected to benefit from continued skilled PT intervention to improve his overall functional mobility status prior to D/C.         Problem: Inpatient Physical Therapy  Goal: Bed Mobility Goal LTG- PT  Outcome: Ongoing (interventions implemented as appropriate)    10/12/17 0920   Bed Mobility PT LTG   Bed Mobility PT LTG, Date Established 10/12/17   Bed Mobility PT LTG, Time to Achieve 2 wks   Bed Mobility PT LTG, Activity Type supine to sit/sit to supine   Bed Mobility PT LTG, [place] Level conditional independence   Bed Mobility PT Goal LTG, Assist Device bed rails   Bed Mobility PT LTG, Outcome goal ongoing       Goal: Transfer Training Goal 1 LTG- PT  Outcome: Ongoing (interventions implemented as appropriate)    10/12/17 0920   Transfer Training PT LTG   Transfer Training PT LTG, Date Established 10/12/17   Transfer Training PT LTG, Time to Achieve 2 wks   Transfer Training PT LTG, Activity Type sit to stand/stand to sit;bed to chair /chair to bed   Transfer Training PT LTG, [place] Level contact guard assist   Transfer Training PT LTG, Assist Device walker, rolling   Transfer Training PT LTG, Outcome goal ongoing       Goal: Gait Training Goal LTG- PT  Outcome: Ongoing (interventions implemented as appropriate)    10/12/17 0920   Gait Training PT LTG   Gait Training Goal PT LTG, Date Established 10/12/17   Gait Training Goal PT LTG, Time to Achieve 2 wks   Gait Training Goal PT LTG, [place] Level contact guard assist   Gait Training Goal  PT LTG, Assist Device walker, rolling   Gait Training Goal PT LTG, Distance to Achieve 100   Gait Training Goal PT LTG, Outcome goal ongoing       Goal: Strength Goal LTG- PT  Outcome: Ongoing (interventions implemented as appropriate)    10/12/17 0920   Strength Goal PT LTG   Strength Goal PT LTG, Date Established 10/12/17   Strength Goal PT LTG, Time to Achieve 2 wks   Strength Goal PT LTG, Measure to Achieve Patient will perform B LE ther ex x 15 reps to improve functional strength for mobility.   Strength Goal PT LTG, Outcome goal ongoing

## 2017-10-12 NOTE — PROGRESS NOTES
Pt still presents with questionable mentation today. Will continue to follow for dcp.    NESSA Reyes, LINDAW  10/12/17   2:55 PM

## 2017-10-12 NOTE — THERAPY EVALUATION
Acute Care - Physical Therapy Initial Evaluation   [facility]     Patient Name: Master Hall  : 1940  MRN: [id number]  Today's Date: 10/12/2017   Onset of Illness/Injury or Date of Surgery Date: 10/10/17  Date of Referral to PT: 10/11/17  Referring Physician: Marguerite      Admit Date: 10/10/2017     Visit Dx:    ICD-10-CM ICD-9-CM   1. Acute encephalopathy G93.40 348.30   2. Elevated lactic acid level R79.89 276.2   3. Cachexia R64 799.4   4. Impaired functional mobility, balance, gait, and endurance Z74.09 V49.89     Patient Active Problem List   Diagnosis   • Sepsis, unspecified organism   • Acute metabolic encephalopathy   • Hypothermia   • Cachexia   • Pneumonia     History reviewed. No pertinent past medical history.  History reviewed. No pertinent surgical history.       PT ASSESSMENT (last 72 hours)      PT Evaluation       10/12/17 0900 10/11/17 1800    Rehab Evaluation    Document Type evaluation  -LM     Subjective Information agree to therapy;complains of;weakness;fatigue  -LM     Patient Effort, Rehab Treatment fair  -LM     Symptoms Noted During/After Treatment fatigue  -LM     General Information    Patient Profile Review yes  -LM     Onset of Illness/Injury or Date of Surgery Date 10/10/17  -LM     Referring Physician Marguerite  -LM     General Observations Pt received supine in bed. O2 per n/c. Pt keeps eyes closed most of the time.  -LM     Pertinent History Of Current Problem Acute encephalopathy;syncope,pneumonia  -LM     Precautions/Limitations fall precautions;oxygen therapy device and L/min  -LM     Prior Level of Function independent:;all household mobility  -LM     Equipment Currently Used at Home --   Unknown-pt unable to state.  -LM     Plans/Goals Discussed With patient;agreed upon  -LM     Risks Reviewed patient:;LOB;increased discomfort  -LM     Benefits Reviewed patient:;improve function;increase independence  -LM     Barriers to Rehab previous functional deficit;ineffective  coping  -LM     Living Environment    Lives With alone  -LM     Living Arrangements house  -LM     Home Accessibility stairs to enter home;bed and bath on same level  -LM     Number of Stairs to Enter Home 3  -LM     Living Environment Comment Pt found at home covered in feces.  -LM     Clinical Impression    Date of Referral to PT 10/11/17  -LM     PT Diagnosis Generalized weakness  -LM     Patient/Family Goals Statement None stated.  -LM     Criteria for Skilled Therapeutic Interventions Met yes;treatment indicated  -LM     Rehab Potential good, to achieve stated therapy goals  -LM     Pain Assessment    Pain Assessment No/denies pain  -LM     Cognitive Assessment/Intervention    Current Cognitive/Communication Assessment impaired  -LM     Orientation Status oriented to;person;place;situation;required verbal cueing (specifiy in comments)  -LM     Follows Commands/Answers Questions able to follow single-step instructions;needs cueing;needs increased time;needs repetition  -LM     Personal Safety decreased awareness, need for assist;decreased awareness, need for safety  -LM     Personal Safety Interventions fall prevention program maintained;gait belt;nonskid shoes/slippers when out of bed  -LM     ROM (Range of Motion)    General ROM no range of motion deficits identified  -LM     MMT (Manual Muscle Testing)    General MMT Assessment upper extremity strength deficits identified;lower extremity strength deficits identified  -LM     General MMT Assessment Detail Grossly 4-/5.  -LM     Muscle Tone Assessment    Muscle Tone Assessment  Bilateral Upper Extremities;Bilateral Lower Extremities  -KT    Bilateral Upper Extremities Muscle Tone Assessment  moderately decreased tone  -KT    Bilateral Lower Extremities Muscle Tone Assessment  moderately decreased tone  -KT    Bed Mobility, Assessment/Treatment    Bed Mobility, Assistive Device bed rails;head of bed elevated  -LM     Bed Mob, Supine to Sit, [place] moderate  assist (50% patient effort)  -LM     Bed Mob, Sit to Supine, [place] moderate assist (50% patient effort)  -LM     Bed Mobility, Safety Issues decreased use of arms for pushing/pulling;decreased use of legs for bridging/pushing  -LM     Bed Mobility, Impairments strength decreased;impaired balance  -LM     Transfer Assessment/Treatment    Transfer, Comment Pt declined.  -LM     Gait Assessment/Treatment    Gait, Comment Pt declined.  -LM     Positioning and Restraints    Pre-Treatment Position in bed  -LM     Post Treatment Position bed  -LM     In Bed supine;call light within reach;encouraged to call for assist  -LM       10/11/17 1600 10/11/17 1400    Muscle Tone Assessment    Muscle Tone Assessment Bilateral Upper Extremities;Bilateral Lower Extremities  -KT Bilateral Upper Extremities;Bilateral Lower Extremities  -KT    Bilateral Upper Extremities Muscle Tone Assessment moderately decreased tone  -KT moderately decreased tone  -KT    Bilateral Lower Extremities Muscle Tone Assessment moderately decreased tone  -KT moderately decreased tone  -KT      10/11/17 1200 10/11/17 1159    Rehab Evaluation    Evaluation Not Performed  unable to evaluate, medical status change   Pt being tranferred to ICU d/t decline in medical condition.  Hold PT eval.  -LM    Muscle Tone Assessment    Muscle Tone Assessment Bilateral Upper Extremities;Bilateral Lower Extremities  -KT     Bilateral Upper Extremities Muscle Tone Assessment moderately decreased tone  -KT     Bilateral Lower Extremities Muscle Tone Assessment moderately decreased tone  -KT       10/11/17 0800 10/11/17 0502    General Information    Equipment Currently Used at Home  none  -    Living Environment    Transportation Available  car  -    Muscle Tone Assessment    LUE Muscle Tone Assessment mildly decreased tone  -NC     RUE Muscle Tone Assessment mildly decreased tone  -NC     LLE Muscle Tone Assessment mildly decreased tone  -NC     RLE Muscle Tone  Assessment mildly decreased tone  -NC       10/11/17 0300 10/11/17 0152    Living Environment    Lives With  alone  -    Living Arrangements  house  -    Home Accessibility  stairs to enter home;stairs within home  -    Number of Stairs to Enter Home  --   SANJAY  -    Number of Stairs Within Home  --   SANJAY  -    Stair Railings at Home  other (see comments)   SANJAY  -    Type of Financial/Environmental Concern  none  -    Transportation Available  car  -    Living Environment Comment  Unable to fully assess living conditions, pt is minimally verbal and confused to place  -    Muscle Tone Assessment    Muscle Tone Assessment LUE;RUE;LLE;RLE  -     LUE Muscle Tone Assessment mildly decreased tone  -     RUE Muscle Tone Assessment mildly decreased tone  -     LLE Muscle Tone Assessment severely decreased tone  -     RLE Muscle Tone Assessment severely decreased tone  -       10/11/17 0141       General Information    Equipment Currently Used at Home none  -       User Key  (r) = Recorded By, (t) = Taken By, (c) = Cosigned By    Initials Name Provider Type    KT Tessa Ellison, RN Registered Nurse    [place] Zoila Martell, RN Registered Nurse     Citlalli Angela, PT Physical Therapist     Santa Hooper RN Registered Nurse          Physical Therapy Education     Title: PT OT SLP Therapies (Done)     Topic: Physical Therapy (Done)     Point: Mobility training (Done)    Learning Progress Summary    Learner Readiness Method Response Comment Documented by Status   Patient Acceptance E VU Purpose of PT/POC.  10/12/17 0920 Done               Point: Precautions (Done)    Learning Progress Summary    Learner Readiness Method Response Comment Documented by Status   Patient Acceptance E VU Purpose of PT/POC.  10/12/17 0920 Done                      User Key     Initials Effective Dates Name Provider Type Discipline     10/26/16 -  Citlalli Angela, PT Physical Therapist PT                PT  Recommendation and Plan  Anticipated Discharge Disposition: inpatient rehabilitation facility  Planned Therapy Interventions: balance training, bed mobility training, gait training, home exercise program, patient/family education, strengthening, transfer training  PT Frequency: daily  Plan of Care Review  Plan Of Care Reviewed With: patient  Outcome Summary/Follow up Plan: PT jarvis completed.  Patient reluctant to advance activity this date d/t reports of fatigue.  He presents with decreased strength, balance, endurance, and independence with mobility but is expected to benefit from continued skilled PT intervention to improve his overall functional mobility status prior to D/C.          IP PT Goals       10/12/17 0920          Bed Mobility PT LTG    Bed Mobility PT LTG, Date Established 10/12/17  -LM      Bed Mobility PT LTG, Time to Achieve 2 wks  -LM      Bed Mobility PT LTG, Activity Type supine to sit/sit to supine  -LM      Bed Mobility PT LTG, [place] Level conditional independence  -LM      Bed Mobility PT Goal  LTG, Assist Device bed rails  -LM      Bed Mobility PT LTG, Outcome goal ongoing  -LM      Transfer Training PT LTG    Transfer Training PT LTG, Date Established 10/12/17  -LM      Transfer Training PT LTG, Time to Achieve 2 wks  -LM      Transfer Training PT LTG, Activity Type sit to stand/stand to sit;bed to chair /chair to bed  -LM      Transfer Training PT LTG, [place] Level contact guard assist  -LM      Transfer Training PT LTG, Assist Device walker, rolling  -LM      Transfer Training PT LTG, Outcome goal ongoing  -LM      Gait Training PT LTG    Gait Training Goal PT LTG, Date Established 10/12/17  -LM      Gait Training Goal PT LTG, Time to Achieve 2 wks  -LM      Gait Training Goal PT LTG, [place] Level contact guard assist  -LM      Gait Training Goal PT LTG, Assist Device walker, rolling  -LM      Gait Training Goal PT LTG, Distance to Achieve 100  -LM      Gait Training  Goal PT LTG, Outcome goal ongoing  -LM      Strength Goal PT LTG    Strength Goal PT LTG, Date Established 10/12/17  -LM      Strength Goal PT LTG, Time to Achieve 2 wks  -LM      Strength Goal PT LTG, Measure to Achieve Patient will perform B LE ther ex x 15 reps to improve functional strength for mobility.  -LM      Strength Goal PT LTG, Outcome goal ongoing  -LM        User Key  (r) = Recorded By, (t) = Taken By, (c) = Cosigned By    Initials Name Provider Type    TITUS Angela PT Physical Therapist                Outcome Measures       10/12/17 0900          How much help from another person do you currently need...    Turning from your back to your side while in flat bed without using bedrails? 3  -LM      Moving from lying on back to sitting on the side of a flat bed without bedrails? 3  -LM      Moving to and from a bed to a chair (including a wheelchair)? 2  -LM      Standing up from a chair using your arms (e.g., wheelchair, bedside chair)? 2  -LM      Climbing 3-5 steps with a railing? 1  -LM      To walk in hospital room? 2  -LM      AM-PAC 6 Clicks Score 13  -LM      Functional Assessment    Outcome Measure Options AM-PAC 6 Clicks Basic Mobility (PT)  -LM        User Key  (r) = Recorded By, (t) = Taken By, (c) = Cosigned By    Initials Name Provider Type    TITUS Angela PT Physical Therapist           Time Calculation:         PT Charges       10/12/17 0924          Time Calculation    Start Time 0900  -LM      PT Received On 10/12/17  -LM      PT Goal Re-Cert Due Date 10/22/17  -LM        User Key  (r) = Recorded By, (t) = Taken By, (c) = Cosigned By    Initials Name Provider Type    TITUS Angela PT Physical Therapist          Therapy Charges for Today     Code Description Service Date Service Provider Modifiers Qty    76691628554 HC PT EVAL MOD COMPLEXITY 4 10/12/2017 Citlalli Angela PT GP 1          PT G-Codes  Outcome Measure Options: AM-PAC 6 Clicks Basic Mobility (PT)      Citlalli Angela  PT  10/12/2017

## 2017-10-12 NOTE — NURSING NOTE
Pt moving about, followed simple commands but pulling at all tubings and could not tell me about any injuries; nurses in room during assessment; left knee and back of head scab noted, multiple scabs on back/arms; two stage 3 small pressure wounds to buttocks/coccyx/perirectal areas; cleansed well but unable to maintain clean dressing due to continuous bowel movements; cleansed well and barrier applied; offloading booties reapplied and elbow protectors noted; pt very, very thin, with prominent bony prominences; plan of care discussed with pt and rn; one small older suture noted mid abdomen also

## 2017-10-12 NOTE — PROGRESS NOTES
[facility]    Name:  Master Hall   Age:  77 y.o.  Sex:  male  :  1940  MRN:  [id number]   Visit Number:  [id number]  Admission Date:  10/10/2017  Date Of Service:  10/12/17  Primary Care Physician:  No Known Provider     LOS: 1 day :  Patient Care Team:  No Known Provider as PCP - General:    History taken from:     patient chart    Chief Complaint:      Confusion  Subjective     Interval History:     Patient seen today.  Patient was admitted for sepsis with bilateral pneumonia.  He also has metabolic encephalopathy.  He is cachectic, and was found in urine and feces at home.  He had echocardiogram yesterday which showed check fraction 25% with global hypokinesis suggestive of ischemic cardiomyopathy.  He really does not have much of medical history.  He had left AMA last month as he was admitted for pneumonia.  He is confused and requiring restraints to keep his oxygen on.  He is a very poor historian and unable to communicate any history.  CT the brain done on admission was negative.  Repeat chest x-ray did show right lower lobe pneumonia.  He is on vancomycin, Zosyn and Zithromax.  He has not shown much improvement from yesterday.  Review of Systems:     Unable to obtain     Objective     Vital Signs:    Temp:  [97.2 °F (36.2 °C)-97.6 °F (36.4 °C)] 97.2 °F (36.2 °C)  Heart Rate:  [] 85  Resp:  [8-28] 18  BP: ()/() 102/68    Physical Exam:      General Appearance:    Awake, but not really responding to questioning.  Appears cachectic    Head:    Normocephalic, without obvious abnormality, atraumatic   Eyes:            Lids and lashes normal, conjunctivae and sclerae normal, no   icterus, no pallor, corneas clear, PERRLA   Ears:    Ears appear intact with no abnormalities noted   Throat:   No oral lesions, no thrush, oral mucosa moist   Neck:   No adenopathy, supple, trachea midline, no thyromegaly, no   carotid bruit, no JVD   Back:      No kyphosis present, no scoliosis present, no skin lesions,      erythema or scars, no tenderness to percussion or                   palpation,   range of motion normal   Lungs:     Rhonchi bilaterally without uses accessory muscles respiration     Heart:    Regular rhythm and normal rate, normal S1 and S2, no            murmur, no gallop, no rub, no click   Chest Wall:    No abnormalities observed   Abdomen:     Normal bowel sounds, no masses, no organomegaly, soft        non-tender, non-distended, no guarding, no rebound                tenderness   Rectal:     Deferred   Extremities:   Moves all extremities , no edema, no cyanosis, no             redness   Pulses:   Pulses palpable and equal bilaterally   Skin:   No bleeding, bruising or rash   Lymph nodes:   No palpable adenopathy   Neurologic:   Cranial nerves 2 - 12 grossly intact, sensation intact, DTR       present and equal bilaterally          Results Review:      I reviewed the patient's new clinical results.    Labs:  Lab Results (last 24 hours)     Procedure Component Value Units Date/Time    Blood Culture - Blood, [[id number]]  (Normal) Collected:  10/10/17 1814    Specimen:  Blood from Arm, Right Updated:  10/11/17 1831     Blood Culture No growth at 24 hours    Blood Culture - Blood, [[id number]]  (Normal) Collected:  10/10/17 1819    Specimen:  Blood from Arm, Left Updated:  10/11/17 1831     Blood Culture No growth at 24 hours    Renal Function Panel [[id number]]  (Abnormal) Collected:  10/12/17 0452    Specimen:  Blood Updated:  10/12/17 0602     Glucose 128 (H) mg/dL      BUN 37 (H) mg/dL      Creatinine 0.80 mg/dL      Sodium 148 (H) mmol/L      Potassium 3.1 (L) mmol/L      Chloride 113 (H) mmol/L      CO2 22.0 (L) mmol/L      Calcium 9.1 mg/dL      Albumin 2.60 (L) g/dL      Phosphorus 3.0 mg/dL      Anion Gap 16.1 mmol/L      BUN/Creatinine Ratio 46.3 (H)     eGFR Non African Amer 94 mL/min/1.73     Narrative:       The MDRD GFR formula is only  valid for adults with stable renal function between ages 18 and 70.    Magnesium [[id number]]  (Normal) Collected:  10/12/17 0452    Specimen:  Blood Updated:  10/12/17 0602     Magnesium 2.3 mg/dL     Vancomycin, Trough [[id number]]  (Abnormal) Collected:  10/12/17 0452    Specimen:  Blood Updated:  10/12/17 0617     Vancomycin Trough <5.00 (L) mcg/mL     CBC & Differential [[id number]] Collected:  10/12/17 0452    Specimen:  Blood Updated:  10/12/17 0641    Narrative:       The following orders were created for panel order CBC & Differential.  Procedure                               Abnormality         Status                     ---------                               -----------         ------                     Manual Differential[[id number]]          Abnormal            Final result               Scan Slide[[id number]]                                       Final result               CBC Auto Differential[[id number]]        Abnormal            Final result                 Please view results for these tests on the individual orders.    CBC Auto Differential [[id number]]  (Abnormal) Collected:  10/12/17 0452    Specimen:  Blood Updated:  10/12/17 0641     WBC 10.14 10*3/mm3      RBC 3.69 (L) 10*6/mm3      Hemoglobin 10.4 (L) g/dL      Hematocrit 33.1 (L) %      MCV 89.7 fL      MCH 28.2 pg      MCHC 31.4 g/dL      RDW 16.4 (H) %      RDW-SD 51.8 fl      MPV 12.1 (H) fL      Platelets 80 (L) 10*3/mm3     Scan Slide [[id number]] Collected:  10/12/17 0452    Specimen:  Blood Updated:  10/12/17 0641     Scan Slide --      See Manual Differential Results       Manual Differential [[id number]]  (Abnormal) Collected:  10/12/17 0452    Specimen:  Blood Updated:  10/12/17 0641     Neutrophil % 70.0 %      Lymphocyte % 1.0 (L) %      Monocyte % 2.0 %      Bands %  27.0 (H) %      Neutrophils Absolute 9.84 (H) 10*3/mm3      Lymphocytes Absolute 0.10 (L) 10*3/mm3      Monocytes Absolute 0.20 10*3/mm3      RBC Morphology Normal      Toxic Granulation Slight/1+     Platelet Estimate Decreased    Clostridium Difficile EIA - Stool, Per Rectum [[id number]] Collected:  10/12/17 1156    Specimen:  Stool from Per Rectum Updated:  10/12/17 1159           Radiology:    Imaging Results (last 24 hours)     Procedure Component Value Units Date/Time    XR Chest 1 View [[id number]] Collected:  10/12/17 0748     Updated:  10/12/17 0751    Narrative:       PROCEDURE: XR CHEST 1 VW-     HISTORY: pna; G93.40-Encephalopathy, unspecified; R79.89-Other specified  abnormal findings of blood chemistry; [id number]     COMPARISON: 10/11/2017.     FINDINGS: The heart is normal in size. The mediastinum is unremarkable.  There is worsening airspace disease in the right lung base consistent  with a pneumonia. The patient's left basilar airspace disease is stable.  There is no pneumothorax.  There are no acute osseous abnormalities.           Impression:       Worsening right basilar airspace disease consistent with a  pneumonia.     Continued followup is recommended.     This report was finalized on 10/12/2017 7:49 AM by Yohan Lambert M.D..          Medication Review:       acetylcysteine 4 mL Nebulization Q6H - RT   azithromycin 500 mg Intravenous Q24H   enoxaparin 40 mg Subcutaneous Daily   famotidine 20 mg Oral BID   guaiFENesin 600 mg Oral Q12H   ipratropium-albuterol 3 mL Nebulization Q6H - RT   iron sucrose (VENOFER) IVPB 100 mg Intravenous Q24H   multivitamin with minerals 1 tablet Oral Daily   piperacillin-tazobactam 4.5 g Intravenous Q8H   vancomycin 750 mg Intravenous Q24H         Pharmacy to dose vancomycin        Assessment/Plan     Problem List Items Addressed This Visit     Sepsis, unspecified organism    Relevant Medications    levoFLOXacin (LEVAQUIN) 500 MG tablet    * (Principal)Acute metabolic encephalopathy - Primary    Cachexia          Sepsis, present on admission, unknown source or organism                        With suspected pneumonia and  infection, hypothermia, heart rate greater than 90, elevated lactic acid  Suspect pneumonia, not seen with dehydration, present on admission   Acute hypoxia   Acute metabolic encephalopathy  Abnormal gait with fall at home  Failure to thrive with cachexia and malnutrition consider related to dementia with poor oral intake vs possible malignancy  Suspect chronic dementia  Sacral decubitus ulcer, present on admission  Hypoalbuminemia  Recent scalp laceration after fall at home  Bilateral ecchymoses knees, from crawling on knees at home with left knee abrasion  Very poor hygeine, with severe malnutrition  Elevated BNP  Elevated Lactic Acid  Dehydration  Suspected COPD  Anemia likely of chronic disease, with some iron deficiency  Staph epidermidis bacteremia last month, left AMA  History of rib fracture  Ejection fraction of 25% with global hypokinesis, suspect ischemic cardiomyopathy    Plan:    We'll continue with vancomycin, Zosyn and Zithromax.  Await blood culture results.  C. difficile has been sent as the patient is having liquid stools.  Ejection fraction was noted, Dr. Warner has been consulted.  Consult PT OT and speech therapy.  Replace his potassium.  Continue to monitor in the ICU.  Check stool for occult blood.   We will add Lipitor, low-dose Coreg, low-dose lisinopril, and aspirin.  Add troponin to this morning's labs.  Further recommendations will depend on clinical course.  Patient's prognosis is guarded.    Lenny Darnell,   10/12/17  12:42 PM

## 2017-10-12 NOTE — CONSULTS
Referring Provider: Carie   Reason for Consultation: CHF     Patient Care Team:  No Known Provider as PCP - General        History of present illness:  Middle-aged gentleman was presented with altered mental sensorium.  Has been noted to be generalized debility with poor Medication skills.  Has been treated for sepsis with bilateral pneumonia.  In the process of workup had an echo cardiac exam which reveals significantly diminished LV systolic function.  Unable to obtain a detailed history from the patient at this point in time.    Review of Systems   Pertinent items are noted in HPI  Review of Systems      History    #1 Baseline EKG sinus rhythm CT interval of 194 ms diffuse ST segment changes but    #2 LV function assessment EF 25-30% with inferior inferolateral severe hypokinesis but    #3 CAD workup none.  Enzymes negative.    #4 failure to thrive.    #5 dementia.    #6 Sacral Decubitus Ulcer Spelled    #7 COPD.    Personal history family history review of symptoms not available.  History reviewed. No pertinent surgical history., History reviewed. No pertinent family history., Social History   Substance Use Topics   • Smoking status: Former Smoker   • Smokeless tobacco: Never Used   • Alcohol use No   , Prescriptions Prior to Admission   Medication Sig Dispense Refill Last Dose   • albuterol (PROVENTIL HFA;VENTOLIN HFA) 108 (90 Base) MCG/ACT inhaler Inhale 2 puffs Every 4 (Four) Hours As Needed for Wheezing. 1 inhaler 0    • levoFLOXacin (LEVAQUIN) 500 MG tablet Take 1 tablet by mouth Daily. 7 tablet 0    , Scheduled Meds:    acetylcysteine 4 mL Nebulization Q6H - RT   aspirin 81 mg Oral Daily   atorvastatin 40 mg Oral Nightly   azithromycin 500 mg Intravenous Q24H   carvedilol 3.125 mg Oral Q12H   enoxaparin 40 mg Subcutaneous Daily   famotidine 20 mg Oral BID   guaiFENesin 600 mg Oral Q12H   ipratropium-albuterol 3 mL Nebulization Q6H - RT   iron sucrose (VENOFER) IVPB 100 mg Intravenous Q24H   lisinopril  "2.5 mg Oral Q24H   multivitamin with minerals 1 tablet Oral Daily   piperacillin-tazobactam 4.5 g Intravenous Q8H   vancomycin 750 mg Intravenous Q24H   , Continuous Infusions:    Pharmacy to dose vancomycin    , PRN Meds:  •  acetaminophen  •  benzonatate  •  bisacodyl  •  ipratropium-albuterol  •  ondansetron **OR** ondansetron ODT **OR** ondansetron  •  vancomycin **AND** Pharmacy to dose vancomycin  •  sodium chloride  •  sodium chloride, Allergies:  Review of patient's allergies indicates no known allergies.     Objective     Vital Sign Min/Max for last 24 hours  Temp  Min: 97.2 °F (36.2 °C)  Max: 98.9 °F (37.2 °C)   BP  Min: 81/55  Max: 119/73   Pulse  Min: 67  Max: 113   Resp  Min: 8  Max: 24   SpO2  Min: 83 %  Max: 100 %   Flow (L/min)  Min: 2  Max: 15   Weight  Min: 109 lb 6.4 oz (49.6 kg)  Max: 109 lb 6.4 oz (49.6 kg)     Flowsheet Rows         First Filed Value    Admission Height  74\" (188 cm) Documented at 10/10/2017 1717    Admission Weight  95 lb (43.1 kg) Documented at 10/10/2017 1717               Physical Exam:     General Appearance:   Unable to obtain any reasonable response to the patient on questioning.     Head:    Normocephalic, without obvious abnormality, atraumatic   Eyes:            Lids and lashes normal, conjunctivae and sclerae normal, no   icterus, no pallor, corneas clear, PERRLA   Ears:    Ears appear intact with no abnormalities noted   Throat:   No oral lesions, no thrush, oral mucosa moist   Neck:   No adenopathy, supple, trachea midline, no thyromegaly, no   carotid bruit, no JVD   Back:     No kyphosis present, no scoliosis present, no skin lesions,      erythema or scars, no tenderness to percussion or                   palpation,   range of motion normal   Lungs:     Clear to auscultation,respirations regular, even and                  unlabored    Heart:    Regular rhythm and normal rate, normal S1 and S2, no            murmur, no gallop, no rub, no click   Chest Wall:    No " abnormalities observed   Abdomen:     Normal bowel sounds, no masses, no organomegaly, soft        non-tender, non-distended, no guarding, no rebound                tenderness   Rectal:     Deferred   Extremities:   Moves all extremities well, no edema, no cyanosis, no             redness   Pulses: No palpable pulses in both lower extremities.     Skin:   No bleeding, bruising or rash   Lymph nodes:   No palpable adenopathy   Neurologic:   Cranial nerves 2 - 12 grossly intact, sensation intact, DTR       present and equal bilaterally         Results Review:   I reviewed the patient's new clinical results.    EKG: Sinus rhythm [place] of 190 ms diffuse ST segment changes.    LAB DATA :           WBC   Date Value Ref Range Status   10/12/2017 10.14 4.80 - 10.80 10*3/mm3 Final     RBC   Date Value Ref Range Status   10/12/2017 3.69 (L) 4.70 - 6.10 10*6/mm3 Final     Hemoglobin   Date Value Ref Range Status   10/12/2017 10.4 (L) 14.0 - 18.0 g/dL Final     Hematocrit   Date Value Ref Range Status   10/12/2017 33.1 (L) 42.0 - 52.0 % Final     MCV   Date Value Ref Range Status   10/12/2017 89.7 80.0 - 94.0 fL Final     MCH   Date Value Ref Range Status   10/12/2017 28.2 27.0 - 31.0 pg Final     MCHC   Date Value Ref Range Status   10/12/2017 31.4 30.0 - 37.0 g/dL Final     RDW   Date Value Ref Range Status   10/12/2017 16.4 (H) 11.5 - 14.5 % Final     RDW-SD   Date Value Ref Range Status   10/12/2017 51.8 37.0 - 54.0 fl Final     MPV   Date Value Ref Range Status   10/12/2017 12.1 (H) 6.0 - 12.0 fL Final     Platelets   Date Value Ref Range Status   10/12/2017 80 (L) 130 - 400 10*3/mm3 Final     Neutrophil %   Date Value Ref Range Status   10/11/2017 91.9 (H) 37.0 - 80.0 % Final     Lymphocyte %   Date Value Ref Range Status   10/11/2017 1.4 (L) 10.0 - 50.0 % Final     Monocyte %   Date Value Ref Range Status   10/11/2017 6.0 0.0 - 12.0 % Final     Eosinophil %   Date Value Ref Range Status   10/11/2017 0.0 0.0 - 7.0 % Final      Basophil %   Date Value Ref Range Status   10/11/2017 0.2 0.0 - 2.5 % Final     Immature Grans %   Date Value Ref Range Status   10/11/2017 0.5 0.0 - 0.6 % Final     Neutrophils, Absolute   Date Value Ref Range Status   10/11/2017 12.17 (H) 2.00 - 6.90 10*3/mm3 Final     Neutrophils Absolute   Date Value Ref Range Status   10/12/2017 9.84 (H) 2.00 - 6.90 10*3/mm3 Final     Lymphocytes, Absolute   Date Value Ref Range Status   10/11/2017 0.19 (L) 0.60 - 3.40 10*3/mm3 Final     Monocytes, Absolute   Date Value Ref Range Status   10/11/2017 0.79 0.00 - 0.90 10*3/mm3 Final     Eosinophils, Absolute   Date Value Ref Range Status   10/11/2017 0.00 0.00 - 0.70 10*3/mm3 Final     Basophils, Absolute   Date Value Ref Range Status   10/11/2017 0.02 0.00 - 0.20 10*3/mm3 Final     Immature Grans, Absolute   Date Value Ref Range Status   10/11/2017 0.07 (H) 0.00 - 0.06 10*3/mm3 Final     nRBC   Date Value Ref Range Status   10/11/2017 0.0 0.0 - 0.0 /100 WBC Final       Lab Results   Component Value Date    GLUCOSE 128 (H) 10/12/2017    BUN 37 (H) 10/12/2017    CREATININE 0.80 10/12/2017    EGFRIFNONA 94 10/12/2017    BCR 46.3 (H) 10/12/2017    CO2 22.0 (L) 10/12/2017    CALCIUM 9.1 10/12/2017    ALBUMIN 2.60 (L) 10/12/2017    LABIL2 0.9 (L) 10/10/2017    AST 26 10/10/2017    ALT 37 10/10/2017       Lab Results   Component Value Date    CKTOTAL 46 10/11/2017    TROPONINI 0.016 10/12/2017       No results found for: DDIMER    Site   Date Value Ref Range Status   10/12/2017 Arterial: left radial  Final     Ramon's Test   Date Value Ref Range Status   10/12/2017 Positive  Final     pH, Arterial   Date Value Ref Range Status   10/12/2017 7.468 7.300 - 7.500 pH units Final     pCO2, Arterial   Date Value Ref Range Status   10/12/2017 32.3 (L) 35.0 - 45.0 mm Hg Final     pO2, Arterial   Date Value Ref Range Status   10/12/2017 203.0 (H) 75.0 - 100.0 mm Hg Final     HCO3, Arterial   Date Value Ref Range Status   10/12/2017 23.3 22.0  - 28.0 mmol/L Final     Base Excess, Arterial   Date Value Ref Range Status   10/12/2017 -0.3 mmol/L Final     O2 Saturation, Arterial   Date Value Ref Range Status   10/11/2017 91.0 % Final     Hemoglobin, Blood Gas   Date Value Ref Range Status   10/12/2017 10.6 (L) 12 - 18 g/dL Final     Oxyhemoglobin   Date Value Ref Range Status   10/12/2017 98.4 94 - 99 % Final     Methemoglobin   Date Value Ref Range Status   10/12/2017 0.90 % Final     Carboxyhemoglobin   Date Value Ref Range Status   10/12/2017 0.7 % Final     Modality   Date Value Ref Range Status   10/12/2017 Cannula - High Flow  Final     FIO2   Date Value Ref Range Status   10/12/2017 65 % Final     No results found for: HGBA1C    Results from last 7 days  Lab Units 10/10/17  1726   TSH mIU/mL 4.240     Lab Results   Component Value Date    LIPASE 70 10/10/2017       IMAGING DATA:     Xr Chest 2 View    Result Date: 10/11/2017  Narrative: PROCEDURE: XR CHEST 2 VW-    HISTORY: ams, hypothermia  COMPARISON: September 23, 2017.  FINDINGS: The heart is normal in size. The mediastinum is unremarkable. There are emphysematous changes to the lungs. Patchy basilar opacities are similar to the prior exam. There is no pneumothorax. There are no acute osseous abnormalities.         Impression: Emphysematous changes with patchy basilar opacities which may reflect atelectasis or pneumonia.    This report was finalized on 10/11/2017 7:34 AM by Yohan Lambert M.D..    Xr Chest 2 View    Result Date: 9/24/2017  Narrative: PROCEDURE: XR CHEST 2 VW-  HISTORY: Simple Sepsis triage protocol  COMPARISON: None.  FINDINGS: Electrodes overlie the chest.The heart is normal in size. The mediastinum is unremarkable. Vascular calcification of the aorta. Chronic interstitial changes are noted, there is a right basilar opacity which may represent acute pneumonia. Right-sided rib fractures are noted posteriorly, likely chronic. There are more acute appearing fractures of the  lower lateral right ribs. Consider dedicated rib series. There is no pneumothorax.          Impression: 1. Chronic interstitial changes with asymmetrical opacity at the right base, this may be secondary to pneumonia. Continued follow-up is recommended.  2. Right-sided rib fractures are noted posteriorly, likely chronic. There are more acute appearing fractures of the lower lateral right ribs. Consider dedicated rib series.  Continued followup is recommended.  This report was finalized on 9/24/2017 7:46 AM by Shay Sanchez DO.    Ct Head Without Contrast    Result Date: 10/10/2017  Narrative: FINAL REPORT TECHNIQUE: Axial images through the head was performed without contrast.This study was performed with techniques to keep radiation doses as low as reasonably achievable, (ALARA). Individualized dose reduction techniques using automated exposure control or adjustment of mA and/or kV according to the patient's size were employed. CLINICAL HISTORY: AMS FINDINGS: There is moderate generalized atrophy. No abnormal density is seen.  Ventricles are normal.  There is no hemorrhage.  No mass effect is seen. Bone windows show no evidence of fracture.     Impression: No acute findings Authenticated by YADI Pollard MD on 10/10/2017 06:52:04 PM    Ct Head Without Contrast    Result Date: 9/23/2017  Narrative: FINAL REPORT TECHNIQUE: Multiple contiguous transaxial slices through the head were obtained without the intravenous administration of contrast. CLINICAL HISTORY: SYNCOPE WITH HEAD INJ. FINDINGS: There is age-appropriate cortical atrophy with associated ventricular enlargement. Patchy periventricular white matter low attenuation is most consistent with chronic microvascular ischemia. There is no acute infarct, hemorrhage or mass effect. If concern persists, recommend MRI. There are moderate air-fluid levels in each maxillary and sphenoid sinus with scattered sinus mucosal thickening. There is no skull fracture.  Impression: No acute intracranial abnormality Atrophy with chronic microvascular ischemia Acute on chronic sinusitis     Impression: Authenticated by Ric Oseguera MD on 09/23/2017 11:20:11 PM    Ct Chest Without Contrast    Result Date: 10/11/2017  Narrative: PROCEDURE: CT CHEST WO CONTRAST-  HISTORY: cough, shortness of breath, hypoxia, sepsis, altered mental status; G93.40-Encephalopathy, unspecified; R79.89-Other specified abnormal findings of blood chemistry; [id number]  COMPARISON:  None .  PROCEDURE:  Multiple axial CT images were obtained from the thoracic inlet through the upper abdomen without the use of contrast.  FINDINGS: Soft tissue windows reveal no axillary, hilar or mediastinal adenopathy. Heart size is normal. The aorta is normal in caliber. There are no pleural or pericardial effusions. Lung windows reveal patchy opacities in the bilateral upper and lower lobes consistent with a pneumonia. The visualized upper abdomen is unremarkable. Bone windows reveal no acute osseous abnormalities.      Impression: Diffuse patchy bilateral airspace disease consistent with a pneumonia.  787.88 mGy.cm     This study was performed with techniques to keep radiation doses as low as reasonably achievable (ALARA). Individualized dose reduction techniques using automated exposure control or adjustment of mA and/or kV according to the patient size were employed.  This report was finalized on 10/11/2017 7:35 AM by Yohan Lambert M.D..    Ct Cervical Spine Without Contrast    Result Date: 9/23/2017  Narrative: FINAL REPORT TECHNIQUE: Multiple contiguous transaxial slices through the cervical spine were obtained with coronal and sagittal reformatted images. CLINICAL HISTORY: SYNCOPE WITH HEAD INJ. FINDINGS: There is no acute fracture or subluxation. There is endplate osteophytosis and facet joint hypertrophy. Posterior disc osteophyte complexes in the lower spine are present. Prevertebral soft tissues are within  normal limits.     Impression: Degenerative changes but no acute abnormality Authenticated by Ric Oseguera MD on 09/23/2017 11:21:44 PM    Ct Lumbar Spine Without Contrast    Result Date: 10/11/2017  Narrative: PROCEDURE: CT LUMBAR SPINE WO CONTRAST-  HISTORY: abnormal gait, incontinence of bowel and bladder; G93.40-Encephalopathy, unspecified; R79.89-Other specified abnormal findings of blood chemistry; [id number]  TECHNIQUE: Multiple axial CT images were obtained through the lumbar spine using bone window algorithms. Coronal and sagittal images were reconstructed from the original axial data set.  FINDINGS: The lumbar spine is well aligned with no acute fractures. There are broad-based disc bulges from the L2 level distally producing mild-to-moderate central stenosis at multiple levels. The paraspinal soft tissues are unremarkable.          Impression: Degenerative change with no acute osseous abnormalities.              This study was performed with techniques to keep radiation doses as low as reasonably achievable (ALARA). Individualized dose reduction techniques using automated exposure control or adjustment of mA and/or kV according to the patient size were employed.   This report was finalized on 10/11/2017 7:36 AM by Yohan Lambert M.D..    Xr Chest 1 View    Result Date: 10/12/2017  Narrative: PROCEDURE: XR CHEST 1 VW-  HISTORY: pna; G93.40-Encephalopathy, unspecified; R79.89-Other specified abnormal findings of blood chemistry; [id number]  COMPARISON: 10/11/2017.  FINDINGS: The heart is normal in size. The mediastinum is unremarkable. There is worsening airspace disease in the right lung base consistent with a pneumonia. The patient's left basilar airspace disease is stable. There is no pneumothorax.  There are no acute osseous abnormalities.         Impression: Worsening right basilar airspace disease consistent with a pneumonia.  Continued followup is recommended.  This report was finalized  on 10/12/2017 7:49 AM by Yohan Lambert M.D..    Xr Chest 1 View    Result Date: 10/11/2017  Narrative: PROCEDURE: XR CHEST 1 VW-  HISTORY: hypoxia; G93.40-Encephalopathy, unspecified; R79.89-Other specified abnormal findings of blood chemistry; [id number]  COMPARISON: October 10, 2017.  FINDINGS: The heart is normal in size. The mediastinum is unremarkable. There is respiratory motion artifact. Patchy bilateral opacities predominantly involving the lung bases are unchanged. There is no pneumothorax.  There are no acute osseous abnormalities.         Impression: No significant change in the patients bilateral airspace disease.  Continued followup is recommended.  This report was finalized on 10/11/2017 11:58 AM by Yohan Lambert M.D..        DIAGNOSIS   #1 LV dysfunction: Patient has diminished LV systolic function nevertheless his BNP is close to thousand and the enzymes are negative more likely than not this is more of a chronic process.  Likely etiology should be multifactorial including malnutrition.  When his hemodynamics permit will try to initiate guideline therapy.    #2 coronary artery disease: Has intermediate to high risk profile for the same.  There is no evidence for acute coronary syndrome.  Would avoid any further workup at this point.    #3 generalized debility and malnutrition: This Could Be the Major Limiting Issue with Respect to His Long-Term Prognosis.          Principal Problem:    Acute metabolic encephalopathy  Active Problems:    Sepsis, unspecified organism    Hypothermia    Cachexia    Pneumonia          I discussed the patients findings and my recommendations with patient    Aroldo Warner MD  10/12/17  7:45 PM      Please note that portions of this note may have been completed with a voice recognition program. Efforts were made to edit the dictations, but occasionally words are mistranscribed.

## 2017-10-12 NOTE — PROGRESS NOTES
"Pharmacokinetic Follow-up Note - Vancomycin     Master Hall is a 77 y.o. male  74\" (188 cm) 109 lb 6.4 oz (49.6 kg)     Indication for use: sepsis      Results from last 7 days     Lab Units 10/12/17  0452 10/11/17  0528 10/10/17  1726   WBC 10*3/mm3 10.14 13.24* 11.98*   CREATININE mg/dL 0.80 1.00 1.00      Estimated Creatinine Clearance: 54.3 mL/min (by C-G formula based on Cr of 0.8).  Temp Readings from Last 1 Encounters:   10/12/17 97.2 °F (36.2 °C) (Oral)     Lab Results   Component Value Date    VANCOTROUGH <5.00 (L) 10/12/2017       Microbiology:  Microbiology Results (last 10 days)       Procedure Component Value - Date/Time      Blood Culture - Blood, [[id number]]  (Normal) Collected:  10/10/17 1819    Lab Status:  Preliminary result Specimen:  Blood from Arm, Left Updated:  10/11/17 1831     Blood Culture No growth at 24 hours    Blood Culture - Blood, [[id number]]  (Normal) Collected:  10/10/17 1814    Lab Status:  Preliminary result Specimen:  Blood from Arm, Right Updated:  10/11/17 1831     Blood Culture No growth at 24 hours            Current Vancomycin Dose:  750 mg IVPB every 36 hours,    Other Antimicrobials: Zosyn 4.5 g IV q8h    Assessment/Plan:  Vancomycin random level was less than 5, patient's renal function appears adequate to support q24h dosing. Will increase vancomycin frequency to q24h. Consider vancomycin trough in 2-3 days if therapy continues.  Pharmacy will continue to monitor renal function and adjust dose accordingly.    Carson Childers RPH   10/12/17 11:31 AM  "

## 2017-10-12 NOTE — THERAPY EVALUATION
Acute Care - Occupational Therapy Initial Evaluation  [facility]     Patient Name: Master Hall  : 1940  MRN: [id number]  Today's Date: 10/12/2017  Onset of Illness/Injury or Date of Surgery Date: 10/10/17  Date of Referral to OT: 10/12/17  Referring Physician: Dr. Darnell    Admit Date: 10/10/2017       ICD-10-CM ICD-9-CM   1. Acute encephalopathy G93.40 348.30   2. Elevated lactic acid level R79.89 276.2   3. Cachexia R64 799.4   4. Impaired functional mobility, balance, gait, and endurance Z74.09 V49.89   5. Impaired mobility and ADLs Z74.09 799.89     Patient Active Problem List   Diagnosis   • Sepsis, unspecified organism   • Acute metabolic encephalopathy   • Hypothermia   • Cachexia   • Pneumonia     History reviewed. No pertinent past medical history.  History reviewed. No pertinent surgical history.       OT ASSESSMENT FLOWSHEET (last 72 hours)      OT Evaluation       10/12/17 1202 10/12/17 0900 10/11/17 1800 10/11/17 1600 10/11/17 1400    Rehab Evaluation    Document Type evaluation  -SD evaluation  -LM       Subjective Information agree to therapy;decreased LOC  -SD agree to therapy;complains of;weakness;fatigue  -LM       Patient Effort, Rehab Treatment poor  -SD fair  -LM       Symptoms Noted During/After Treatment  fatigue  -LM       General Information    Patient Profile Review yes  -SD yes  -LM       Onset of Illness/Injury or Date of Surgery Date 10/10/17  -SD 10/10/17  -LM       Referring Physician Dr. Darnell  -SD [place]  -       General Observations Patient received supine in bed with IV intact, on 15 L O2  -SD Pt received supine in bed. O2 per n/c. Pt keeps eyes closed most of the time.  -LM       Pertinent History Of Current Problem Acute encephalopathy, syncope, pneumonia  -SD Acute encephalopathy;syncope,pneumonia  -LM       Precautions/Limitations fall precautions;oxygen therapy device and L/min  -SD fall precautions;oxygen therapy device and L/min  -LM       Prior Level of  "Function independent:;all household mobility;ADL's  -SD independent:;all household mobility  -LM       Equipment Currently Used at Home --   unknown  -SD --   Unknown-pt unable to state.  -LM       Plans/Goals Discussed With patient;agreed upon  -SD patient;agreed upon  -LM       Risks Reviewed patient:;dizziness;increased discomfort;change in vital signs  -SD patient:;LOB;increased discomfort  -LM       Benefits Reviewed patient:;improve function;increase independence;increase strength;increase balance  -SD patient:;improve function;increase independence  -LM       Barriers to Rehab medically complex  -SD previous functional deficit;ineffective coping  -LM       Living Environment    Lives With alone  -SD alone  -LM       Living Arrangements house  -SD house  -LM       Home Accessibility stairs to enter home;bed and bath on same level  -SD stairs to enter home;bed and bath on same level  -LM       Number of Stairs to Enter Home 3  -SD 3  -LM       Living Environment Comment patient reportedly found at home covered in feces unable to care for himself  -SD Pt found at home covered in feces.  -LM       Clinical Impression    Date of Referral to OT 10/12/17  -SD        OT Diagnosis ADL decline  -SD        Functional Level At Time Of Evaluation Patient unable to provide any history, states\" I don't know who I am, or where I'm at.\"  -SD        Impairments Found (describe specific impairments) aerobic capacity/endurance;gait, locomotion, and balance  -SD        Criteria for Skilled Therapeutic Interventions Met yes  -SD        Rehab Potential good, to achieve stated therapy goals  -SD        Therapy Frequency 3-5 times/wk  -SD        Anticipated Discharge Disposition inpatient rehabilitation facility  -SD        Vital Signs    Pre Systolic BP Rehab 91  -SD        Pre Treatment Diastolic BP 63  -SD        Pretreatment Heart Rate (beats/min) 96  -SD        Posttreatment Heart Rate (beats/min) 90  -SD        Pre SpO2 (%) 98  " -SD        O2 Delivery Pre Treatment supplemental O2   15L  -SD        Post SpO2 (%) 100  -SD        O2 Delivery Post Treatment supplemental O2   15L  -SD        Pain Assessment    Pain Assessment Unable to assess  -SD No/denies pain  -LM       Vision Assessment/Intervention    Visual Impairment WFL with corrective lenses  -SD        Cognitive Assessment/Intervention    Current Cognitive/Communication Assessment impaired  -SD impaired  -LM       Orientation Status disoriented x 4  -SD oriented to;person;place;situation;required verbal cueing (specifiy in comments)  -LM       Follows Commands/Answers Questions able to follow single-step instructions;needs cueing;needs repetition;needs increased time  -SD able to follow single-step instructions;needs cueing;needs increased time;needs repetition  -LM       Personal Safety decreased awareness, need for assist;decreased awareness, need for safety  -SD decreased awareness, need for assist;decreased awareness, need for safety  -LM       Personal Safety Interventions fall prevention program maintained;gait belt;muscle strengthening facilitated;nonskid shoes/slippers when out of bed  -SD fall prevention program maintained;gait belt;nonskid shoes/slippers when out of bed  -LM       ROM (Range of Motion)    General ROM no range of motion deficits identified  -SD no range of motion deficits identified  -LM       MMT (Manual Muscle Testing)    General MMT Assessment upper extremity strength deficits identified  -SD upper extremity strength deficits identified;lower extremity strength deficits identified  -LM       General MMT Assessment Detail 2/5  -SD Grossly 4-/5.  -LM       Muscle Tone Assessment    Muscle Tone Assessment   Bilateral Upper Extremities;Bilateral Lower Extremities  -KT Bilateral Upper Extremities;Bilateral Lower Extremities  -KT Bilateral Upper Extremities;Bilateral Lower Extremities  -KT    Bilateral Upper Extremities Muscle Tone Assessment   moderately  decreased tone  -KT moderately decreased tone  -KT moderately decreased tone  -KT    Bilateral Lower Extremities Muscle Tone Assessment   moderately decreased tone  -KT moderately decreased tone  -KT moderately decreased tone  -KT    Bed Mobility, Assessment/Treatment    Bed Mobility, Assistive Device  bed rails;head of bed elevated  -LM       Bed Mob, Supine to Sit, [place]  moderate assist (50% patient effort)  -LM       Bed Mob, Sit to Supine, [place]  moderate assist (50% patient effort)  -LM       Bed Mobility, Safety Issues  decreased use of arms for pushing/pulling;decreased use of legs for bridging/pushing  -LM       Bed Mobility, Impairments  strength decreased;impaired balance  -LM       Bed Mobility, Comment could not assess  -SD        Transfer Assessment/Treatment    Transfer, Comment could not assess  -SD Pt declined.  -LM       Functional Mobility    Functional Mobility- Comment could not assess  -SD        Upper Body Bathing Assessment/Training    UB Bathing Assess/Train, [place] Level dependent (less than 25% patient effort)  -SD        Lower Body Bathing Assessment/Training    LB Bathing Assess/Train, [place] Level dependent (less than 25% patient effort)  -SD        Upper Body Dressing Assessment/Training    UB Dressing Assess/Train, [place] dependent (less than 25% patient effort)  -SD        Lower Body Dressing Assessment/Training    LB Dressing Assess/Train, [place] dependent (less than 25% patient effort)  -SD        Toileting Assessment/Training    Toileting Assess/Train, [place]ep[place] Level dependent (less than 25% patient effort)  -SD        Grooming Assessment/Training    Grooming Assess/Train, Indepen Level maximum assist (25% patient effort)  -SD        Therapy Exercises    Bilateral Upper Extremity PROM:;10 reps;supine;elbow flexion/extension;shoulder extension/flexion  -SD        Positioning and Restraints    Pre-Treatment Position in bed  -SD in bed  -        Post Treatment Position bed  -SD bed  -LM       In Bed supine;call light within reach;encouraged to call for assist  -SD supine;call light within reach;encouraged to call for assist  -LM         10/11/17 1200 10/11/17 1159 10/11/17 0800 10/11/17 0502 10/11/17 0300    Rehab Evaluation    Evaluation Not Performed  unable to evaluate, medical status change   Pt being tranferred to ICU d/t decline in medical condition.  Hold PT eval.  -LM       General Information    Equipment Currently Used at Home    none  -     Living Environment    Transportation Available    car  -     Muscle Tone Assessment    Muscle Tone Assessment Bilateral Upper Extremities;Bilateral Lower Extremities  -KT    LUE;RUE;LLE;RLE  -CH    LUE Muscle Tone Assessment   mildly decreased tone  -NC  mildly decreased tone  -CH    RUE Muscle Tone Assessment   mildly decreased tone  -NC  mildly decreased tone  -CH    Bilateral Upper Extremities Muscle Tone Assessment moderately decreased tone  -KT        LLE Muscle Tone Assessment   mildly decreased tone  -NC  severely decreased tone  -CH    RLE Muscle Tone Assessment   mildly decreased tone  -NC  severely decreased tone  -CH    Bilateral Lower Extremities Muscle Tone Assessment moderately decreased tone  -KT          10/11/17 0152 10/11/17 0141             General Information    Equipment Currently Used at Home  none  -       Living Environment    Lives With [facility]  -        Living Arrangements house  -        Home Accessibility stairs to enter home;stairs within home  -        Number of Stairs to Enter Home --   SANJAY  -        Number of Stairs Within Home --   SANJAY  -        Stair Railings at Home other (see comments)   SANJAY  -        Type of Financial/Environmental Concern none  -        Transportation Available car  -        Living Environment Comment Unable to fully assess living conditions, pt is minimally verbal and confused to place  -        Functional Level Prior    Ambulation   "3-->assistive equipment and person  -CH       Transferring  3-->assistive equipment and person  -CH       Toileting  3-->assistive equipment and person  -CH       Bathing  3-->assistive equipment and person  -CH       Dressing  3-->assistive equipment and person  -CH       Eating  2-->assistive person  -CH       Communication  2-->difficulty speaking (not related to language barrier)  -CH       Swallowing  0-->swallows foods/liquids without difficulty  -CH         User Key  (r) = Recorded By, (t) = Taken By, (c) = Cosigned By    Initials Name Effective Dates    KT Tessa Ellison, RN 10/04/16 -     NC Zoila Martell, ALONSO 10/26/16 -     LM Citlalli Angela, PT 10/26/16 -     RICARDO Pittman OT 06/30/17 -     PARAS Hooper, ALONSO 08/31/17 -            Occupational Therapy Education     Title: PT OT SLP Therapies (Active)     Topic: Occupational Therapy (Active)     Point: ADL training (Done)    Description: Instruct learner(s) on proper safety adaptation and remediation techniques during self care or transfers.   Instruct in proper use of assistive devices.    Learning Progress Summary    Learner Readiness Method Response Comment Documented by Status   Patient Acceptance E VU Benefits of OT and OT POC SD 10/12/17 1259 Done                      User Key     Initials Effective Dates Name Provider Type Discipline    SD 06/30/17 -  Bella Pittman OT Occupational Therapist OT                  OT Recommendation and Plan  Anticipated Discharge Disposition: inpatient rehabilitation facility  Therapy Frequency: 3-5 times/wk  Plan of Care Review  Plan Of Care Reviewed With: patient  Progress: no change  Outcome Summary/Follow up Plan: OT eval completed. Patient was disoriented x 4 and could not provide any history. Stated \"I don't know who I am or where I\"m at.\" Patient required max verbal cues to follow one steps commands and sequence opening/closing mouth to eat thee bites of pudding. Patient presents deficits in " strength, endurance, functional activity tolerance, balance, mobility, coordination and ADL performance. Patient is expected to benefit from OT services to improve overall functional performance and mobility prior to DC.           OT Goals       10/12/17 1254          Transfer Training 2 OT LTG    Transfer Training 2 OT LTG, Date Established 10/12/17  -SD      Transfer Training 2 OT LTG, Time to Achieve 2 wks  -SD      Transfer Training 2 OT LTG, Activity Type sit to stand/stand to sit;toilet  -SD      Transfer Training 2 OT LTG, [place] Level minimum assist (75% patient effort)  -SD      Transfer Training 2 OT LTG, Assist Device walker, rolling  -SD      Transfer Training 2 OT LTG, Outcome goal ongoing  -SD      Strength OT LTG    Strength Goal OT LTG, Date Established 10/12/17  -SD      Strength Goal OT LTG, Time to Achieve 2 wks  -SD      Strength Goal OT LTG, Measure to Achieve Patient will perform 15 reps UB ther ex in order to increase strength and endurance.   -SD      Strength Goal OT LTG, Outcome goal ongoing  -SD      Static Standing Balance OT LTG    Static Standing Balance OT LTG, Date Established 10/12/17  -SD      Static Standing Balance OT LTG, Time to Achieve 2 wks  -SD      Static Standing Balance OT LTG, [place] Level minimum assist (75% patient effort)  -SD      Static Standing Balance OT LTG, Assist Device assistive device  -SD      Static Standing Balance OT LTG, Outcome goal ongoing  -SD      Grooming OT LTG    Grooming Goal OT LTG, Date Established 10/12/17  -SD      Grooming Goal OT LTG, Time to Achieve 2 wks  -SD      Grooming Goal OT LTG, [place] Level minimum assist (75% patient effort)  -SD      Grooming Goal OT LTG, Position sitting, edge of bed  -SD      Grooming Goal OT LTG, Outcome goal ongoing  -SD      Toileting OT LTG    Toileting Goal OT LTG, Date Established 10/12/17  -SD      Toileting Goal OT LTG, Time to Achieve 2 wks  -SD      Toileting Goal OT LTG,  [place] Level minimum assist (75% patient effort);2 person assist required  -SD      Toileting Goal OT LTG, Additional Goal BSC  -SD      Toileting Goal OT LTG, Outcome goal ongoing  -SD      Functional Mobility OT LTG    Functional Mobility Goal OT LTG, Date Established 10/12/17  -SD      Functional Mobility Goal OT LTG, Time to Achieve 2 wks  -SD      Functional Mobility Goal OT LTG, [place] Level min assist;x2  -SD      Functional Mobility Goal OT LTG, Assist Device rolling walker  -SD      Functional Mobility Goal OT LTG, Distance to Achieve to the door  -SD      Functional Mobility Goal OT LTG, Outcome goal ongoing  -SD        User Key  (r) = Recorded By, (t) = Taken By, (c) = Cosigned By    Initials Name Provider Type    RICARDO Pittman OT Occupational Therapist                Outcome Measures       10/12/17 1202 10/12/17 0900       How much help from another person do you currently need...    Turning from your back to your side while in flat bed without using bedrails?  3  -LM     Moving from lying on back to sitting on the side of a flat bed without bedrails?  3  -LM     Moving to and from a bed to a chair (including a wheelchair)?  2  -LM     Standing up from a chair using your arms (e.g., wheelchair, bedside chair)?  2  -LM     Climbing 3-5 steps with a railing?  1  -LM     To walk in hospital room?  2  -LM     AM-PAC 6 Clicks Score  13  -LM     How much help from another is currently needed...    Putting on and taking off regular lower body clothing? 1  -SD      Bathing (including washing, rinsing, and drying) 1  -SD      Toileting (which includes using toilet bed pan or urinal) 1  -SD      Putting on and taking off regular upper body clothing 2  -SD      Taking care of personal grooming (such as brushing teeth) 2  -SD      Eating meals 2  -SD      Score 9  -SD      Functional Assessment    Outcome Measure Options AM-PAC 6 Clicks Daily Activity (OT)  -SD AM-PAC 6 Clicks Basic Mobility (PT)   -LM       User Key  (r) = Recorded By, (t) = Taken By, (c) = Cosigned By    Initials Name Provider Type    LM Citlalli Angela, PT Physical Therapist    RICARDO Pittman, OT Occupational Therapist          Time Calculation:   OT Start Time: 1202    Therapy Charges for Today     Code Description Service Date Service Provider Modifiers Qty    [id number]  OT EVAL LOW COMPLEXITY 4 10/12/2017 Bella Pittman OT GO 1               Bella Pittman OT  10/12/2017

## 2017-10-12 NOTE — PLAN OF CARE
Problem: Patient Care Overview (Adult)  Goal: Plan of Care Review  Outcome: Ongoing (interventions implemented as appropriate)    10/12/17 1458   Coping/Psychosocial Response Interventions   Plan Of Care Reviewed With patient   Patient Care Overview   Progress no change   Outcome Evaluation   Outcome Summary/Follow up Plan (discussed importance subq )         Problem: Pressure Ulcer (Adult)  Goal: Signs and Symptoms of Listed Potential Problems Will be Absent or Manageable (Pressure Ulcer)  Outcome: Ongoing (interventions implemented as appropriate)    10/12/17 6168   Pressure Ulcer   Problems Assessed (Pressure Ulcer) all   Problems Present (Pressure Ulcer) none

## 2017-10-13 NOTE — PLAN OF CARE
Problem: Patient Care Overview (Adult)  Goal: Plan of Care Review  Outcome: Ongoing (interventions implemented as appropriate)    10/12/17 1959   Coping/Psychosocial Response Interventions   Plan Of Care Reviewed With patient   Patient Care Overview   Progress improving       Goal: Adult Individualization and Mutuality  Outcome: Ongoing (interventions implemented as appropriate)  Goal: Discharge Needs Assessment  Outcome: Ongoing (interventions implemented as appropriate)    Problem: Nutrition, Imbalanced: Inadequate Oral Intake (Adult)  Goal: Identify Related Risk Factors and Signs and Symptoms  Outcome: Ongoing (interventions implemented as appropriate)  Goal: Improved Oral Intake  Outcome: Ongoing (interventions implemented as appropriate)  Goal: Prevent Further Weight Loss  Outcome: Ongoing (interventions implemented as appropriate)    Problem: Fall Risk (Adult)  Goal: Identify Related Risk Factors and Signs and Symptoms  Outcome: Ongoing (interventions implemented as appropriate)  Goal: Absence of Falls  Outcome: Ongoing (interventions implemented as appropriate)    Problem: Pressure Ulcer (Adult)  Goal: Signs and Symptoms of Listed Potential Problems Will be Absent or Manageable (Pressure Ulcer)  Outcome: Ongoing (interventions implemented as appropriate)

## 2017-10-13 NOTE — PLAN OF CARE
Problem: SAFETY - NON-VIOLENT RESTRAINT  Goal: Remains free of injury from restraints (Non-Violent Restraint)  Outcome: Outcome(s) achieved Date Met:  10/12/17  Goal: Free from restraint(s) (Non-Violent Restraint)  Outcome: Outcome(s) achieved Date Met:  10/12/17

## 2017-10-13 NOTE — THERAPY EVALUATION
Acute Care - Speech Language Pathology   Swallow Initial Evaluation  [facility]     Patient Name: Master Hall  : 1940  MRN: [id number]  Today's Date: 10/13/2017  Onset of Illness/Injury or Date of Surgery Date: 10/10/17     Referring Physician: Carie      Admit Date: 10/10/2017    Visit Dx:     ICD-10-CM ICD-9-CM   1. Acute encephalopathy G93.40 348.30   2. Elevated lactic acid level R79.89 276.2   3. Cachexia R64 799.4   4. Impaired functional mobility, balance, gait, and endurance Z74.09 V49.89   5. Impaired mobility and ADLs Z74.09 799.89     Patient Active Problem List   Diagnosis   • Sepsis, unspecified organism   • Acute metabolic encephalopathy   • Hypothermia   • Cachexia   • Pneumonia     History reviewed. No pertinent past medical history.  History reviewed. No pertinent surgical history.       SWALLOW EVALUATION (last 72 hours)      Swallow Evaluation       10/13/17 0900                Rehab Evaluation    Document Type evaluation  -ES        Subjective Information no complaints  -ES        General Information    Patient Profile Review yes  -ES        Onset of Illness/Injury 10/12/17  -ES        Referring Physician Carie  -ES        Subjective Patient Observations confusion  -ES        Pertinent History Of Current Problem most recent chest xray indicates pneumonia  -ES        Prior Level of Function- Swallowing no diet consistency restrictions  -ES        Barriers to Rehab cognitive status  -ES        Clinical Impression    SLP Swallowing Diagnosis mild dysphagia  -ES        Criteria for Skilled Therapeutic Interventions Met no problems identified which require skilled intervention  -ES        SLP Diet Recommendation IV - mechanical soft, no mixed consistencies;thin liquids  -ES        Recommended Feeding/Eating Techniques maintain upright posture during/after eating for 30 mins  -ES        SLP Rec. for Method of Medication Administration meds whole with thin liquid;meds whole in  pudding/applesauce;meds crushed in pudding/applesauce  -ES        Monitor For Signs Of Aspiration cough;gurgly voice  -ES        Pain Assessment    Pain Assessment No/denies pain  -ES        Cognitive Assessment/Intervention    Current Cognitive/Communication Assessment impaired  -ES        Orientation Status oriented to;person  -ES        Follows Commands/Answers Questions 75% of the time;able to follow single-step instructions;needs cueing;needs increased time  -ES        Oral Motor Structure and Function    Oral Motor Anatomy and Physiology patient demonstrates anatomy that is WNL  -ES        Oral Lesions or Structural Abnormalities candidiasis  -ES        Dentition Assessment edentulous, does not have dentures  -ES        Secretion Management WNL/WFL  -ES        Oral Musculature General Assessment lingual impairment  -ES        Lingual Strength and Mobility reduced ROM;reduced strength bilaterally;other (see comments)   minimal  -ES        Clinical Swallow Exam    Mode of Presentation self fed;straw  -ES        Oral Preparation Concerns cohesive solid:;increased prep time  -ES        Oral Residue cohesive solid:;tongue residue;other (see comments)   affected by dentition  -ES        Oral Phase Results impaired oral phase, signs of dysfunction present;other (see comments)   minimal--able to compensate  -ES        Pharyngeal Phase Results other (see comments)   mildly delayed swallow initiation @ times  -ES        Summary of Clinical Exam Pt seen at bedside w/ nsg present for exam.  Pt cooperative with evaluation tasks.  Pt presents with minimal oropharyngeal dysphagia characterized by increased bolus prep time with solids further compounded by dentition and mildly delayed swallow initiation.  No overt s/s aspiration.  Recommend mechanical soft diet texture with thin liquids.  No further ST recommended @ this time; however please re-consult if pt develops worsening pneumonia.  -ES        Positioning and Restraints     Pre-Treatment Position in bed  -ES        Post Treatment Position bed  -ES        In Bed call light within reach;with nsg  -ES          User Key  (r) = Recorded By, (t) = Taken By, (c) = Cosigned By    Initials Name Effective Dates    ES Heide Farris MS CCC-SLP 10/26/16 -         EDUCATION  The patient has been educated in the following areas:   Dysphagia (Swallowing Impairment).    SLP Recommendation and Plan  SLP Swallowing Diagnosis: mild dysphagia  SLP Diet Recommendation: IV - mechanical soft, no mixed consistencies, thin liquids  Recommended Feeding/Eating Techniques: maintain upright posture during/after eating for 30 mins  SLP Rec. for Method of Medication Administration: meds whole with thin liquid, meds whole in pudding/applesauce, meds crushed in pudding/applesauce  Monitor For Signs Of Aspiration: cough, gurgly voice     Criteria for Skilled Therapeutic Interventions Met: no problems identified which require skilled intervention                      Plan of Care Review  Plan Of Care Reviewed With: patient  Outcome Summary/Follow up Plan: clinical swallow eval complete, no overt s/s aspiration, recommend mechanical soft diet texture w/ thin liquids, no further ST recommended @ this time; however please re-consult if pt develops worsening pneumonia             Time Calculation:         Time Calculation- SLP       10/13/17 0928          Time Calculation- SLP    SLP Start Time 0900  -ES        User Key  (r) = Recorded By, (t) = Taken By, (c) = Cosigned By    Initials Name Provider Type    ES Heide Farris MS CCC-SLP Speech and Language Pathologist          Therapy Charges for Today     Code Description Service Date Service Provider Modifiers Qty    [id number]  ST EVAL ORAL PHARYNG SWALLOW 4 10/13/2017 Heide Farris MS CCC-SLP GN 1               Heide Farris MS CCC-SLP  10/13/2017

## 2017-10-13 NOTE — PLAN OF CARE
Problem: Patient Care Overview (Adult)  Goal: Plan of Care Review  Outcome: Ongoing (interventions implemented as appropriate)    10/13/17 1555   Coping/Psychosocial Response Interventions   Plan Of Care Reviewed With patient   Patient Care Overview   Progress improving         Problem: NPPV/CPAP (Adult)  Goal: Signs and Symptoms of Listed Potential Problems Will be Absent or Manageable (NPPV/CPAP)  Outcome: Ongoing (interventions implemented as appropriate)    10/13/17 1555   NPPV/CPAP   Problems Present (NPPV/CPAP) none

## 2017-10-13 NOTE — PLAN OF CARE
Problem: NPPV/CPAP (Adult)  Goal: Signs and Symptoms of Listed Potential Problems Will be Absent or Manageable (NPPV/CPAP)  Outcome: Ongoing (interventions implemented as appropriate)    10/13/17 0051   NPPV/CPAP   Problems Assessed (NPPV/CPAP) all   Problems Present (NPPV/CPAP) none

## 2017-10-13 NOTE — PROGRESS NOTES
[facility]    PROGRESS NOTE    Name:  Master Hall   Age:  77 y.o.  Sex:  male  :  1940  MRN:  [id number]   Visit Number:  [id number]  Admission Date:  10/10/2017  Date Of Service:  10/13/17  Primary Care Physician:  No Known Provider     LOS: 2 days :  Patient Care Team:  No Known Provider as PCP - General:    History taken from:     patient    Chief Complaint:      Dyspnea    Subjective     Interval History:     Patient is much more awake today.  He is admitted for sepsis with bilateral pneumonia.  He also has metabolic encephalopathy but he is improved.  He has severe malnutrition and is cachectic.  He also has an ejection fraction of 25%, Dr. Warner's been consulted and is following along.  His low blood pressure has reoccluded the ability to place him on medications for his heart failure.  Dr. Warner recommended workup as he improves.  He continues on vancomycin, Zosyn and Zithromax at present.  Speech therapy did see the patient and did not note that he was aspirating, and made recommendations for his diet.  He is noted be hyponatremic, and has low blood pressure at times.  He has been given D5W at the present time.  He also required to 250 cc bolus for hypotension.  PT and OT are working with him as well.  He describes some sort of abdominal procedure that he had recently, however he is a very poor historian and unable to describe where this was done and what happened.  He does claim to still have some abdominal pain, but this is not keeping him from eating.  The patient is eating all of his food, and appears as if he is starving whenever food is put before him.  Patient is following him.  Supplements are being given as well.  Patient denies any chest pressure, nausea, vomiting, or any other pain at the present time.  He is still somewhat short of breath with a cough, however this is much improved.  He remains in the intensive care unit due to his intermittent  hypotension.  Occult blood in stool was negative.    Review of Systems:     All systems were reviewed and negative except for:  Respiratory: positive for  shortness of air    Objective     Vital Signs:    Temp:  [97.5 °F (36.4 °C)-98.5 °F (36.9 °C)] 97.6 °F (36.4 °C)  Heart Rate:  [] 75  Resp:  [10-20] 17  BP: ()/(44-79) 88/51    Physical Exam:      General Appearance:    Alert, cooperative, Appears cachectic.     Head:    Normocephalic, without obvious abnormality, atraumatic   Eyes:            Lids and lashes normal, conjunctivae and sclerae normal, no   icterus, no pallor, corneas clear, PERRLA   Ears:    Ears appear intact with no abnormalities noted   Throat:   No oral lesions, no thrush, oral mucosa moist   Neck:   No adenopathy, supple, trachea midline, no thyromegaly, no   carotid bruit, no JVD   Back:     No kyphosis present, no scoliosis present, no skin lesions,      erythema or scars, no tenderness to percussion or                   palpation,   range of motion normal   Lungs:     Bilateral rhonchi noted without uses accessory muscles respiration.      Heart:    Regular rhythm and normal rate, normal S1 and S2, no            murmur, no gallop, no rub, no click   Chest Wall:    No abnormalities observed   Abdomen:     Normal bowel sounds, no masses, no organomegaly, soft        non-tender, non-distended, no guarding, no rebound                tenderness   Rectal:     Deferred   Extremities:   Moves all extremities well, no edema, no cyanosis, no             redness   Pulses:   Pulses palpable and equal bilaterally   Skin:   No bleeding, bruising or rash   Lymph nodes:   No palpable adenopathy   Neurologic:   Cranial nerves 2 - 12 grossly intact, sensation intact, DTR       present and equal bilaterally          Results Review:      I reviewed the patient's new clinical results.    Labs:  Lab Results (last 24 hours)     Procedure Component Value Units Date/Time    Occult Blood X 1, Stool -  Stool, [[id number]]  (Normal) Collected:  10/12/17 1153    Specimen:  Stool Updated:  10/12/17 1347     Fecal Occult Blood Negative    Clostridium Difficile EIA - Stool, Per Rectum [[id number]]  (Normal) Collected:  10/12/17 1156    Specimen:  Stool from Per Rectum Updated:  10/12/17 1436     C difficile Toxins A+B, EIA Negative    Blood Culture - Blood, [[id number]]  (Normal) Collected:  10/10/17 1814    Specimen:  Blood from Arm, Right Updated:  10/12/17 1831     Blood Culture No growth at 2 days    Blood Culture - Blood, [[id number]]  (Normal) Collected:  10/10/17 1819    Specimen:  Blood from Arm, Left Updated:  10/12/17 1831     Blood Culture No growth at 2 days    CBC & Differential [[id number]] Collected:  10/13/17 0408    Specimen:  Blood Updated:  10/13/17 0521    Narrative:       The following orders were created for panel order CBC & Differential.  Procedure                               Abnormality         Status                     ---------                               -----------         ------                     CBC Auto Differential[[id number]]        Abnormal            Final result                 Please view results for these tests on the individual orders.    CBC Auto Differential [[id number]]  (Abnormal) Collected:  10/13/17 0408    Specimen:  Blood Updated:  10/13/17 0521     WBC 10.43 10*3/mm3      RBC 3.37 (L) 10*6/mm3      Hemoglobin 9.6 (L) g/dL      Hematocrit 30.4 (L) %      MCV 90.2 fL      MCH 28.5 pg      MCHC 31.6 g/dL      RDW 16.6 (H) %      RDW-SD 53.4 fl      MPV 12.6 (H) fL      Platelets 69 (L) 10*3/mm3      Neutrophil % 92.2 (H) %      Lymphocyte % 2.4 (L) %      Monocyte % 4.7 %      Eosinophil % 0.0 %      Basophil % 0.1 %      Immature Grans % 0.6 %      Neutrophils, Absolute 9.62 (H) 10*3/mm3      Lymphocytes, Absolute 0.25 (L) 10*3/mm3      Monocytes, Absolute 0.49 10*3/mm3      Eosinophils, Absolute 0.00 10*3/mm3      Basophils, Absolute 0.01 10*3/mm3      Immature Grans,  Absolute 0.06 10*3/mm3      nRBC 0.0 /100 WBC     Magnesium [[id number]]  (Abnormal) Collected:  10/13/17 0408    Specimen:  Blood Updated:  10/13/17 0645     Magnesium 2.4 (H) mg/dL     Comprehensive Metabolic Panel [[id number]]  (Abnormal) Collected:  10/13/17 0408    Specimen:  Blood Updated:  10/13/17 0702     Glucose 83 mg/dL      BUN 31 (H) mg/dL      Creatinine 0.80 mg/dL      Sodium 150 (H) mmol/L      Potassium 3.0 (L) mmol/L      Chloride 115 (H) mmol/L      CO2 28.0 mmol/L      Calcium 8.9 mg/dL      Total Protein 5.1 (L) g/dL      Albumin 2.30 (L) g/dL      ALT (SGPT) 31 U/L      AST (SGOT) 35 U/L      Alkaline Phosphatase 129 (H) U/L      Total Bilirubin 0.4 mg/dL      eGFR Non African Amer 94 mL/min/1.73      Globulin 2.8 gm/dL      A/G Ratio 0.8 (L) g/dL      BUN/Creatinine Ratio 38.8 (H)     Anion Gap 10.0 mmol/L     Narrative:       The MDRD GFR formula is only valid for adults with stable renal function between ages 18 and 70.           Radiology:    Imaging Results (last 24 hours)     ** No results found for the last 24 hours. **          Medication Review:       acetylcysteine 4 mL Nebulization Q6H - RT   aspirin 81 mg Oral Daily   atorvastatin 40 mg Oral Nightly   azithromycin 500 mg Intravenous Q24H   carvedilol 3.125 mg Oral Q12H   enoxaparin 40 mg Subcutaneous Daily   famotidine 20 mg Oral BID   guaiFENesin 600 mg Oral Q12H   ipratropium-albuterol 3 mL Nebulization Q6H - RT   iron sucrose (VENOFER) IVPB 100 mg Intravenous Q24H   lisinopril 2.5 mg Oral Q24H   multivitamin with minerals 1 tablet Oral Daily   nystatin 5 mL Oral 4x Daily   piperacillin-tazobactam 4.5 g Intravenous Q8H   potassium chloride 40 mEq Oral Once   sodium chloride 250 mL Intravenous Once   vancomycin 750 mg Intravenous Q24H         dextrose 75 mL/hr Last Rate: 75 mL/hr (10/13/17 0816)   Pharmacy to dose vancomycin         Assessment/Plan     Problem List Items Addressed This Visit     Sepsis, unspecified organism     Relevant Medications    levoFLOXacin (LEVAQUIN) 500 MG tablet    * (Principal)Acute metabolic encephalopathy - Primary    Cachexia          Sepsis, present on admission, unknown source or organism                        With suspected pneumonia and infection, hypothermia, heart rate greater than 90, elevated lactic acid  Suspect pneumonia, not seen with dehydration, present on admission   Acute hypoxia   Acute metabolic encephalopathy  Abnormal gait with fall at home  Failure to thrive with cachexia and malnutrition consider related to dementia with poor oral intake vs possible malignancy  Suspect chronic dementia  Sacral decubitus ulcer, present on admission  Hypoalbuminemia  Recent scalp laceration after fall at home  Bilateral ecchymoses knees, from crawling on knees at home with left knee abrasion  Very poor hygeine, with severe malnutrition  Elevated BNP  Elevated Lactic Acid  Dehydration  Suspected COPD  Anemia likely of chronic disease, with some iron deficiency  Staph epidermidis bacteremia last month, left AMA  History of rib fracture  Ejection fraction of 25% with global hypokinesis, suspect ischemic cardiomyopathy  Hypernatremia  Acute hypoxic respiratory failure    Plan:    Continue with antibiotics.  We'll discontinue vancomycin as his cultures have been negative thus far.  Will give D5W as he has hypernatremia.  Nutrition we'll continue to follow as well as PT and OT.  Once his blood pressure comes up, he could be transferred out of the intensive care unit.  He is iron deficient, being infused with iron, suspect from malnutrition.  He also has oral thrush, added nystatin swish and spit.   have been consulted for possible placement for this gentleman.  Continue Lovenox for DVT prophylaxis.  Further recommendations will depend on clinical course.  Check lab work in the a.m.  We'll continue to follow.    Lenny Darnell,   10/13/17  1:41 PM

## 2017-10-13 NOTE — PROGRESS NOTES
"Continued Stay Note  [facility]     Patient Name: Master Hall  MRN: [id number]  Today's Date: 10/13/2017    Admit Date: 10/10/2017          Discharge Plan       10/13/17 6721    Case Management/Social Work Plan    Plan Discharge Planning and Assessment    Additional Comments TELLO met with pt at bedside while in ICU to initiate discharge planning and assess pt's living conditions. At the time of visit pt was only oriented to self and unable to recall home address, phone number, current location and situation. Pt verbalizes not being sure of the circumstances of current hospital admission and just refers to \"going down and everything exploding after that\". Pt is also very emaciated at this time which may suggest he has not been taking care of or not able to take care of himself. SW did discuss going to a rehab facility and pt states that he would be agreeable to this in order to get better. Pt also states that he is an [employer] . TELLO did contact pt's neighbor Stephanie Lopes for further information and she states that pt does keep his home up and has plenty of food at home. She does verify that no one checks on pt and that he does not have any family/friends that she is aware of. TELLO did leave a ms for state guardianship office requesting assistance with initiating a search that may identify any living relative that pt has. Waiting to hear back. TELLO will continue to follow and assit with possible request for guardianship and or setting up placement.              Discharge Codes     None            April  NESSA Hi, ARCHIE  10/13/17  2:16 PM      "

## 2017-10-13 NOTE — SIGNIFICANT NOTE
10/13/17 1332   Rehab Treatment   Discipline occupational therapist   Treatment Not Performed other (see comments)  (Patient is a hold per Ns due to low BP )

## 2017-10-13 NOTE — SIGNIFICANT NOTE
10/13/17 1347   Rehab Treatment   Discipline physical therapy assistant   Treatment Not Performed other (see comments)  (Nurse requested to hold treatment this date d/t low BP. Therapy will f/u with pt tomorrow. )

## 2017-10-14 NOTE — PLAN OF CARE
Problem: Patient Care Overview (Adult)  Goal: Plan of Care Review  Outcome: Ongoing (interventions implemented as appropriate)    10/14/17 1454   Coping/Psychosocial Response Interventions   Plan Of Care Reviewed With patient   Patient Care Overview   Progress improving         Problem: NPPV/CPAP (Adult)  Goal: Signs and Symptoms of Listed Potential Problems Will be Absent or Manageable (NPPV/CPAP)  Outcome: Ongoing (interventions implemented as appropriate)    10/14/17 1454   NPPV/CPAP   Problems Assessed (NPPV/CPAP) skin breakdown;hypoxia/hypoxemia   Problems Present (NPPV/CPAP) none

## 2017-10-14 NOTE — PLAN OF CARE
Problem: Patient Care Overview (Adult)  Goal: Plan of Care Review  Outcome: Ongoing (interventions implemented as appropriate)    10/13/17 2000   Coping/Psychosocial Response Interventions   Plan Of Care Reviewed With patient   Patient Care Overview   Progress improving       Goal: Adult Individualization and Mutuality  Outcome: Ongoing (interventions implemented as appropriate)  Goal: Discharge Needs Assessment  Outcome: Ongoing (interventions implemented as appropriate)    Problem: Nutrition, Imbalanced: Inadequate Oral Intake (Adult)  Goal: Identify Related Risk Factors and Signs and Symptoms  Outcome: Ongoing (interventions implemented as appropriate)  Goal: Improved Oral Intake  Outcome: Ongoing (interventions implemented as appropriate)  Goal: Prevent Further Weight Loss  Outcome: Ongoing (interventions implemented as appropriate)    Problem: Fall Risk (Adult)  Goal: Absence of Falls  Outcome: Ongoing (interventions implemented as appropriate)

## 2017-10-14 NOTE — PROGRESS NOTES
[facility]    Name:  Master Hall   Age:  77 y.o.  Sex:  male  :  1940  MRN:  [id number]   Visit Number:  [id number]  Admission Date:  10/10/2017  Date Of Service:  10/14/17  Primary Care Physician:  No Known Provider     LOS: 3 days :  Patient Care Team:  No Known Provider as PCP - General:    History taken from:     patient    Chief Complaint:      Malnutrition    Subjective     Interval History:     Patient is awake and alert today.  He is admitted for acute hypoxic respiratory failure, sepsis, bilateral pneumonia, severe malnutrition with cachexia.  He also has systolic cardiomyopathy with ejection fraction of 25%, not in CHF at present time.  He has had low blood pressure, precluding the use of medications for cardiomyopathy.   Dr. Warner is following.  PT and OT are working with him.  He seems awake and alert.  He is very difficult to understand, making him a poor historian.  He denies any chest pressure, shortness breath, nausea, vomiting, or pain.    Review of Systems:     All systems were reviewed and negative except for:  Constitution:  positive for weight loss    Objective     Vital Signs:    Temp:  [97.5 °F (36.4 °C)-97.7 °F (36.5 °C)] 97.6 °F (36.4 °C)  Heart Rate:  [65-98] 75  Resp:  [14-22] 22  BP: ()/(44-83) 82/44    Physical Exam:      General Appearance:    Alert, cooperative, in no acute distress.  He appears cachectic.     Head:    Normocephalic, without obvious abnormality, atraumatic   Eyes:            Lids and lashes normal, conjunctivae and sclerae normal, no   icterus, no pallor, corneas clear, PERRLA   Ears:    Ears appear intact with no abnormalities noted   Throat:   No oral lesions, no thrush, oral mucosa moist   Neck:   No adenopathy, supple, trachea midline, no thyromegaly, no   carotid bruit, no JVD   Back:     No kyphosis present, no scoliosis present, no skin lesions,      erythema or scars, no tenderness to percussion  or                   palpation,   range of motion normal   Lungs:     Clear to auscultation,respirations regular, even and                  unlabored    Heart:    Regular rhythm and normal rate, normal S1 and S2, no            murmur, no gallop, no rub, no click   Chest Wall:    No abnormalities observed   Abdomen:     Normal bowel sounds, no masses, no organomegaly, soft        non-tender, non-distended, no guarding, no rebound                tenderness   Rectal:     Deferred   Extremities:   Moves all extremities well, no edema, no cyanosis, no             redness   Pulses:   Pulses palpable and equal bilaterally   Skin:   No bleeding, bruising or rash   Lymph nodes:   No palpable adenopathy   Neurologic:   Cranial nerves 2 - 12 grossly intact, sensation intact, DTR       present and equal bilaterally          Results Review:      I reviewed the patient's new clinical results.    Labs:  Lab Results (last 24 hours)     Procedure Component Value Units Date/Time    Blood Culture - Blood, [[id number]]  (Normal) Collected:  10/10/17 1814    Specimen:  Blood from Arm, Right Updated:  10/13/17 1831     Blood Culture No growth at 3 days    Blood Culture - Blood, [[id number]]  (Normal) Collected:  10/10/17 1819    Specimen:  Blood from Arm, Left Updated:  10/13/17 1831     Blood Culture No growth at 3 days    Magnesium [[id number]]  (Normal) Collected:  10/14/17 0427    Specimen:  Blood Updated:  10/14/17 0604     Magnesium 2.1 mg/dL     Renal Function Panel [[id number]]  (Abnormal) Collected:  10/14/17 0427    Specimen:  Blood Updated:  10/14/17 0609     Glucose 80 mg/dL      BUN 25 (H) mg/dL      Creatinine 0.70 mg/dL      Sodium 144 mmol/L      Potassium 3.2 (L) mmol/L      Chloride 111 (H) mmol/L      CO2 28.0 mmol/L      Calcium 8.9 mg/dL      Albumin 2.30 (L) g/dL      Phosphorus 2.1 (L) mg/dL      Anion Gap 8.2 mmol/L      BUN/Creatinine Ratio 35.7 (H)     eGFR Non African Amer 109 mL/min/1.73     Narrative:       The  MDRD GFR formula is only valid for adults with stable renal function between ages 18 and 70.    CBC & Differential [[id number]] Collected:  10/14/17 0427    Specimen:  Blood Updated:  10/14/17 0609    Narrative:       The following orders were created for panel order CBC & Differential.  Procedure                               Abnormality         Status                     ---------                               -----------         ------                     CBC Auto Differential[[id number]]        Abnormal            Final result                 Please view results for these tests on the individual orders.    CBC Auto Differential [[id number]]  (Abnormal) Collected:  10/14/17 0427    Specimen:  Blood Updated:  10/14/17 0609     WBC 8.05 10*3/mm3      RBC 3.50 (L) 10*6/mm3      Hemoglobin 9.9 (L) g/dL      Hematocrit 31.8 (L) %      MCV 90.9 fL      MCH 28.3 pg      MCHC 31.1 g/dL      RDW 16.5 (H) %      RDW-SD 53.7 fl      MPV 12.3 (H) fL      Platelets 65 (L) 10*3/mm3      Neutrophil % 88.3 (H) %      Lymphocyte % 3.7 (L) %      Monocyte % 6.7 %      Eosinophil % 0.0 %      Basophil % 0.1 %      Immature Grans % 1.2 (H) %      Neutrophils, Absolute 7.10 (H) 10*3/mm3      Lymphocytes, Absolute 0.30 (L) 10*3/mm3      Monocytes, Absolute 0.54 10*3/mm3      Eosinophils, Absolute 0.00 10*3/mm3      Basophils, Absolute 0.01 10*3/mm3      Immature Grans, Absolute 0.10 (H) 10*3/mm3      nRBC 0.0 /100 WBC            Radiology:    Imaging Results (last 24 hours)     ** No results found for the last 24 hours. **          Medication Review:       acetylcysteine 4 mL Nebulization Q6H - RT   aspirin 81 mg Oral Daily   atorvastatin 40 mg Oral Nightly   azithromycin 500 mg Intravenous Q24H   enoxaparin 40 mg Subcutaneous Daily   guaiFENesin 600 mg Oral Q12H   ipratropium-albuterol 3 mL Nebulization Q6H - RT   iron sucrose (VENOFER) IVPB 100 mg Intravenous Q24H   midodrine 5 mg Oral TID AC   multivitamin with minerals 1 tablet Oral  Daily   nystatin 5 mL Oral 4x Daily   pantoprazole 40 mg Oral Q AM   phosphorus 250 mg Oral 4x Daily   piperacillin-tazobactam 4.5 g Intravenous Q8H   sodium chloride 250 mL Intravenous Once         dextrose 75 mL/hr Last Rate: 75 mL/hr (10/14/17 2586)       Assessment/Plan     Problem List Items Addressed This Visit     Sepsis, unspecified organism    Relevant Medications    levoFLOXacin (LEVAQUIN) 500 MG tablet    * (Principal)Acute metabolic encephalopathy - Primary    Cachexia          Sepsis, present on admission, unknown source or organism                        With suspected pneumonia and infection, hypothermia, heart rate greater than 90, elevated lactic acid  Suspect pneumonia, not seen with dehydration, present on admission   Acute hypoxia   Acute metabolic encephalopathy  Abnormal gait with fall at home  Failure to thrive with cachexia and malnutrition consider related to dementia with poor oral intake vs possible malignancy  Suspect chronic dementia  Sacral decubitus ulcer, present on admission  Hypoalbuminemia  Recent scalp laceration after fall at home  Bilateral ecchymoses knees, from crawling on knees at home with left knee abrasion  Very poor hygeine, with severe malnutrition  Elevated BNP  Elevated Lactic Acid  Dehydration  Suspected COPD  Anemia likely of chronic disease, with some iron deficiency  Staph epidermidis bacteremia last month, left AMA  History of rib fracture  Ejection fraction of 25% with global hypokinesis, suspect ischemic cardiomyopathy  Hypernatremia  Acute hypoxic respiratory failure  Thrombocytopenia    Plan:    Continue with antibiotics.  Discontinue IV fluids given that he has a history of cardiomyopathy.  Discontinue Lovenox due to thrombocytopenia PT and OT have been consulted.  We'll transfer him to telemetry.  Place him on some low-dose midodrine given he has hypotension.  Check lab work in the a.m.  Patient unable to tolerate medications for cardiomyopathy.  Blood  pressure medications have been discontinued.  Continue with Zosyn and Zithromax for now.  Cultures have been negative.  His management is following this case.  Hopefully he will be able to return home in the near future.    Lenny Darnell DO  10/14/17  11:35 AM

## 2017-10-15 NOTE — PROGRESS NOTES
[facility]    Name:  Master Hall   Age:  77 y.o.  Sex:  male  :  1940  MRN:  [id number]   Visit Number:  [id number]  Admission Date:  10/10/2017  Date Of Service:  10/15/17  Primary Care Physician:  No Known Provider     LOS: 4 days :  Patient Care Team:  No Known Provider as PCP - General:    History taken from:     patient    Chief Complaint:      Weakness    Subjective     Interval History:     Patient seen again today.  He was admitted for sepsis, right-sided pneumonia, acute hypoxic respiratory failure, and severe malnutrition with cachexia.  He also has systolic cardiomyopathy with ejection fraction 25%.  Not in CHF at present time.  He has had low blood pressure, precluding the use of medications for cardiomyopathy.  He is actually on midodrine at the present time.  PT and OT are working with him.  He seems to be doing better, however he is very confused about his history.  Suspect that he has dementia.  He also is not taking care of himself well as he has cachexia and muscle wasting.  Suspect that he cannot take care of himself.  He denies any chest pressure, shortness breath, nausea, vomiting, or pain at present.  He continues to eat better here.  Blood cultures have had no growth.  He is off oxygen now.  He appears to be improving.    Review of Systems:     All systems were reviewed and negative     Objective     Vital Signs:    Temp:  [96.2 °F (35.7 °C)-98 °F (36.7 °C)] 97.6 °F (36.4 °C)  Heart Rate:  [] 93  Resp:  [16-19] 17  BP: ()/(66-87) 102/66    Physical Exam:      General Appearance:    Alert, cooperative, in no acute distress, Cachectic    Head:    Normocephalic, without obvious abnormality, atraumatic   Eyes:            Lids and lashes normal, conjunctivae and sclerae normal, no   icterus, no pallor, corneas clear, PERRLA   Ears:    Ears appear intact with no abnormalities noted   Throat:   No oral lesions, no thrush, oral  mucosa moist   Neck:   No adenopathy, supple, trachea midline, no thyromegaly, no   carotid bruit, no JVD   Back:     No kyphosis present, no scoliosis present, no skin lesions,      erythema or scars, no tenderness to percussion or                   palpation,   range of motion normal   Lungs:     Clear to auscultation,respirations regular, even and                  unlabored    Heart:    Regular rhythm and normal rate, normal S1 and S2, no            murmur, no gallop, no rub, no click   Chest Wall:    No abnormalities observed   Abdomen:     Normal bowel sounds, no masses, no organomegaly, soft        non-tender, non-distended, no guarding, no rebound                tenderness   Rectal:     Deferred   Extremities:   4/5 strength in upper and lower extremity is bilaterally.  Muscle wasting noted.     Pulses:   Pulses palpable and equal bilaterally   Skin:   No bleeding, bruising or rash   Lymph nodes:   No palpable adenopathy   Neurologic:   Cranial nerves 2 - 12 grossly intact, sensation intact, DTR       present and equal bilaterally          Results Review:      I reviewed the patient's new clinical results.    Labs:  Lab Results (last 24 hours)     Procedure Component Value Units Date/Time    Blood Culture - Blood, [[id number]]  (Normal) Collected:  10/10/17 1814    Specimen:  Blood from Arm, Right Updated:  10/14/17 1831     Blood Culture No growth at 4 days    Blood Culture - Blood, [[id number]]  (Normal) Collected:  10/10/17 1819    Specimen:  Blood from Arm, Left Updated:  10/14/17 1831     Blood Culture No growth at 4 days    Vitamin B12 [[id number]] Collected:  10/15/17 0616    Specimen:  Blood Updated:  10/15/17 0645    Folate [[id number]] Collected:  10/15/17 0616    Specimen:  Blood Updated:  10/15/17 0645    Magnesium [[id number]]  (Normal) Collected:  10/15/17 0616    Specimen:  Blood Updated:  10/15/17 0705     Magnesium 2.0 mg/dL     CBC & Differential [[id number]] Collected:  10/15/17 0616    Specimen:   Blood Updated:  10/15/17 0710    Narrative:       The following orders were created for panel order CBC & Differential.  Procedure                               Abnormality         Status                     ---------                               -----------         ------                     CBC Auto Differential[[id number]]        Abnormal            Final result                 Please view results for these tests on the individual orders.    CBC Auto Differential [[id number]]  (Abnormal) Collected:  10/15/17 0616    Specimen:  Blood Updated:  10/15/17 0710     WBC 10.69 10*3/mm3      RBC 4.16 (L) 10*6/mm3      Hemoglobin 11.7 (L) g/dL      Hematocrit 37.0 (L) %      MCV 88.9 fL      MCH 28.1 pg      MCHC 31.6 g/dL      RDW 16.0 (H) %      RDW-SD 51.6 fl      MPV 11.8 fL      Platelets 69 (L) 10*3/mm3      Neutrophil % 88.1 (H) %      Lymphocyte % 2.5 (L) %      Monocyte % 7.5 %      Eosinophil % 0.0 %      Basophil % 0.2 %      Immature Grans % 1.7 (H) %      Neutrophils, Absolute 9.42 (H) 10*3/mm3      Lymphocytes, Absolute 0.27 (L) 10*3/mm3      Monocytes, Absolute 0.80 10*3/mm3      Eosinophils, Absolute 0.00 10*3/mm3      Basophils, Absolute 0.02 10*3/mm3      Immature Grans, Absolute 0.18 (H) 10*3/mm3      nRBC 0.0 /100 WBC     Renal Function Panel [[id number]]  (Abnormal) Collected:  10/15/17 0616    Specimen:  Blood Updated:  10/15/17 0717     Glucose 75 mg/dL      BUN 21 (H) mg/dL      Creatinine 0.70 mg/dL      Sodium 142 mmol/L      Potassium 3.2 (L) mmol/L      Chloride 106 mmol/L      CO2 28.0 mmol/L      Calcium 9.1 mg/dL      Albumin 2.70 (L) g/dL      Phosphorus 2.8 mg/dL      Anion Gap 11.2 mmol/L      BUN/Creatinine Ratio 30.0 (H)     eGFR Non African Amer 109 mL/min/1.73     Narrative:       The MDRD GFR formula is only valid for adults with stable renal function between ages 18 and 70.           Radiology:    Imaging Results (last 24 hours)     ** No results found for the last 24 hours. **           Medication Review:       aspirin 81 mg Oral Daily   atorvastatin 40 mg Oral Nightly   guaiFENesin 600 mg Oral Q12H   ipratropium-albuterol 3 mL Nebulization Q6H - RT   iron sucrose (VENOFER) IVPB 100 mg Intravenous Q24H   midodrine 5 mg Oral TID AC   multivitamin with minerals 1 tablet Oral Daily   nystatin 5 mL Oral 4x Daily   pantoprazole 40 mg Oral Q AM   phosphorus 250 mg Oral 4x Daily   piperacillin-tazobactam 4.5 g Intravenous Q8H   potassium chloride 40 mEq Oral Daily            Assessment/Plan     Problem List Items Addressed This Visit     Sepsis, unspecified organism    Relevant Medications    levoFLOXacin (LEVAQUIN) 500 MG tablet    * (Principal)Acute metabolic encephalopathy - Primary    Cachexia          Sepsis, present on admission,Resolved  Right lower lobe pneumonia pneumonia,  present on admission   Acute hypoxic respiratory failure, resolved  Acute metabolic encephalopathy, resolved  Abnormal gait with fall at home  Failure to thrive with cachexia and malnutrition consider related to dementia with poor oral intake vs possible malignancy  Suspect chronic dementia  Sacral decubitus ulcer, present on admission  Hypoalbuminemia  Recent scalp laceration after fall at home  Bilateral ecchymoses knees, from crawling on knees at home with left knee abrasion  Very poor hygeine, with severe malnutrition  Dehydration  Suspected COPD  Anemia likely of chronic disease, with some iron deficiency.  Fecal occult blood testing was negative  Staph epidermidis bacteremia last month, left AMA  History of rib fracture  Ejection fraction of 25% with global hypokinesis, suspect ischemic cardiomyopathy  Hypernatremia, resolved  Thrombocytopenia    Plan:    Patient is cachectic, malnourished, and really has unknown home situation.  Doubt he can take care of himself adequately.   is working on placement.  He may need guardianship.  He claims to have siblings, but no one has visited him here.  We'll check  lab work in the a.m.  And continue to monitor.  Continue with PT and OT.  Continue with current medication regimen.  Further recommendations will depend on clinical course.    Lenny Darnell DO  10/15/17  1:20 PM

## 2017-10-15 NOTE — PLAN OF CARE
Problem: Patient Care Overview (Adult)  Goal: Plan of Care Review  Outcome: Ongoing (interventions implemented as appropriate)    10/15/17 0348   Coping/Psychosocial Response Interventions   Plan Of Care Reviewed With patient   Patient Care Overview   Progress improving   Outcome Evaluation   Outcome Summary/Follow up Plan Patient no longer in ICU. O2 sats mid to upper 90's on room air.         10/15/17 0348   Coping/Psychosocial Response Interventions   Plan Of Care Reviewed With patient   Patient Care Overview   Progress improving   Outcome Evaluation   Outcome Summary/Follow up Plan Patient no longer in ICU. O2 sats mid to upper 90's on room air. Confusion improving.       Goal: Adult Individualization and Mutuality  Outcome: Ongoing (interventions implemented as appropriate)    10/13/17 2000 10/15/17 0348   Individualization   Patient Specific Preferences likes watch tv --    Patient Specific Interventions --  Bed alarm on at all times.       Goal: Discharge Needs Assessment  Outcome: Ongoing (interventions implemented as appropriate)    Problem: Nutrition, Imbalanced: Inadequate Oral Intake (Adult)  Goal: Identify Related Risk Factors and Signs and Symptoms  Outcome: Outcome(s) achieved Date Met:  10/15/17    10/15/17 0348   Nutrition, Imbalanced: Inadequate Oral Intake   Nutrition Imbalanced: Less than Body Requirements: Related Risk Factors chronic illness/infection;appetite decreased;functional disability;knowledge deficit   Signs and Symptoms (Nutrition Imbalance, Inadequate Oral Intake: Signs and Symptoms) diarrhea/malabsorption;functional impairment;lab values (prealbumin, transferrin, C-reactive protein);loss of subcutaneous fat;weakness/lethargy       Goal: Improved Oral Intake  Outcome: Ongoing (interventions implemented as appropriate)    10/15/17 0348   Nutrition, Imbalanced: Inadequate Oral Intake (Adult)   Improved Oral Intake making progress toward outcome       Goal: Prevent Further Weight  Loss  Outcome: Ongoing (interventions implemented as appropriate)    10/15/17 0348   Nutrition, Imbalanced: Inadequate Oral Intake (Adult)   Prevent Further Weight Loss making progress toward outcome         Problem: Fall Risk (Adult)  Goal: Identify Related Risk Factors and Signs and Symptoms  Outcome: Outcome(s) achieved Date Met:  10/15/17    10/15/17 0348   Fall Risk   Fall Risk: Related Risk Factors age-related changes;confusion/agitation;fatigue/slow reaction;gait/mobility problems;history of falls;environment unfamiliar   Fall Risk: Signs and Symptoms presence of risk factors       Goal: Absence of Falls  Outcome: Ongoing (interventions implemented as appropriate)    10/15/17 0348   Fall Risk (Adult)   Absence of Falls making progress toward outcome         Problem: Pressure Ulcer (Adult)  Goal: Signs and Symptoms of Listed Potential Problems Will be Absent or Manageable (Pressure Ulcer)  Outcome: Ongoing (interventions implemented as appropriate)    10/15/17 0348   Pressure Ulcer   Problems Assessed (Pressure Ulcer) all   Problems Present (Pressure Ulcer) none         Problem: Confusion, Acute (Adult)  Goal: Identify Related Risk Factors and Signs and Symptoms  Outcome: Outcome(s) achieved Date Met:  10/15/17    10/15/17 0348   Confusion, Acute   Related Risk Factors (Acute Confusion) infection   Signs and Symptoms (Acute Confusion) disorientation       Goal: Cognitive/Functional Impairments Minimized  Outcome: Ongoing (interventions implemented as appropriate)    10/15/17 0348   Confusion, Acute (Adult)   Cognitive/Functional Impairments Minimized making progress toward outcome       Goal: Safety  Outcome: Ongoing (interventions implemented as appropriate)    10/15/17 0348   Confusion, Acute (Adult)   Safety making progress toward outcome

## 2017-10-16 PROBLEM — A41.9 SEPSIS, UNSPECIFIED ORGANISM (HCC): Status: RESOLVED | Noted: 2017-01-01 | Resolved: 2017-01-01

## 2017-10-16 PROBLEM — T68.XXXA HYPOTHERMIA: Status: RESOLVED | Noted: 2017-01-01 | Resolved: 2017-01-01

## 2017-10-16 PROBLEM — R62.7 FAILURE TO THRIVE IN ADULT: Status: ACTIVE | Noted: 2017-01-01

## 2017-10-16 PROBLEM — M94.0 COSTOCHONDRITIS: Status: ACTIVE | Noted: 2017-01-01

## 2017-10-16 PROBLEM — E87.6 HYPOKALEMIA: Status: ACTIVE | Noted: 2017-01-01

## 2017-10-16 NOTE — DISCHARGE PLACEMENT REQUEST
"Master Hall (77 y.o. Male)     Date of Birth Social Security Number Address Home Phone MRN    1940  [address] [place] [address] [phone] [id number]    Roman Catholic Marital Status          Non-Gnosticism Unknown       Admission Date Admission Type Admitting Provider Attending Provider Department, Room/Bed    10/10/17 Emergency Lenny Darnell DO Devers, Dustin K, DO [facility] MED SURG  3, 305/1    Discharge Date Discharge Disposition Discharge Destination                      Attending Provider: Saji Huertas DO     Allergies:  No Known Allergies    Isolation:  None   Infection:  None   Code Status:  FULL    Ht:  74\" (188 cm)   Wt:  102 lb 1.6 oz (46.3 kg)    Admission Cmt:  None   Principal Problem:  Right lower lobe pneumonia [J18.1] More...                 Active Insurance as of 10/10/2017     Primary Coverage     Payor Plan Insurance Group Employer/Plan Group    MEDICARE MEDICARE A & B      Payor Plan Address Payor Plan Phone Number Effective From Effective To    [address] [phone] [address]     [place] [address]       Subscriber Name Subscriber Birth Date Member ID       MASTER HALL 1940 [id number]                 Emergency Contacts      (Rel.) Home Phone Work Phone Mobile Phone    [employer] [phone] -- --            Insurance Information                MEDICARE/MEDICARE A & B Phone: [phone]    Subscriber: Master Hall Subscriber#: [id number]    Group#:  Precert#:              History & Physical      Zandra Everett DO at 10/11/2017  1:14 AM              [facility] HOSPITALIST   HISTORY AND PHYSICAL      Name:  Master Hall   Age:  76 y.o.  Sex:  male  :  1940  MRN:  [id number]   Visit Number:  [id number]  Admission Date:  10/10/2017  Date Of Service:  10/11/17  Primary Care Physician:  No Known Provider    Chief Complaint: altered mental status      History Of Presenting Illness:  Patient is a " cachectic chronically ill appearing  gentleman, appearing unkept with poor hygeine, who presented to ER via EMS with altered mental status. The patient reportedly lives alone, without family or friend to assist in his care but he does care for a dog and cat at home. EMS reported patient found lying in bed, covered in his own urine and feces. IT is unknown how long he had been lying there. Patient presented with core temp 95.8. He had intermittent confusion and difficulty answering questions. He is alert to self only.    In ER, labs obtained with elevated WBC, hypothermia less than 96. He was warmed with blankets. He was placed on 2 liter nasal cannula with oxygen saturation 92%. Urinalysis negative for infection. He remained incontinent of urine and states he also stools on himself. He was unsure if it was confusion or other, but reports over a month duration of this. Chest xray showed chronic intersitial changes preliminary read. CT head with moderate atrophy without acute abnormality. He was provided 1 liter normal saline. His mentation did not improves, as he remained further confused. Considered sepsis. Hospitalist admitted for further evaluation and workup and treatment.     Later after warmed to 97 axillary temp, patient reports having intermittent cough, pain in chest unable to describe with shortness of breath, worse over the past 1-1.5 months. He admits to feeling so weak, he has been unable to walk safely and had passed out. Last month, patient had been seen in ER with syncope, head laceration with staples, found to have pneumonia on 5 liter nasal cannula and signed out AMA but was prescribed albuterol and levaquin. Further details could not be obtained from patient.       Review Of Systems:     General ROS: Patient denies any fevers, chills or loss of consciousness. Complains of generalized weakness.   Psychological ROS: Denies any hallucinations and delusions.  Ophthalmic ROS: Denies any  "diplopia or transient loss of vision.  ENT ROS: Denies sore throat, nasal congestion or ear pain.   Allergy and Immunology ROS: Denies rash or itching.  Hematological and Lymphatic ROS: Denies neck swelling or easy bleeding.  Endocrine ROS: Denies any recent unintentional weight gain or loss.  Breast ROS: Denies any pain or swelling.  Respiratory ROS: Positive cough with  shortness of breath.   Cardiovascular ROS: Denies chest pain or palpitations. No history of exertional chest pain.  Gastrointestinal ROS: Denies nausea and vomiting. Denies any abdominal pain. No diarrhea.  Genito-Urinary ROS: Denies dysuria or hematuria.  Musculoskeletal ROS: Denies back pain. No muscle pain. No calf pain.   Neurological ROS: Denies any focal weakness. No loss of consciousness. Denies any numbness. Denies neck pain.   Dermatological ROS: Denies any redness or pruritis.       Past Medical History:  History of rib fractures  Suspected COPD    History reviewed. No pertinent past medical history.    Past Surgical history:    History reviewed. No pertinent surgical history.    Social History:  Pediatric History   Patient Guardian Status   • Not on file.     Other Topics Concern   • Not on file     Social History Narrative    . Lives alone. Neighbor called EMS because patient hadn't been seen in a few days. Patient has declined since his wife had passed away.     Patient reports history of smoking. Denies current smoking, alcohol, or drugs. He reported prior recent past was driving still and had been able to get groceries until \"I ran out of gas\".       Family History:    History reviewed. No pertinent family history.    Allergies:      Review of patient's allergies indicates no known allergies.    Home Medications:    Prior to Admission Medications     Prescriptions Last Dose Informant Patient Reported? Taking?    albuterol (PROVENTIL HFA;VENTOLIN HFA) 108 (90 Base) MCG/ACT inhaler   No No    Inhale 2 puffs Every 4 (Four) Hours " As Needed for Wheezing.    levoFLOXacin (LEVAQUIN) 500 MG tablet   No No    Take 1 tablet by mouth Daily.             ED Medications:    Medications   sodium chloride 0.9 % flush 10 mL (not administered)   vancomycin in dextrose 5% 150 mL (VANCOCIN) IVPB 750 mg (not administered)     And   Pharmacy to dose vancomycin (not administered)   ipratropium-albuterol (DUO-NEB) nebulizer solution 3 mL (not administered)   ipratropium-albuterol (DUO-NEB) nebulizer solution 3 mL (not administered)   dextrose 5 % and sodium chloride 0.9 % infusion (not administered)   piperacillin-tazobactam (ZOSYN) 4.5 g in iso-osmotic dextrose 100 mL IVPB (premix) (not administered)   piperacillin-tazobactam (ZOSYN) 4.5 g in iso-osmotic dextrose 100 mL IVPB (premix) (not administered)   lactated ringers infusion 1,000 mL (0 mL Intravenous Stopped 10/10/17 2119)   sodium chloride 0.9 % bolus 1,000 mL (1,000 mL Intravenous Handoff 10/11/17 0054)       Vital Signs:    Temp:  [95.8 °F (35.4 °C)-96.7 °F (35.9 °C)] 96.7 °F (35.9 °C)  Heart Rate:  [] 79  Resp:  [16-18] 18  BP: (100-133)/() 102/65      Intake/Output Summary (Last 24 hours) at 10/11/17 0252  Last data filed at 10/10/17 2119   Gross per 24 hour   Intake             1000 ml   Output                0 ml   Net             1000 ml       Last 3 weights    10/10/17  1717   Weight: 95 lb (43.1 kg)       Body mass index is 12.2 kg/(m^2).    Physical Exam:      General Appearance:    Alert to person. Mumbling. Fairly cooperative, no acute distress, oriented  To self only. Chronically ill and severely cachectic appearing. Hair unclean, unkept   Head:    Atraumatic and normocephalic, without obvious defect.   Eyes:            PERRLA, conjunctivae and sclerae normal, no icterus. No pallor. Extra-occular movements are within normal limits.   Ears:    Ears appear intact with no abnormalities noted.   Throat:   No oral lesions, no thrush, oral mucosa dry   Neck:   Supple, trachea midline,  no thyromegaly, no carotid bruit.   Back:      No pain. No tenderness to palpation,   range of motion normal.   Lungs:     Chest shape is normal. Breath sounds heard bilaterally equally reduced.  No crackles or wheezing. No Pleural rub or bronchial breathing.      Heart:    Normal S1 and S2, no murmur, no gallop   Abdomen:     Normal bowel sounds, no masses, no organomegaly. Soft        non-tender, non-distended, no guarding, no rebound                tenderness   Extremities:   Moves all extremities well but slowly, bilateral ankle pitting edema without lower leg edema,  no cyanosis, noclubbing   Pulses:   Pulses palpable and equal bilaterally   Skin:   No bleeding, bruising or rash   Lymph nodes:   No palpable adenopathy   Neurologic:   Cranial nerves 2 - 12 grossly intact, sensation intact, Motor power is normal and equal bilaterally.       EKG:    Sinus no st elevation     Labs:    Lab Results (last 24 hours)     Procedure Component Value Units Date/Time    Blood Gas, Arterial With Co-Ox [[id number]]  (Abnormal) Collected:  10/10/17 1813    Specimen:  Arterial Blood Updated:  10/10/17 1815     Site Arterial: left brachial     Ramon's Test N/A     pH, Arterial 7.514 (C) pH units      pCO2, Arterial 29.2 (C) mm Hg      pO2, Arterial 81.4 mm Hg      HCO3, Arterial 23.5 mmol/L      Base Excess, Arterial 0.6 mmol/L      O2 Saturation, Arterial 96.4 %      Hemoglobin, Blood Gas 12.6 g/dL      Oxyhemoglobin 94.9 %      Methemoglobin 0.80 %      Carboxyhemoglobin 0.8 %      Modality Cannula - Nasal     FIO2 28 %     Lipase [[id number]]  (Normal) Collected:  10/10/17 1726    Specimen:  Blood Updated:  10/10/17 1821     Lipase 70 U/L     CK [[id number]]  (Normal) Collected:  10/10/17 1726    Specimen:  Blood Updated:  10/10/17 1821     Creatine Kinase 35 U/L     Narrative:       The relative CKMB index is statistically unreliable if the CK is < or equal to 40 U/L.    Troponin [[id number]]  (Normal) Collected:  10/10/17 1726     Specimen:  Blood Updated:  10/10/17 1822     Troponin I 0.018 ng/mL     Comprehensive Metabolic Panel [[id number]]  (Abnormal) Collected:  10/10/17 1726    Specimen:  Blood Updated:  10/10/17 1822     Glucose 119 (H) mg/dL      BUN 51 (H) mg/dL      Creatinine 1.00 mg/dL      Sodium 140 mmol/L      Potassium 4.5 mmol/L      Chloride 104 mmol/L      CO2 24.0 (L) mmol/L      Calcium 9.5 mg/dL      Total Protein 6.4 g/dL      Albumin 3.10 (L) g/dL      ALT (SGPT) 37 U/L      AST (SGOT) 26 U/L      Alkaline Phosphatase 147 (H) U/L      Total Bilirubin 0.9 mg/dL      eGFR Non African Amer 73 mL/min/1.73      Globulin 3.3 gm/dL      A/G Ratio 0.9 (L) g/dL      BUN/Creatinine Ratio 51.0 (H)     Anion Gap 16.5 mmol/L     Narrative:       The MDRD GFR formula is only valid for adults with stable renal function between ages 18 and 70.    BNP [[id number]]  (Abnormal) Collected:  10/10/17 1726    Specimen:  Blood Updated:  10/10/17 1824     proBNP 1120.0 (C) pg/mL     Blood Culture - Blood, [[id number]] Collected:  10/10/17 1814    Specimen:  Blood from Arm, Right Updated:  10/10/17 1826    Blood Culture - Blood, [[id number]] Collected:  10/10/17 1819    Specimen:  Blood from Arm, Left Updated:  10/10/17 1827    Protime-INR [[id number]]  (Abnormal) Collected:  10/10/17 1726    Specimen:  Blood Updated:  10/10/17 1830     Protime 17.3 (H) Seconds      INR 1.58 (H)    aPTT [[id number]]  (Abnormal) Collected:  10/10/17 1726    Specimen:  Blood Updated:  10/10/17 1830     PTT 21.4 (L) seconds     Light Blue Top [[id number]] Collected:  10/10/17 1726    Specimen:  Blood Updated:  10/10/17 1831     Extra Tube hold for add-on      Auto resulted       Lavender Top [[id number]] Collected:  10/10/17 1726    Specimen:  Blood Updated:  10/10/17 1831     Extra Tube hold for add-on      Auto resulted       Gold Top - SST [[id number]] Collected:  10/10/17 1726    Specimen:  Blood Updated:  10/10/17 1831     Extra Tube Hold for add-ons.      Auto  resulted.       Green Top (No Gel) [[id number]] Collected:  10/10/17 1726    Specimen:  Blood Updated:  10/10/17 1831     Extra Tube Hold for add-ons.      Auto resulted.       [place] Draw [[id number]] Collected:  10/10/17 1726    Specimen:  Blood Updated:  10/10/17 1831    Narrative:       The following orders were created for panel order [place] Draw.  Procedure                               Abnormality         Status                     ---------                               -----------         ------                     Light Blue Top[[id number]]                                   Final result               Lavender Top[[id number]]                                     Final result               Gold Top - SST[[id number]]                                   Final result               Green Top (No Gel)[[id number]]                               Final result                 Please view results for these tests on the individual orders.    CBC & Differential [[id number]] Collected:  10/10/17 1726    Specimen:  Blood Updated:  10/10/17 1839    Narrative:       The following orders were created for panel order CBC & Differential.  Procedure                               Abnormality         Status                     ---------                               -----------         ------                     CBC Auto Differential[[id number]]        Abnormal            Final result                 Please view results for these tests on the individual orders.    CBC Auto Differential [[id number]]  (Abnormal) Collected:  10/10/17 1726    Specimen:  Blood Updated:  10/10/17 1839     WBC 11.98 (H) 10*3/mm3      RBC 4.37 (L) 10*6/mm3      Hemoglobin 12.3 (L) g/dL      Hematocrit 38.4 (L) %      MCV 87.9 fL      MCH 28.1 pg      MCHC 32.0 g/dL      RDW 16.1 (H) %      RDW-SD 50.2 fl      MPV 12.9 (H) fL      Platelets 102 (L) 10*3/mm3      Neutrophil % 91.0 (H) %      Lymphocyte % 1.9 (L) %      Monocyte % 6.5 %      Eosinophil % 0.0 %       Basophil % 0.1 %      Immature Grans % 0.5 %      Neutrophils, Absolute 10.90 (H) 10*3/mm3      Lymphocytes, Absolute 0.23 (L) 10*3/mm3      Monocytes, Absolute 0.78 10*3/mm3      Eosinophils, Absolute 0.00 10*3/mm3      Basophils, Absolute 0.01 10*3/mm3      Immature Grans, Absolute 0.06 10*3/mm3      nRBC 0.0 /100 WBC     Salicylate Level [[id number]]  (Abnormal) Collected:  10/10/17 1726    Specimen:  Blood Updated:  10/10/17 1851     Salicylate <1.0 (L) mg/dL     TSH [[id number]]  (Normal) Collected:  10/10/17 1726    Specimen:  Blood Updated:  10/10/17 1851     TSH 4.240 mIU/mL     Acetaminophen Level [[id number]]  (Normal) Collected:  10/10/17 1726    Specimen:  Blood Updated:  10/10/17 1851     Acetaminophen <10.0 mcg/mL     Narrative:       Toxic = Greater than 150 mcg/mL    Lactic Acid, Plasma [[id number]]  (Abnormal) Collected:  10/10/17 1905    Specimen:  Blood Updated:  10/10/17 1921     Lactate 2.6 (C) mmol/L     Lactic Acid, Plasma [[id number]] Collected:  10/10/17 1905    Specimen:  Blood Updated:  10/10/17 2231     Extra Tube Hold for add-ons.      Auto resulted.       Urinalysis With / Culture If Indicated - Urine, Catheter [[id number]]  (Abnormal) Collected:  10/10/17 2314    Specimen:  Urine from Urine, Catheter Updated:  10/10/17 2343     Color, UA Yellow     Appearance, UA Slightly Cloudy (A)     pH, UA <=5.0     Specific Gravity, UA 1.025     Glucose, UA Negative     Ketones, UA Negative     Bilirubin, UA Negative     Blood, UA Small (1+) (A)     Protein, UA 30 mg/dL (1+) (A)     Leuk Esterase, UA Negative     Nitrite, UA Negative     Urobilinogen, UA 0.2 E.U./dL    Urinalysis, Microscopic Only - Urine, Clean Catch [[id number]]  (Abnormal) Collected:  10/10/17 2314    Specimen:  Urine from Urine, Catheter Updated:  10/10/17 2343     RBC, UA 6-12 (A) /HPF      WBC, UA None Seen /HPF      Bacteria, UA None Seen /HPF      Squamous Epithelial Cells, UA None Seen /HPF      Hyaline Casts, UA 3-6 /LPF       Uric Acid Crystals, UA Moderate/2+ /HPF      Methodology Manual Light Microscopy    Lactic Acid, Reflex [[id number]]  (Abnormal) Collected:  10/10/17 2314    Specimen:  Blood Updated:  10/10/17 2357     Lactate 2.2 (C) mmol/L           Radiology:    Imaging Results (last 72 hours)     Procedure Component Value Units Date/Time    CT Head Without Contrast [[id number]] Collected:  10/10/17 1852     Updated:  10/10/17 1854    Narrative:       FINAL REPORT    TECHNIQUE:  Axial images through the head was performed without contrast.This study was performed with techniques to keep radiation doses as low as reasonably achievable, (ALARA). Individualized dose reduction techniques using automated exposure control or   adjustment of mA and/or kV according to the patient's size were employed.    CLINICAL HISTORY:  AMS    FINDINGS:  There is moderate generalized atrophy. No abnormal density is seen.  Ventricles are normal.  There is no hemorrhage.  No mass effect is seen.    Bone windows show no evidence of fracture.      Impression:       No acute findings    Authenticated by YADI Pollard MD on 10/10/2017 06:52:04 PM    XR Chest 2 View [[id number]] Resulted:  10/10/17 1905     Updated:  10/10/17 1905          Assessment:  Active Problems:    Elevated lactic acid level    Acute encephalopathy    Hypothermia    Cachexia    Sepsis, present on admission, unknown source or organism   With suspected pneumonia and infection, hypothermia, heart rate greater than 90, elevated lactic acid  Suspect pneumonia, not seen with dehydration, present on admission   Acute hypoxia   Acute metabolic encephalopathy  Abnormal gait with fall at home  Failure to thrive with cachexia and malnutrition consider related to dementia with poor oral intake vs possible malignancy  Suspect chronic dementia  Sacral decubitus ulcer, present on admission  Hypoalbuminemia  Recent scalp laceration after fall at home  Bilateral ecchymoses knees, from crawling on  knees at home with left knee abrasion  Very poor hygeine  Elevated BNP  Elevated Lactic Acid  Dehydration  Suspected COPD  Anemia likely of chronic disease  Staph epidermidis bacteremia last month  History of rib fracture    Plan:    Admit to med surg with telemetry. He has received 2 liter normal saline bolus. Continue to hydrate with D5 Normal saline at 100 cc x 1 day. Oxygen provided. Duonebs ordered. Tylenol as needed for pain or fever. His temp has improved with the current therapies. Blood cultures have been obtained and pending. Lactic acid has improved. Check CT Chest for mass vs pneumonia and CT Lspine with abnormal gait, incontinence of bowel and bladder though over a month now. With cachexia, it would not be surprising to find a malignancy. Unfortunately the patient does not have family or friends to assist in gaining further details.     Last month labs showed culture blood positive for staph epidermidis. The patient had signed out AMA and provided levaquin, which was listed as appropriate antibiotic therapy but it is not known at this time whether he filled or took the medication. If he did not, would consider 2D echo. Nursing may assist tomorrow, when pharmacy open, to get further details.     Anemia panel pending.No occult bleeding identified.     Wound care consult. . PT consult. Nutrition. Regular diet with added supplementation. Started multivitamin.     Expect patient will require  more than  2-3 days duration hospitalization for appropriate treatmen. Expect he will require nursing facility placement on discharge. Patient has history of signing out AMA but since patient intermittently now confused, he would not be able to sign himself out. He would also be a danger to himself if home alone.     Medication risks and benefits were discussed in detail. Patient reported being satisfied with current treatment plan.    Zandra Everett DO  10/11/17  2:52 AM         Electronically signed  by Zandra Everett DO at 10/11/2017  3:31 AM        Hospital Medications (active)       Dose Frequency Start End    acetaminophen (TYLENOL) tablet 650 mg 650 mg Every 4 Hours PRN 10/11/2017     Sig - Route: Take 2 tablets by mouth Every 4 (Four) Hours As Needed for Mild Pain . - Oral    aspirin EC tablet 81 mg 81 mg Daily 10/12/2017     Sig - Route: Take 1 tablet by mouth Daily. - Oral    atorvastatin (LIPITOR) tablet 40 mg 40 mg Nightly 10/12/2017     Sig - Route: Take 1 tablet by mouth Every Night. - Oral    benzonatate (TESSALON) capsule 200 mg 200 mg 3 Times Daily PRN 10/11/2017     Sig - Route: Take 2 capsules by mouth 3 (Three) Times a Day As Needed for Cough. - Oral    bisacodyl (DULCOLAX) suppository 10 mg 10 mg Daily PRN 10/11/2017     Sig - Route: Insert 1 suppository into the rectum Daily As Needed for Constipation. - Rectal    cefTRIAXone (ROCEPHIN) IVPB 1 g/50ml dextrose (premix) 1 g Every 24 Hours 10/16/2017     Sig - Route: Infuse 50 mL into a venous catheter Daily. - Intravenous    guaiFENesin (MUCINEX) 12 hr tablet 600 mg 600 mg Every 12 Hours Scheduled 10/11/2017     Sig - Route: Take 1 tablet by mouth Every 12 (Twelve) Hours. - Oral    ipratropium-albuterol (DUO-NEB) nebulizer solution 3 mL 3 mL Every 4 Hours PRN 10/11/2017     Sig - Route: Take 3 mL by nebulization Every 4 (Four) Hours As Needed for Wheezing or Shortness of Air. - Nebulization    ipratropium-albuterol (DUO-NEB) nebulizer solution 3 mL 3 mL Every 6 Hours - RT 10/11/2017     Sig - Route: Take 3 mL by nebulization Every 6 (Six) Hours. - Nebulization    iron sucrose (VENOFER) 100 mg in sodium chloride 0.9 % 100 mL IVPB 100 mg Every 24 Hours 10/11/2017 10/15/2017    Sig - Route: Infuse 100 mg into a venous catheter Daily. - Intravenous    ketorolac (TORADOL) injection 15 mg 15 mg Once 10/16/2017 10/16/2017    Sig - Route: Infuse 15 mg into a venous catheter 1 (One) Time. - Intravenous    midodrine (PROAMATINE) tablet 5 mg 5 mg 3 Times  "Daily Before Meals 10/14/2017     Sig - Route: Take 1 tablet by mouth 3 (Three) Times a Day Before Meals. - Oral    multivitamin with minerals 1 tablet 1 tablet Daily 10/11/2017     Sig - Route: Take 1 tablet by mouth Daily. - Oral    nystatin (MYCOSTATIN) 835720 UNIT/ML suspension 500,000 Units 5 mL 4 Times Daily 10/13/2017     Sig - Route: Take 5 mL by mouth 4 (Four) Times a Day. - Oral    ondansetron (ZOFRAN) injection 4 mg 4 mg Every 6 Hours PRN 10/11/2017     Sig - Route: Infuse 2 mL into a venous catheter Every 6 (Six) Hours As Needed for Nausea or Vomiting. - Intravenous    Linked Group 1:  \"Or\" Linked Group Details        ondansetron (ZOFRAN) tablet 4 mg 4 mg Every 6 Hours PRN 10/11/2017     Sig - Route: Take 1 tablet by mouth Every 6 (Six) Hours As Needed for Nausea or Vomiting. - Oral    Linked Group 1:  \"Or\" Linked Group Details        ondansetron ODT (ZOFRAN-ODT) disintegrating tablet 4 mg 4 mg Every 6 Hours PRN 10/11/2017     Sig - Route: Take 1 tablet by mouth Every 6 (Six) Hours As Needed for Nausea or Vomiting. - Oral    Linked Group 1:  \"Or\" Linked Group Details        pantoprazole (PROTONIX) EC tablet 40 mg 40 mg Every Early Morning 10/14/2017     Sig - Route: Take 1 tablet by mouth Every Morning. - Oral    phosphorus (K PHOS NEUTRAL) tablet 1 tablet 250 mg 4 Times Daily 10/14/2017     Sig - Route: Take 1 tablet by mouth 4 (Four) Times a Day. - Oral    potassium chloride (KLOR-CON) packet 40 mEq 40 mEq Daily 10/15/2017     Sig - Route: Take 40 mEq by mouth Daily. - Oral    sodium chloride 0.9 % flush 1-10 mL 1-10 mL As Needed 10/11/2017     Sig - Route: Infuse 1-10 mL into a venous catheter As Needed for Line Care. - Intravenous    sodium chloride 0.9 % flush 10 mL 10 mL As Needed 10/10/2017     Sig - Route: Infuse 10 mL into a venous catheter As Needed for Line Care. - Intravenous    Cosign for Ordering: Accepted by Dillan Morillo MD on 10/10/2017  6:49 PM    piperacillin-tazobactam (ZOSYN) " 4.5 g in iso-osmotic dextrose 100 mL IVPB (premix) (Discontinued) 4.5 g Every 8 Hours 10/11/2017 10/16/2017    Sig - Route: Infuse 100 mL into a venous catheter Every 8 (Eight) Hours. - Intravenous             Physician Progress Notes (last 24 hours) (Notes from 10/15/2017  2:09 PM through 10/16/2017  2:09 PM)      Saji Huertas, DO at 10/16/2017 11:05 AM  Version 1 of 1               [facility]    [facility]    Name:  Master Hall   Age:  77 y.o.  Sex:  male  :  1940  MRN:  [id number]   Visit Number:  [id number]  Admission Date:  10/10/2017  Date Of Service:  10/16/17  Primary Care Physician:  No Known Provider     LOS: 5 days :  Patient Care Team:  No Known Provider as PCP - General:    Chief Complaint:      CP/Failure to Thrive/Hypokalemia/CAP    Subjective / Interval History:     I have reviewed labs/imaging/records from this hospitalization, including ER staff and admitting/attending physicians H/P's and progress notes to establish a comprehensive understanding of this patient's clinical hospital course, as well as to establish a transition of care appropriately.    Pt is a 78 yo male, admitted secondary to RAUL/CP/ALMAZAN, with diagnosis of FTT/cacchexia and RLL pneumonia on initial eval, started on appropriate abx d/t underlying sepsis clinical picture; sepsis appears to have resolved, and cultures have returned negative; afebrile, but still has CP with palpation of chest/sternum, and has been present since a fall at a grocery store; denies hemoptysis; states duoneb tx's very helpful; has been eval by cardiology, but pt defers any further cardiac intervention or diagnostics at this time.    No N/V; good appetite, eating well; denies hematuria or dysuria; slept well; norbert shoulder arthralgias and sternal pain with movement and palpation; denies orthopnea.    Review of Systems:     General ROS: Patient denies any fevers, chills or loss of consciousness.  Respiratory ROS:  Denies cough or shortness of breath.  Cardiovascular ROS: Denies chest pain or palpitations. No history of exertional chest pain.  Gastrointestinal ROS: Denies nausea and vomiting. Denies any abdominal pain. No diarrhea.  Neurological ROS: Denies any focal weakness. No loss of consciousness. Denies any numbness. Denies neck pain.  Dermatological ROS: Denies any redness or pruritis.    Vital Signs:    Temp:  [97 °F (36.1 °C)-98.1 °F (36.7 °C)] 97.7 °F (36.5 °C)  Heart Rate:  [64-98] 89  Resp:  [16-20] 17  BP: ()/(63-89) 101/84    Intake and output:    I/O last 3 completed shifts:  In: 400 [IV Piggyback:400]  Out: 225 [Urine:225]  I/O this shift:  In: 480 [P.O.:480]  Out: -     Physical Examination:    General Appearance:  Alert and cooperative, not in any acute distress.  Mod ill-appearing, very thin build.   Head:  Atraumatic and normocephalic, without obvious abnormality.   Eyes:          PERRLA, conjunctivae and sclerae normal, no Icterus. No pallor. Extra-occular movements are within normal limits.   Neck: Supple, trachea midline, no thyromegaly, no carotid bruit.   Lungs:   Chest shape is normal. Breath sounds heard bilaterally equally.  Mod referred upp airway china, cleared with cough, trace end-exp wheezes norbert. No Pleural rub or bronchial breathing.   Heart:  Normal S1 and S2, no murmur, no gallop, no rub. No JVD. Focal tenderness to palpation of mid-lower sternum and costochondral junctions norbert.   Abdomen:   Normal bowel sounds, no masses, no organomegaly. Soft       non-tender, non-distended, no guarding, no rebound tenderness.   Extremities: Moves all extremities well, no edema, no cyanosis, no            Clubbing.  Thin, frail build.   Skin: No bleeding, bruising or rash.   Neurologic: Awake, alert and oriented times 3. Moves all 4 extremities equally.   Laboratory results:      Results from last 7 days  Lab Units 10/16/17  0600 10/15/17  0616 10/14/17  0427 10/13/17  0408  10/10/17  1726   SODIUM  mmol/L 141 142 144 150*  < > 140   POTASSIUM mmol/L 3.1* 3.2* 3.2* 3.0*  < > 4.5   CHLORIDE mmol/L 107 106 111* 115*  < > 104   CO2 mmol/L 27.0 28.0 28.0 28.0  < > 24.0*   BUN mg/dL 21* 21* 25* 31*  < > 51*   CREATININE mg/dL 0.70 0.70 0.70 0.80  < > 1.00   CALCIUM mg/dL 9.1 9.1 8.9 8.9  < > 9.5   BILIRUBIN mg/dL  --   --   --  0.4  --  0.9   ALK PHOS U/L  --   --   --  129*  --  147*   ALT (SGPT) U/L  --   --   --  31  --  37   AST (SGOT) U/L  --   --   --  35  --  26   GLUCOSE mg/dL 81 75 80 83  < > 119*   < > = values in this interval not displayed.    Results from last 7 days  Lab Units 10/16/17  0600 10/15/17  0616 10/14/17  0427   WBC 10*3/mm3 9.50 10.69 8.05   HEMOGLOBIN g/dL 10.9* 11.7* 9.9*   HEMATOCRIT % 33.7* 37.0* 31.8*   PLATELETS 10*3/mm3 71* 69* 65*       Results from last 7 days  Lab Units 10/10/17  1726   INR  1.58*       Results from last 7 days  Lab Units 10/12/17  0452 10/11/17  0528 10/10/17  1726   CK TOTAL U/L  --  46 35   TROPONIN I ng/mL 0.016 <0.012 0.018       Results from last 7 days  Lab Units 10/10/17  1819 10/10/17  1814   BLOODCX  No growth at 5 days No growth at 5 days           I have reviewed the patient's laboratory results.    Radiology results:    Imaging Results (last 24 hours)     ** No results found for the last 24 hours. **          I have reviewed the patient's radiology reports.    Medication Review:     I have reviewed the patients active and prn medications.         Assessment:  Principal Problem:    Right lower lobe pneumonia  Active Problems:    Acute metabolic encephalopathy    Hypokalemia    Cachexia    Costochondritis    Failure to thrive in adult        Plan:  Will consult PT/OT; discussed with pt at length the likelihood of underlying CAD, and he defers any intervention or further diagnostics/eval at this time; he verb understanding of risks of such, and accepts those risks; he has sig costochondritis, which will be treated with IV toradol x 1 now; will follow  clinically; his cultures have been negative; will de-escalate his abx, stop Zosyn and start rocephin IV; will cont with other resp meds/regimen at this time; PT/OT consulted for eval also; will continue to follow along with CM/SW for probable placement for SNF services.  Cont daily KCl supplementation, follow electrolytes daily; VSS, and on PPI; cont IS use; SCUDs to norbert LE.  No acute changes on EKG today; will repeat CXR.    I have spent a total of 35 mins in direct pt care today.    Saji Huertas DO  10/16/17  11:07 AM               Electronically signed by Saji Huertas DO at 10/16/2017 11:16 AM        Consult Notes (last 24 hours) (Notes from 10/15/2017  2:09 PM through 10/16/2017  2:09 PM)     No notes of this type exist for this encounter.           Physical Therapy Notes (last 24 hours) (Notes from 10/15/2017  2:09 PM through 10/16/2017  2:09 PM)      Ranjit Sun PTA at 10/16/2017 11:31 AM  Version 1 of 1         Problem: Patient Care Overview (Adult)  Goal: Plan of Care Review  Outcome: Ongoing (interventions implemented as appropriate)    10/16/17 1124   Coping/Psychosocial Response Interventions   Plan Of Care Reviewed With patient  (Nurse )   Patient Care Overview   Progress improving   Outcome Evaluation   Outcome Summary/Follow up Plan Pt was found supine in bed and willing to participate with therapy. Pt c/o generalized pain that was intermittant in nature. Pt was transferred from supine to sitting EOB. Pt reported chest pain and nursing was called. Pt was returned to supine and grasp his chest. that was when nurse called rapid response.          Problem: Inpatient Physical Therapy  Goal: Bed Mobility Goal LTG- PT  Outcome: Ongoing (interventions implemented as appropriate)    10/12/17 0920 10/16/17 1124   Bed Mobility PT LTG   Bed Mobility PT LTG, Date Established 10/12/17 --    Bed Mobility PT LTG, Time to Achieve 2 wks --    Bed Mobility PT LTG, Activity Type supine to sit/sit to supine  --    Bed Mobility PT LTG, [place] Level conditional independence --    Bed Mobility PT Goal LTG, Assist Device bed rails --    Bed Mobility PT LTG, Outcome --  goal ongoing              Electronically signed by Ranjit Sun PTA at 10/16/2017 11:31 AM      Ranjit Sun PTA at 10/16/2017 11:32 AM  Version 1 of 1         Acute Care - Physical Therapy Treatment Note  [facility]     Patient Name: Master Hall  : 1940  MRN: [id number]  Today's Date: 10/16/2017  Onset of Illness/Injury or Date of Surgery Date: 10/10/17  Date of Referral to PT: 10/11/17  Referring Physician: Dr. Darnell    Admit Date: 10/10/2017    Visit Dx:    ICD-10-CM ICD-9-CM   1. Acute encephalopathy G93.40 348.30   2. Elevated lactic acid level R79.89 276.2   3. Cachexia R64 799.4   4. Impaired functional mobility, balance, gait, and endurance Z74.09 V49.89   5. Impaired mobility and ADLs Z74.09 799.89     Patient Active Problem List   Diagnosis   • Acute metabolic encephalopathy   • Cachexia   • Right lower lobe pneumonia   • Hypokalemia   • Costochondritis   • Failure to thrive in adult               Adult Rehabilitation Note       10/16/17 1002 10/14/17 1100       Rehab Assessment/Intervention    Discipline physical therapy assistant  -RM physical therapy assistant  -CK     Document Type therapy note (daily note)  -RM      Subjective Information complains of;pain   generalized per pt report.    -RM      Patient Effort, Rehab Treatment fair  -RM      Treatment Not Performed  other (see comments)  -CK     Treatment Not Performed, Comment  Pt declines tx citing chest pain and fatigue  -CK     Symptoms Noted During/After Treatment increased pain   reports chest pain   -RM      Precautions/Limitations fall precautions  -RM      Recorded by [RM] Ranjit Sun PTA [CK] nAkita Cramer PTA     Vital Signs    Post Systolic BP Rehab 90  -RM      Post Treatment Diastolic BP 63  -RM      Posttreatment Heart Rate (beats/min) 97   -RM      Post SpO2 (%) 90  -RM      O2 Delivery Post Treatment supplemental O2   2 lpm pnc   -RM      Pre Patient Position Supine  -RM      Intra Patient Position Sitting  -RM      Post Patient Position Supine  -RM      Recorded by [] Ranjit Sun PTA      Pain Assessment    Pain Assessment 0-10  -RM      Pain Score 0  -RM      Post Pain Score unable to assess  -RM      Pain Location Chest  -RM      Pain Orientation Mid;Lower  -RM      Pain Frequency Intermittent  -RM      Pain Onset Gradual  -RM      Recorded by [] Ranjit Sun PTA      Cognitive Assessment/Intervention    Current Cognitive/Communication Assessment functional  -RM      Orientation Status oriented to;person;place  -RM      Follows Commands/Answers Questions 100% of the time;able to follow single-step instructions;needs cueing;needs increased time  -RM      Personal Safety decreased awareness, need for safety  -RM      Personal Safety Interventions fall prevention program maintained  -RM      Recorded by [RM] Ranjit Sun PTA      Bed Mobility, Assessment/Treatment    Bed Mobility, Assistive Device bed rails;head of bed elevated  -RM      Bed Mobility, Roll Right, [place] minimum assist (75% patient effort)  -RM      Bed Mob, Supine to Sit, [place] minimum assist (75% patient effort)  -RM      Bed Mob, Sit to Supine, [place] minimum assist (75% patient effort)  -RM      Recorded by [] Ranjit Sun PTA      Positioning and Restraints    Pre-Treatment Position in bed  -RM      Post Treatment Position bed  -RM      In Bed supine;notified nsg;with nsg  -RM      Recorded by [] Ranjit Sun PTA        User Key  (r) = Recorded By, (t) = Taken By, (c) = Cosigned By    Initials Name Effective Dates    CK Ankita Cramer, HARLEY 10/26/16 -     PILY Sun PTA 10/26/16 -                 IP PT Goals       10/16/17 1124 10/12/17 0920       Bed Mobility PT LTG    Bed Mobility PT LTG, Date Established  10/12/17   -LM     Bed Mobility PT LTG, Time to Achieve  2 wks  -LM     Bed Mobility PT LTG, Activity Type  supine to sit/sit to supine  -LM     Bed Mobility PT LTG, [place] Level  conditional independence  -LM     Bed Mobility PT Goal  LTG, Assist Device  bed rails  -LM     Bed Mobility PT LTG, Outcome goal ongoing  -RM goal ongoing  -LM     Transfer Training PT LTG    Transfer Training PT LTG, Date Established  10/12/17  -LM     Transfer Training PT LTG, Time to Achieve  2 wks  -LM     Transfer Training PT LTG, Activity Type  sit to stand/stand to sit;bed to chair /chair to bed  -LM     Transfer Training PT LTG, [place] Level  contact guard assist  -LM     Transfer Training PT LTG, Assist Device  walker, rolling  -LM     Transfer Training PT LTG, Outcome  goal ongoing  -LM     Gait Training PT LTG    Gait Training Goal PT LTG, Date Established  10/12/17  -LM     Gait Training Goal PT LTG, Time to Achieve  2 wks  -LM     Gait Training Goal PT LTG, [place] Level  contact guard assist  -LM     Gait Training Goal PT LTG, Assist Device  walker, rolling  -LM     Gait Training Goal PT LTG, Distance to Achieve  100  -LM     Gait Training Goal PT LTG, Outcome  goal ongoing  -LM     Strength Goal PT LTG    Strength Goal PT LTG, Date Established  10/12/17  -LM     Strength Goal PT LTG, Time to Achieve  2 wks  -LM     Strength Goal PT LTG, Measure to Achieve  Patient will perform B LE ther ex x 15 reps to improve functional strength for mobility.  -LM     Strength Goal PT LTG, Outcome  goal ongoing  -LM       User Key  (r) = Recorded By, (t) = Taken By, (c) = Cosigned By    Initials Name Provider Type    LM Citlalli Angela, PT Physical Therapist    RM Ranjit Sun, PTA Physical Therapy Assistant          Physical Therapy Education     Title: PT OT SLP Therapies (Active)     Topic: Physical Therapy (Done)     Point: Mobility training (Done)    Learning Progress Summary    Learner Readiness Method Response Comment  Documented by Status   Patient Acceptance E VU Purpose of PT/POC.  10/12/17 0920 Done               Point: Precautions (Done)    Learning Progress Summary    Learner Readiness Method Response Comment Documented by Status   Patient Acceptance E VU Purpose of PT/POC.  10/12/17 0920 Done                      User Key     Initials Effective Dates Name Provider Type Discipline     10/26/16 -  Citlalli Angela, PT Physical Therapist PT                    PT Recommendation and Plan  Anticipated Discharge Disposition: inpatient rehabilitation facility  Planned Therapy Interventions: balance training, bed mobility training, gait training, home exercise program, patient/family education, strengthening, transfer training  PT Frequency: daily  Plan of Care Review  Plan Of Care Reviewed With: patient (Nurse )  Progress: improving  Outcome Summary/Follow up Plan: Pt was found supine in bed and willing to participate with therapy.  Pt c/o generalized pain that was intermittant in nature. Pt was transferred from supine to sitting EOB. Pt reported chest pain  and nursing was called. Pt was returned to supine and grasp his chest. that was when nurse called rapid response.            Outcome Measures       10/16/17 1002          How much help from another person do you currently need...    Turning from your back to your side while in flat bed without using bedrails? 3  -RM      Moving from lying on back to sitting on the side of a flat bed without bedrails? 3  -RM      Moving to and from a bed to a chair (including a wheelchair)? 2  -RM      Standing up from a chair using your arms (e.g., wheelchair, bedside chair)? 2  -RM      Climbing 3-5 steps with a railing? 1  -RM      To walk in hospital room? 2  -RM      AM-PAC 6 Clicks Score 13  -RM      Functional Assessment    Outcome Measure Options AM-PAC 6 Clicks Basic Mobility (PT)  -RM        User Key  (r) = Recorded By, (t) = Taken By, (c) = Cosigned By    Initials Name Provider Type      Ranjit Sun PTA Physical Therapy Assistant           Time Calculation:         PT Charges       10/16/17 1132          Time Calculation    Start Time 1002  -RM      PT Received On 10/16/17  -RM      PT Goal Re-Cert Due Date 10/22/17  -RM      Time Calculation- PT    Total Timed Code Minutes- PT 27 minute(s)  -RM        User Key  (r) = Recorded By, (t) = Taken By, (c) = Cosigned By    Initials Name Provider Type     Ranjit Sun PTA Physical Therapy Assistant          Therapy Charges for Today     Code Description Service Date Service Provider Modifiers Qty    [id number]  PT THERAPEUTIC ACT EA 15 MIN 10/16/2017 Ranjit Sun PTA GP 2          PT G-Codes  Outcome Measure Options: AM-PAC 6 Clicks Basic Mobility (PT)    Ranjit Sun PTA  10/16/2017            Electronically signed by Ranjit Sun PTA at 10/16/2017 11:32 AM

## 2017-10-16 NOTE — PROGRESS NOTES
Continued Stay Note  [facility]     Patient Name: Master Hall  MRN: [id number]  Today's Date: 10/16/2017    Admit Date: 10/10/2017          Discharge Plan       10/16/17 1426    Case Management/Social Work Plan    Plan Spoke to patient who has two sisters but unsure where they are. Neighbor, Lanny here to assist with his bills. Provided POA and living will paperwork if needed. Faxed clinical to [facility]              Discharge Codes     None            Tiffany Elizabeth  Called to [facility], left message for Valentine Fagan in [facility]. Guardianship , Zohra alanis, has no record of patient.

## 2017-10-16 NOTE — PLAN OF CARE
Problem: Patient Care Overview (Adult)  Goal: Plan of Care Review  Outcome: Ongoing (interventions implemented as appropriate)    10/16/17 1124   Coping/Psychosocial Response Interventions   Plan Of Care Reviewed With patient  (Nurse )   Patient Care Overview   Progress improving   Outcome Evaluation   Outcome Summary/Follow up Plan Pt was found supine in bed and willing to participate with therapy. Pt c/o generalized pain that was intermittant in nature. Pt was transferred from supine to sitting EOB. Pt reported chest pain and nursing was called. Pt was returned to supine and grasp his chest. that was when nurse called rapid response.          Problem: Inpatient Physical Therapy  Goal: Bed Mobility Goal LTG- PT  Outcome: Ongoing (interventions implemented as appropriate)    10/12/17 0920 10/16/17 1124   Bed Mobility PT LTG   Bed Mobility PT LTG, Date Established 10/12/17 --    Bed Mobility PT LTG, Time to Achieve 2 wks --    Bed Mobility PT LTG, Activity Type supine to sit/sit to supine --    Bed Mobility PT LTG, [place] Level conditional independence --    Bed Mobility PT Goal LTG, Assist Device bed rails --    Bed Mobility PT LTG, Outcome --  goal ongoing

## 2017-10-16 NOTE — SIGNIFICANT NOTE
10/16/17 1021   Rehab Treatment   Discipline occupational therapist   Treatment Not Performed unable to treat, medical status change  (Rapid response called 2/2 c/o chest pains. )

## 2017-10-16 NOTE — PROGRESS NOTES
"   LOS: 5 days   Patient Care Team:  No Known Provider as PCP - General      Interval History: He did have Chest Pains This Morning Which Responded to Toradol IV.  It Was More so Reproducible by Pressure on the Chest Wall.  We'll Continue to Get Serial Cardiac Enzymes.  Patient Continues to Feel He Does Not Want Any Workup with Respect to CAD.      Review of Systems:   Pertinent items are noted in HPI.      Objective     Vital Sign Min/Max for last 24 hours  Temp  Min: 97 °F (36.1 °C)  Max: 98.3 °F (36.8 °C)   BP  Min: 93/63  Max: 115/78   Pulse  Min: 76  Max: 102   Resp  Min: 16  Max: 18   SpO2  Min: 91 %  Max: 98 %   Flow (L/min)  Min: 2  Max: 2   Weight  Min: 102 lb 1.6 oz (46.3 kg)  Max: 102 lb 1.6 oz (46.3 kg)     Flowsheet Rows         First Filed Value    Admission Height  74\" (188 cm) Documented at 10/10/2017 1717    Admission Weight  95 lb (43.1 kg) Documented at 10/10/2017 1717          Physical Exam:     General Appearance:    Verbally responsive malnourished    Head:    Normocephalic, without obvious abnormality, atraumatic   Eyes:            Lids and lashes normal, conjunctivae and sclerae normal, no   icterus, no pallor, corneas clear, PERRLA   Ears:    Ears appear intact with no abnormalities noted   Throat:   No oral lesions, no thrush, oral mucosa moist   Neck:   No adenopathy, supple, trachea midline, no thyromegaly, no   carotid bruit, no JVD   Back:     No kyphosis present, no scoliosis present, no skin lesions,      erythema or scars, no tenderness to percussion or                   palpation,   range of motion normal   Lungs:     Clear to auscultation,respirations regular, even and                  unlabored    Heart:    Regular rhythm and normal rate, normal S1 and S2, no            murmur, no gallop, no rub, no click   Chest Wall:    No abnormalities observed   Abdomen:     Normal bowel sounds, no masses, no organomegaly, soft        non-tender, non-distended, no guarding, no rebound          "       tenderness   Rectal:     Deferred   Extremities:   Moves all extremities well, no edema, no cyanosis, no             redness   Pulses:   Pulses palpable and equal bilaterally   Skin:   No bleeding, bruising or rash   Lymph nodes:   No palpable adenopathy   Neurologic:   Cranial nerves 2 - 12 grossly intact, sensation intact, DTR       present and equal bilaterally          Results Review:     I reviewed the patient's new clinical results.    Results Review:   I reviewed the patient's new clinical results.    EKG: Sinus rhythm with no acute ST segment changes..    LAB DATA :         Laboratory results:      Results from last 7 days  Lab Units 10/16/17  0600 10/15/17  0616 10/14/17  0427 10/13/17  0408 10/12/17  0452 10/11/17  0528 10/10/17  1726   SODIUM mmol/L 141 142 144 150* 148* 144 140   POTASSIUM mmol/L 3.1* 3.2* 3.2* 3.0* 3.1* 4.1 4.5   CHLORIDE mmol/L 107 106 111* 115* 113* 107 104   CO2 mmol/L 27.0 28.0 28.0 28.0 22.0* 25.0* 24.0*   BUN mg/dL 21* 21* 25* 31* 37* 50* 51*   CREATININE mg/dL 0.70 0.70 0.70 0.80 0.80 1.00 1.00   CALCIUM mg/dL 9.1 9.1 8.9 8.9 9.1 9.1 9.5   BILIRUBIN mg/dL  --   --   --  0.4  --   --  0.9   ALK PHOS U/L  --   --   --  129*  --   --  147*   ALT (SGPT) U/L  --   --   --  31  --   --  37   AST (SGOT) U/L  --   --   --  35  --   --  26   GLUCOSE mg/dL 81 75 80 83 128* 195* 119*       Results from last 7 days  Lab Units 10/16/17  0600 10/15/17  0616 10/14/17  0427 10/13/17  0408 10/12/17  0452 10/11/17  0528 10/10/17  1726   WBC 10*3/mm3 9.50 10.69 8.05 10.43 10.14 13.24* 11.98*   HEMOGLOBIN g/dL 10.9* 11.7* 9.9* 9.6* 10.4* 12.4* 12.3*   HEMATOCRIT % 33.7* 37.0* 31.8* 30.4* 33.1* 38.0* 38.4*   PLATELETS 10*3/mm3 71* 69* 65* 69* 80* 125* 102*       Results from last 7 days  Lab Units 10/10/17  1726   INR  1.58*       Results from last 7 days  Lab Units 10/10/17  1819 10/10/17  1814   BLOODCX  No growth at 5 days No growth at 5 days           Results from last 7 days  Lab Units  10/12/17  0452 10/11/17  0528   CK TOTAL U/L  --  46   TROPONIN I ng/mL 0.016 <0.012               Results from last 7 days  Lab Units 10/12/17  1304 10/11/17  1120   PH, ARTERIAL pH units 7.468 7.430   PCO2, ARTERIAL mm Hg 32.3* 32.8*   PO2 ART mm Hg 203.0* 63.6*   HCO3 ART mmol/L 23.3 21.7*   BASE EXCESS ART mmol/L -0.3 -2.6   O2 SATURATION ART %  --  91.0   HEMOGLOBIN, ARTERIAL g/dL 10.6* 12.2   OXYHEMOGLOBIN % 98.4 89.2*   METHEMOGLOBIN % 0.90 1.00   CARBOXYHEMOGLOBIN % 0.7 1.0   MODALITY  Cannula - High Flow Mask - Nonbreather   FIO2 % 65 100     No results found for: HGBA1C    Results from last 7 days  Lab Units 10/10/17  1726   TSH mIU/mL 4.240       Results from last 7 days  Lab Units 10/10/17  1726   LIPASE U/L 70       IMAGING DATA:     Xr Chest 2 View    Result Date: 10/11/2017  Narrative: PROCEDURE: XR CHEST 2 VW-    HISTORY: ams, hypothermia  COMPARISON: September 23, 2017.  FINDINGS: The heart is normal in size. The mediastinum is unremarkable. There are emphysematous changes to the lungs. Patchy basilar opacities are similar to the prior exam. There is no pneumothorax. There are no acute osseous abnormalities.         Impression: Emphysematous changes with patchy basilar opacities which may reflect atelectasis or pneumonia.    This report was finalized on 10/11/2017 7:34 AM by Yohan Lambert M.D..    Xr Chest 2 View    Result Date: 9/24/2017  Narrative: PROCEDURE: XR CHEST 2 VW-  HISTORY: Simple Sepsis triage protocol  COMPARISON: None.  FINDINGS: Electrodes overlie the chest.The heart is normal in size. The mediastinum is unremarkable. Vascular calcification of the aorta. Chronic interstitial changes are noted, there is a right basilar opacity which may represent acute pneumonia. Right-sided rib fractures are noted posteriorly, likely chronic. There are more acute appearing fractures of the lower lateral right ribs. Consider dedicated rib series. There is no pneumothorax.          Impression: 1.  Chronic interstitial changes with asymmetrical opacity at the right base, this may be secondary to pneumonia. Continued follow-up is recommended.  2. Right-sided rib fractures are noted posteriorly, likely chronic. There are more acute appearing fractures of the lower lateral right ribs. Consider dedicated rib series.  Continued followup is recommended.  This report was finalized on 9/24/2017 7:46 AM by Shay Sanchez DO.    Ct Head Without Contrast    Result Date: 10/10/2017  Narrative: FINAL REPORT TECHNIQUE: Axial images through the head was performed without contrast.This study was performed with techniques to keep radiation doses as low as reasonably achievable, (ALARA). Individualized dose reduction techniques using automated exposure control or adjustment of mA and/or kV according to the patient's size were employed. CLINICAL HISTORY: AMS FINDINGS: There is moderate generalized atrophy. No abnormal density is seen.  Ventricles are normal.  There is no hemorrhage.  No mass effect is seen. Bone windows show no evidence of fracture.     Impression: No acute findings Authenticated by YADI Pollard MD on 10/10/2017 06:52:04 PM    Ct Head Without Contrast    Result Date: 9/23/2017  Narrative: FINAL REPORT TECHNIQUE: Multiple contiguous transaxial slices through the head were obtained without the intravenous administration of contrast. CLINICAL HISTORY: SYNCOPE WITH HEAD INJ. FINDINGS: There is age-appropriate cortical atrophy with associated ventricular enlargement. Patchy periventricular white matter low attenuation is most consistent with chronic microvascular ischemia. There is no acute infarct, hemorrhage or mass effect. If concern persists, recommend MRI. There are moderate air-fluid levels in each maxillary and sphenoid sinus with scattered sinus mucosal thickening. There is no skull fracture. Impression: No acute intracranial abnormality Atrophy with chronic microvascular ischemia Acute on chronic sinusitis      Impression: Authenticated by Ric Oseguera MD on 09/23/2017 11:20:11 PM    Ct Chest Without Contrast    Result Date: 10/11/2017  Narrative: PROCEDURE: CT CHEST WO CONTRAST-  HISTORY: cough, shortness of breath, hypoxia, sepsis, altered mental status; G93.40-Encephalopathy, unspecified; R79.89-Other specified abnormal findings of blood chemistry; [id number]  COMPARISON:  None .  PROCEDURE:  Multiple axial CT images were obtained from the thoracic inlet through the upper abdomen without the use of contrast.  FINDINGS: Soft tissue windows reveal no axillary, hilar or mediastinal adenopathy. Heart size is normal. The aorta is normal in caliber. There are no pleural or pericardial effusions. Lung windows reveal patchy opacities in the bilateral upper and lower lobes consistent with a pneumonia. The visualized upper abdomen is unremarkable. Bone windows reveal no acute osseous abnormalities.      Impression: Diffuse patchy bilateral airspace disease consistent with a pneumonia.  787.88 mGy.cm     This study was performed with techniques to keep radiation doses as low as reasonably achievable (ALARA). Individualized dose reduction techniques using automated exposure control or adjustment of mA and/or kV according to the patient size were employed.  This report was finalized on 10/11/2017 7:35 AM by Yohan Lambert M.D..    Ct Cervical Spine Without Contrast    Result Date: 9/23/2017  Narrative: FINAL REPORT TECHNIQUE: Multiple contiguous transaxial slices through the cervical spine were obtained with coronal and sagittal reformatted images. CLINICAL HISTORY: SYNCOPE WITH HEAD INJ. FINDINGS: There is no acute fracture or subluxation. There is endplate osteophytosis and facet joint hypertrophy. Posterior disc osteophyte complexes in the lower spine are present. Prevertebral soft tissues are within normal limits.     Impression: Degenerative changes but no acute abnormality Authenticated by Ric Oseguera MD  on 09/23/2017 11:21:44 PM    Ct Lumbar Spine Without Contrast    Result Date: 10/11/2017  Narrative: PROCEDURE: CT LUMBAR SPINE WO CONTRAST-  HISTORY: abnormal gait, incontinence of bowel and bladder; G93.40-Encephalopathy, unspecified; R79.89-Other specified abnormal findings of blood chemistry; [id number]  TECHNIQUE: Multiple axial CT images were obtained through the lumbar spine using bone window algorithms. Coronal and sagittal images were reconstructed from the original axial data set.  FINDINGS: The lumbar spine is well aligned with no acute fractures. There are broad-based disc bulges from the L2 level distally producing mild-to-moderate central stenosis at multiple levels. The paraspinal soft tissues are unremarkable.          Impression: Degenerative change with no acute osseous abnormalities.              This study was performed with techniques to keep radiation doses as low as reasonably achievable (ALARA). Individualized dose reduction techniques using automated exposure control or adjustment of mA and/or kV according to the patient size were employed.   This report was finalized on 10/11/2017 7:36 AM by Yohan Lambert M.D..    Xr Chest 1 View    Result Date: 10/16/2017  Narrative: PROCEDURE: XR CHEST 1 VW-  HISTORY: pneumonia/costochondritis; G93.40-Encephalopathy, unspecified; R79.89-Other specified abnormal findings of blood chemistry; V10-Xsccsyxn; Z74.09-Other reduced mobility; Z74.09-Other reduced mobility  COMPARISON: October 12, 2017.  FINDINGS: The heart is normal in size. The mediastinum is unremarkable. There are emphysematous changes to the lungs. There are worsening bilateral opacities and effusions. There is no pneumothorax.  There are no acute osseous abnormalities.         Impression: Worsening bibasilar opacities and effusions.  Continued followup is recommended.  This report was finalized on 10/16/2017 2:09 PM by Yohan Lambert M.D..    Xr Chest 1 View    Result Date:  10/12/2017  Narrative: PROCEDURE: XR CHEST 1 VW-  HISTORY: pna; G93.40-Encephalopathy, unspecified; R79.89-Other specified abnormal findings of blood chemistry; D85-Qnnenynz  COMPARISON: 10/11/2017.  FINDINGS: The heart is normal in size. The mediastinum is unremarkable. There is worsening airspace disease in the right lung base consistent with a pneumonia. The patient's left basilar airspace disease is stable. There is no pneumothorax.  There are no acute osseous abnormalities.         Impression: Worsening right basilar airspace disease consistent with a pneumonia.  Continued followup is recommended.  This report was finalized on 10/12/2017 7:49 AM by Yohan Lambert M.D..    Xr Chest 1 View    Result Date: 10/11/2017  Narrative: PROCEDURE: XR CHEST 1 VW-  HISTORY: hypoxia; G93.40-Encephalopathy, unspecified; R79.89-Other specified abnormal findings of blood chemistry; P85-Rrmsztyp  COMPARISON: October 10, 2017.  FINDINGS: The heart is normal in size. The mediastinum is unremarkable. There is respiratory motion artifact. Patchy bilateral opacities predominantly involving the lung bases are unchanged. There is no pneumothorax.  There are no acute osseous abnormalities.         Impression: No significant change in the patients bilateral airspace disease.  Continued followup is recommended.  This report was finalized on 10/11/2017 11:58 AM by Yohan Lambert M.D..            Assessment/Plan    #1 chest pains: Patient did have chest pains this morning which appeared to be atypical in nature.  Nevertheless his abnormal Cardec enzymes with abnormal EKG and an abnormal LV systolic function.  Further workup with respect to CAD was discussed.  Patient does not want any pursued.  Given aspirin and Plavix along with a statin therapy.    #2 LV dysfunction: Patient's LV function is significantly reduced unable to tolerate medications secondary to his low blood pressures.  Has been put on midodrine at this time clinically  I do not feel this is helpful in his clinic status as this could cause further worsening if there is concomitant CAD.  Would discontinue the middle..  Would also start low-dose beta blocker therapy and assess his response.    #3 hypertension: Will need further evaluation for low possibilities.  Hypoadrenalism is a distinct possibility in him to his potassium appears to be low.  Will check his ACTH and cortisol level in the morning.  On him.  Therapy may start tapering dose of prednisone in a.m.    #4 limitation with inability: This is been the most major issue with respect to him.  This is being addressed as per the hospitalist team.    Principal Problem:    Right lower lobe pneumonia  Active Problems:    Acute metabolic encephalopathy    Cachexia    Hypokalemia    Costochondritis    Failure to thrive in adult            Aroldo Pratima Warner MD  10/16/17  5:58 PM

## 2017-10-16 NOTE — PROGRESS NOTES
Continued Stay Note  [facility]     Patient Name: Master Hall  MRN: [id number]  Today's Date: 10/16/2017    Admit Date: 10/10/2017          Discharge Plan       10/16/17 1006    Case Management/Social Work Plan    Plan Neighbor called and is assisting patient.. Will talk to her at 2 today.               Discharge Codes     None            Tiffany Elizabeth

## 2017-10-16 NOTE — PLAN OF CARE
Problem: Patient Care Overview (Adult)  Goal: Plan of Care Review  Outcome: Ongoing (interventions implemented as appropriate)    10/16/17 1619   Coping/Psychosocial Response Interventions   Plan Of Care Reviewed With patient   Patient Care Overview   Progress no change         Problem: NPPV/CPAP (Adult)  Goal: Signs and Symptoms of Listed Potential Problems Will be Absent or Manageable (NPPV/CPAP)  Outcome: Ongoing (interventions implemented as appropriate)    10/16/17 1619   NPPV/CPAP   Problems Assessed (NPPV/CPAP) skin breakdown;hypoxia/hypoxemia   Problems Present (NPPV/CPAP) none

## 2017-10-16 NOTE — THERAPY TREATMENT NOTE
Acute Care - Physical Therapy Treatment Note  [facility]     Patient Name: Master Hall  : 1940  MRN: [id number]  Today's Date: 10/16/2017  Onset of Illness/Injury or Date of Surgery Date: 10/10/17  Date of Referral to PT: 10/11/17  Referring Physician: Dr. Darnell    Admit Date: 10/10/2017    Visit Dx:    ICD-10-CM ICD-9-CM   1. Acute encephalopathy G93.40 348.30   2. Elevated lactic acid level R79.89 276.2   3. Cachexia R64 799.4   4. Impaired functional mobility, balance, gait, and endurance Z74.09 V49.89   5. Impaired mobility and ADLs Z74.09 799.89     Patient Active Problem List   Diagnosis   • Acute metabolic encephalopathy   • Cachexia   • Right lower lobe pneumonia   • Hypokalemia   • Costochondritis   • Failure to thrive in adult               Adult Rehabilitation Note       10/16/17 1002 10/14/17 1100       Rehab Assessment/Intervention    Discipline physical therapy assistant  -RM physical therapy assistant  -CK     Document Type therapy note (daily note)  -RM      Subjective Information complains of;pain   generalized per pt report.    -RM      Patient Effort, Rehab Treatment fair  -RM      Treatment Not Performed  other (see comments)  -CK     Treatment Not Performed, Comment  Pt declines tx citing chest pain and fatigue  -CK     Symptoms Noted During/After Treatment increased pain   reports chest pain   -RM      Precautions/Limitations fall precautions  -RM      Recorded by [RM] Ranjit Sun PTA [CK] Ankita Cramer PTA     Vital Signs    Post Systolic BP Rehab 90  -RM      Post Treatment Diastolic BP 63  -RM      Posttreatment Heart Rate (beats/min) 97  -RM      Post SpO2 (%) 90  -RM      O2 Delivery Post Treatment supplemental O2   2 lpm pnc   -RM      Pre Patient Position Supine  -RM      Intra Patient Position Sitting  -RM      Post Patient Position Supine  -RM      Recorded by [RM] Ranjit Sun PTA      Pain Assessment    Pain Assessment 0-10  -RM      Pain Score 0  -RM       Post Pain Score unable to assess  -RM      Pain Location Chest  -RM      Pain Orientation Mid;Lower  -RM      Pain Frequency Intermittent  -RM      Pain Onset Gradual  -RM      Recorded by [] Ranjit Sun PTA      Cognitive Assessment/Intervention    Current Cognitive/Communication Assessment functional  -RM      Orientation Status oriented to;person;place  -RM      Follows Commands/Answers Questions 100% of the time;able to follow single-step instructions;needs cueing;needs increased time  -RM      Personal Safety decreased awareness, need for safety  -RM      Personal Safety Interventions fall prevention program maintained  -RM      Recorded by [] Ranjit Sun PTA      Bed Mobility, Assessment/Treatment    Bed Mobility, Assistive Device bed rails;head of bed elevated  -RM      Bed Mobility, Roll Right, [place] minimum assist (75% patient effort)  -RM      Bed Mob, Supine to Sit, [place] minimum assist (75% patient effort)  -RM      Bed Mob, Sit to Supine, [place] minimum assist (75% patient effort)  -RM      Recorded by [] Ranjit Sun PTA      Positioning and Restraints    Pre-Treatment Position in bed  -RM      Post Treatment Position bed  -RM      In Bed supine;notified nsg;with nsg  -RM      Recorded by [] Ranjit Sun PTA        User Key  (r) = Recorded By, (t) = Taken By, (c) = Cosigned By    Initials Name Effective Dates    CK Ankita Cramer, PTA 10/26/16 -     RM Ranjit Sun, HARLEY 10/26/16 -                 IP PT Goals       10/16/17 1124 10/12/17 0920       Bed Mobility PT LTG    Bed Mobility PT LTG, Date Established  10/12/17  -LM     Bed Mobility PT LTG, Time to Achieve  2 wks  -LM     Bed Mobility PT LTG, Activity Type  supine to sit/sit to supine  -LM     Bed Mobility PT LTG, [place] Level  conditional independence  -LM     Bed Mobility PT Goal  LTG, Assist Device  bed rails  -LM     Bed Mobility PT LTG, Outcome goal ongoing  -RM goal ongoing   -LM     Transfer Training PT LTG    Transfer Training PT LTG, Date Established  10/12/17  -LM     Transfer Training PT LTG, Time to Achieve  2 wks  -LM     Transfer Training PT LTG, Activity Type  sit to stand/stand to sit;bed to chair /chair to bed  -LM     Transfer Training PT LTG, [place] Level  contact guard assist  -LM     Transfer Training PT LTG, Assist Device  walker, rolling  -LM     Transfer Training PT LTG, Outcome  goal ongoing  -LM     Gait Training PT LTG    Gait Training Goal PT LTG, Date Established  10/12/17  -LM     Gait Training Goal PT LTG, Time to Achieve  2 wks  -LM     Gait Training Goal PT LTG, [place] Level  contact guard assist  -LM     Gait Training Goal PT LTG, Assist Device  walker, rolling  -LM     Gait Training Goal PT LTG, Distance to Achieve  100  -LM     Gait Training Goal PT LTG, Outcome  goal ongoing  -LM     Strength Goal PT LTG    Strength Goal PT LTG, Date Established  10/12/17  -LM     Strength Goal PT LTG, Time to Achieve  2 wks  -LM     Strength Goal PT LTG, Measure to Achieve  Patient will perform B LE ther ex x 15 reps to improve functional strength for mobility.  -LM     Strength Goal PT LTG, Outcome  goal ongoing  -LM       User Key  (r) = Recorded By, (t) = Taken By, (c) = Cosigned By    Initials Name Provider Type    LM Citlalli Angela, PT Physical Therapist    RM Ranjit Sun, PTA Physical Therapy Assistant          Physical Therapy Education     Title: PT OT SLP Therapies (Active)     Topic: Physical Therapy (Done)     Point: Mobility training (Done)    Learning Progress Summary    Learner Readiness Method Response Comment Documented by Status   Patient Acceptance E VU Purpose of PT/POC. LM 10/12/17 0920 Done               Point: Precautions (Done)    Learning Progress Summary    Learner Readiness Method Response Comment Documented by Status   Patient Acceptance E VU Purpose of PT/POC. LM 10/12/17 0920 Done                      User Key     Initials  Effective Dates Name Provider Type Discipline     10/26/16 -  Citlalli Angela PT Physical Therapist PT                    PT Recommendation and Plan  Anticipated Discharge Disposition: inpatient rehabilitation facility  Planned Therapy Interventions: balance training, bed mobility training, gait training, home exercise program, patient/family education, strengthening, transfer training  PT Frequency: daily  Plan of Care Review  Plan Of Care Reviewed With: patient (Nurse )  Progress: improving  Outcome Summary/Follow up Plan: Pt was found supine in bed and willing to participate with therapy.  Pt c/o generalized pain that was intermittant in nature. Pt was transferred from supine to sitting EOB. Pt reported chest pain  and nursing was called. Pt was returned to supine and grasp his chest. that was when nurse called rapid response.            Outcome Measures       10/16/17 1002          How much help from another person do you currently need...    Turning from your back to your side while in flat bed without using bedrails? 3  -RM      Moving from lying on back to sitting on the side of a flat bed without bedrails? 3  -RM      Moving to and from a bed to a chair (including a wheelchair)? 2  -RM      Standing up from a chair using your arms (e.g., wheelchair, bedside chair)? 2  -RM      Climbing 3-5 steps with a railing? 1  -RM      To walk in hospital room? 2  -RM      AM-PAC 6 Clicks Score 13  -RM      Functional Assessment    Outcome Measure Options AM-PAC 6 Clicks Basic Mobility (PT)  -RM        User Key  (r) = Recorded By, (t) = Taken By, (c) = Cosigned By    Initials Name Provider Type     Ranjit Sun, PTA Physical Therapy Assistant           Time Calculation:         PT Charges       10/16/17 1132          Time Calculation    Start Time 1002  -RM      PT Received On 10/16/17  -RM      PT Goal Re-Cert Due Date 10/22/17  -RM      Time Calculation- PT    Total Timed Code Minutes- PT 27 minute(s)  -RM         User Key  (r) = Recorded By, (t) = Taken By, (c) = Cosigned By    Initials Name Provider Type     Ranjit Sun PTA Physical Therapy Assistant          Therapy Charges for Today     Code Description Service Date Service Provider Modifiers Qty    06276097014 HC PT THERAPEUTIC ACT EA 15 MIN 10/16/2017 Ranjit Sun PTA GP 2          PT G-Codes  Outcome Measure Options: AM-PAC 6 Clicks Basic Mobility (PT)    Ranjit Sun PTA  10/16/2017

## 2017-10-16 NOTE — PROGRESS NOTES
[facility]    Name:  Master Hall   Age:  77 y.o.  Sex:  male  :  1940  MRN:  [id number]   Visit Number:  [id number]  Admission Date:  10/10/2017  Date Of Service:  10/16/17  Primary Care Physician:  No Known Provider     LOS: 5 days :  Patient Care Team:  No Known Provider as PCP - General:    Chief Complaint:      CP/Failure to Thrive/Hypokalemia/CAP    Subjective / Interval History:     I have reviewed labs/imaging/records from this hospitalization, including ER staff and admitting/attending physicians H/P's and progress notes to establish a comprehensive understanding of this patient's clinical hospital course, as well as to establish a transition of care appropriately.    Pt is a 76 yo male, admitted secondary to RAUL/CP/ALMAZAN, with diagnosis of FTT/cacchexia and RLL pneumonia on initial eval, started on appropriate abx d/t underlying sepsis clinical picture; sepsis appears to have resolved, and cultures have returned negative; afebrile, but still has CP with palpation of chest/sternum, and has been present since a fall at a grocery store; denies hemoptysis; states duoneb tx's very helpful; has been eval by cardiology, but pt defers any further cardiac intervention or diagnostics at this time.    No N/V; good appetite, eating well; denies hematuria or dysuria; slept well; norbert shoulder arthralgias and sternal pain with movement and palpation; denies orthopnea.    Review of Systems:     General ROS: Patient denies any fevers, chills or loss of consciousness.  Respiratory ROS: Denies cough or shortness of breath.  Cardiovascular ROS: Denies chest pain or palpitations. No history of exertional chest pain.  Gastrointestinal ROS: Denies nausea and vomiting. Denies any abdominal pain. No diarrhea.  Neurological ROS: Denies any focal weakness. No loss of consciousness. Denies any numbness. Denies neck pain.  Dermatological ROS: Denies any redness or  pruritis.    Vital Signs:    Temp:  [97 °F (36.1 °C)-98.1 °F (36.7 °C)] 97.7 °F (36.5 °C)  Heart Rate:  [64-98] 89  Resp:  [16-20] 17  BP: ()/(63-89) 101/84    Intake and output:    I/O last 3 completed shifts:  In: 400 [IV Piggyback:400]  Out: 225 [Urine:225]  I/O this shift:  In: 480 [P.O.:480]  Out: -     Physical Examination:    General Appearance:  Alert and cooperative, not in any acute distress.  Mod ill-appearing, very thin build.   Head:  Atraumatic and normocephalic, without obvious abnormality.   Eyes:          PERRLA, conjunctivae and sclerae normal, no Icterus. No pallor. Extra-occular movements are within normal limits.   Neck: Supple, trachea midline, no thyromegaly, no carotid bruit.   Lungs:   Chest shape is normal. Breath sounds heard bilaterally equally.  Mod referred upp airway china, cleared with cough, trace end-exp wheezes norbert. No Pleural rub or bronchial breathing.   Heart:  Normal S1 and S2, no murmur, no gallop, no rub. No JVD. Focal tenderness to palpation of mid-lower sternum and costochondral junctions norbert.   Abdomen:   Normal bowel sounds, no masses, no organomegaly. Soft       non-tender, non-distended, no guarding, no rebound tenderness.   Extremities: Moves all extremities well, no edema, no cyanosis, no            Clubbing.  Thin, frail build.   Skin: No bleeding, bruising or rash.   Neurologic: Awake, alert and oriented times 3. Moves all 4 extremities equally.   Laboratory results:      Results from last 7 days  Lab Units 10/16/17  0600 10/15/17  0616 10/14/17  0427 10/13/17  0408  10/10/17  1726   SODIUM mmol/L 141 142 144 150*  < > 140   POTASSIUM mmol/L 3.1* 3.2* 3.2* 3.0*  < > 4.5   CHLORIDE mmol/L 107 106 111* 115*  < > 104   CO2 mmol/L 27.0 28.0 28.0 28.0  < > 24.0*   BUN mg/dL 21* 21* 25* 31*  < > 51*   CREATININE mg/dL 0.70 0.70 0.70 0.80  < > 1.00   CALCIUM mg/dL 9.1 9.1 8.9 8.9  < > 9.5   BILIRUBIN mg/dL  --   --   --  0.4  --  0.9   ALK PHOS U/L  --   --   --  129*   --  147*   ALT (SGPT) U/L  --   --   --  31  --  37   AST (SGOT) U/L  --   --   --  35  --  26   GLUCOSE mg/dL 81 75 80 83  < > 119*   < > = values in this interval not displayed.    Results from last 7 days  Lab Units 10/16/17  0600 10/15/17  0616 10/14/17  0427   WBC 10*3/mm3 9.50 10.69 8.05   HEMOGLOBIN g/dL 10.9* 11.7* 9.9*   HEMATOCRIT % 33.7* 37.0* 31.8*   PLATELETS 10*3/mm3 71* 69* 65*       Results from last 7 days  Lab Units 10/10/17  1726   INR  1.58*       Results from last 7 days  Lab Units 10/12/17  0452 10/11/17  0528 10/10/17  1726   CK TOTAL U/L  --  46 35   TROPONIN I ng/mL 0.016 <0.012 0.018       Results from last 7 days  Lab Units 10/10/17  1819 10/10/17  1814   BLOODCX  No growth at 5 days No growth at 5 days           I have reviewed the patient's laboratory results.    Radiology results:    Imaging Results (last 24 hours)     ** No results found for the last 24 hours. **          I have reviewed the patient's radiology reports.    Medication Review:     I have reviewed the patients active and prn medications.         Assessment:  Principal Problem:    Right lower lobe pneumonia  Active Problems:    Acute metabolic encephalopathy    Hypokalemia    Cachexia    Costochondritis    Failure to thrive in adult        Plan:  Will consult PT/OT; discussed with pt at length the likelihood of underlying CAD, and he defers any intervention or further diagnostics/eval at this time; he verb understanding of risks of such, and accepts those risks; he has sig costochondritis, which will be treated with IV toradol x 1 now; will follow clinically; his cultures have been negative; will de-escalate his abx, stop Zosyn and start rocephin IV; will cont with other resp meds/regimen at this time; PT/OT consulted for eval also; will continue to follow along with CM/SW for probable placement for SNF services.  Cont daily KCl supplementation, follow electrolytes daily; VSS, and on PPI; cont IS use; SCUDs to norbert LE.  No  acute changes on EKG today; will repeat CXR.    I have spent a total of 35 mins in direct pt care today.    Saji Huertas DO  10/16/17  11:07 AM

## 2017-10-16 NOTE — PLAN OF CARE
Problem: Patient Care Overview (Adult)  Goal: Plan of Care Review  Outcome: Ongoing (interventions implemented as appropriate)    10/16/17 1948   Coping/Psychosocial Response Interventions   Plan Of Care Reviewed With patient   Patient Care Overview   Progress no change   Outcome Evaluation   Outcome Summary/Follow up Plan Patient complains of intermittent chest pain to a sqaure to his stomach. Dr. Huertas and Chica aware. Patient with normal sinus rhythm with occ. PVC and PAC. Working with PT and OT today and patient grabbed at chest stating his chest hurt and dr. huertas and chica at bedside.

## 2017-10-17 PROBLEM — E27.40 ACUTE ADRENAL INSUFFICIENCY (HCC): Status: ACTIVE | Noted: 2017-01-01

## 2017-10-17 PROBLEM — F32.9 MAJOR DEPRESSION, CHRONIC: Chronic | Status: ACTIVE | Noted: 2017-01-01

## 2017-10-17 PROBLEM — J69.0 ASPIRATION PNEUMONIA OF BOTH LOWER LOBES (HCC): Status: ACTIVE | Noted: 2017-01-01

## 2017-10-17 NOTE — PROGRESS NOTES
Continued Stay Note  [facility]     Patient Name: Master Hall  MRN: [id number]  Today's Date: 10/17/2017    Admit Date: 10/10/2017          Discharge Plan       10/17/17 0819    Case Management/Social Work Plan    Plan Patient accepted by [facility] and can discharge there today. Notified Dr. Huertas. Report number is [id number].               Discharge Codes     None            Tiffany Elizabeth

## 2017-10-17 NOTE — PLAN OF CARE
Problem: Patient Care Overview (Adult)  Goal: Plan of Care Review  Outcome: Ongoing (interventions implemented as appropriate)    10/17/17 0342   Coping/Psychosocial Response Interventions   Plan Of Care Reviewed With patient   Patient Care Overview   Progress improving   Outcome Evaluation   Outcome Summary/Follow up Plan Confusion improving. No complaints of chest pain overnight.       Goal: Adult Individualization and Mutuality    10/13/17 2000 10/15/17 0348   Individualization   Patient Specific Preferences likes watch tv --    Patient Specific Interventions --  Bed alarm on at all times.       Goal: Discharge Needs Assessment  Outcome: Ongoing (interventions implemented as appropriate)    Problem: Nutrition, Imbalanced: Inadequate Oral Intake (Adult)  Goal: Improved Oral Intake  Outcome: Ongoing (interventions implemented as appropriate)    10/17/17 0342   Nutrition, Imbalanced: Inadequate Oral Intake (Adult)   Improved Oral Intake making progress toward outcome       Goal: Prevent Further Weight Loss  Outcome: Ongoing (interventions implemented as appropriate)    10/17/17 0342   Nutrition, Imbalanced: Inadequate Oral Intake (Adult)   Prevent Further Weight Loss making progress toward outcome         Problem: Fall Risk (Adult)  Goal: Absence of Falls  Outcome: Ongoing (interventions implemented as appropriate)    10/17/17 0342   Fall Risk (Adult)   Absence of Falls making progress toward outcome         Problem: Pressure Ulcer (Adult)  Goal: Signs and Symptoms of Listed Potential Problems Will be Absent or Manageable (Pressure Ulcer)  Outcome: Ongoing (interventions implemented as appropriate)    10/17/17 0342   Pressure Ulcer   Problems Assessed (Pressure Ulcer) all   Problems Present (Pressure Ulcer) none         Problem: Confusion, Acute (Adult)  Goal: Cognitive/Functional Impairments Minimized  Outcome: Ongoing (interventions implemented as appropriate)    10/17/17 0342   Confusion, Acute (Adult)    Cognitive/Functional Impairments Minimized making progress toward outcome       Goal: Safety  Outcome: Ongoing (interventions implemented as appropriate)    10/17/17 0342   Confusion, Acute (Adult)   Safety making progress toward outcome         Problem: Infection, Risk/Actual (Adult)  Goal: Infection Prevention/Resolution  Outcome: Ongoing (interventions implemented as appropriate)    10/17/17 0342   Infection, Risk/Actual (Adult)   Infection Prevention/Resolution making progress toward outcome

## 2017-10-17 NOTE — DISCHARGE SUMMARY
[facility]   DISCHARGE SUMMARY      Name:  Master Hall   Age:  77 y.o.  Sex:  male  :  1940  MRN:  [id number]   Visit Number:  [id number]  Primary Care Physician:  No Known Provider  Date of Discharge:  10/17/2017  Admission Date:  10/10/2017      Discharge Diagnosis:         Principal Problem:    Aspiration pneumonia of both lower lobes  Active Problems:    Acute metabolic encephalopathy    Acute adrenal insufficiency    Hypokalemia    Cachexia    Costochondritis    Failure to thrive in adult    Major depression, chronic      Presenting Problem/History of Present Illness:    Acute encephalopathy [G93.40]     Consults:     Consults     Date and Time Order Name Status Description    10/12/2017 1239 Inpatient Consult to Cardiology      10/11/2017 1147 Inpatient Consult to Pulmonology Completed           Procedures Performed:  Echocardiogram Findings   Left Ventricle  Estimated EF appears to be in the range of 26 - 30%. There is left ventricular global hypokinesis noted.   Normal left ventricular cavity size and wall thickness noted. Global left ventricular wall motion appears abnormal. Normal left atrial pressure. E/eprime is about 8 with LVedp of 12 mm of hg .   Right Ventricle  Normal right ventricular cavity size, wall thickness, systolic function and septal motion noted.   Left Atrium  Normal left atrial size noted.   Right Atrium  Normal right atrial size noted.   Aortic Valve  The aortic valve is abnormal in structure. The valve exhibits sclerosis.   Mitral Valve  The mitral valve is abnormal in structure. Mild-to-moderate mitral valve regurgitation is present.   Tricuspid Valve  The tricuspid valve is abnormal. Mild to moderate tricuspid valve regurgitation is present. Estimated right ventricular systolic pressure from tricuspid regurgitation is moderately elevated (45-55 mmHg).   Pulmonic Valve  The pulmonic valve was not well visualized.   Greater Vessels  No dilation  of the aortic root is present. No dilation of the sinuses of Valsalva is present.   Pericardium  The pericardium is normal. There is no evidence of pericardial effusion.                History of presenting illness:    Patient is a cachectic chronically ill appearing  gentleman, appearing unkept with poor hygeine, who presented to ER via EMS with altered mental status. The patient reportedly lives alone, without family or friend to assist in his care but he does care for a dog and cat at home. EMS reported patient found lying in bed, covered in his own urine and feces. IT is unknown how long he had been lying there. Patient presented with core temp 95.8. He had intermittent confusion and difficulty answering questions. He is alert to self only.     In ER, labs obtained with elevated WBC, hypothermia less than 96. He was warmed with blankets. He was placed on 2 liter nasal cannula with oxygen saturation 92%. Urinalysis negative for infection. He remained incontinent of urine and states he also stools on himself. He was unsure if it was confusion or other, but reports over a month duration of this. Chest xray showed chronic intersitial changes preliminary read. CT head with moderate atrophy without acute abnormality. He was provided 1 liter normal saline. His mentation did not improves, as he remained further confused. Considered sepsis. Hospitalist admitted for further evaluation and workup and treatment.      Later after warmed to 97 axillary temp, patient reports having intermittent cough, pain in chest unable to describe with shortness of breath, worse over the past 1-1.5 months. He admits to feeling so weak, he has been unable to walk safely and had passed out. Last month, patient had been seen in ER with syncope, head laceration with staples, found to have pneumonia on 5 liter nasal cannula and signed out AMA but was prescribed albuterol and levaquin. Further details could not be obtained from  patient.    Hospital Course:    I have reviewed labs/imaging/records from this hospitalization, including ER staff and admitting/attending physicians H/P's and progress notes to establish a comprehensive understanding of this patient's clinical hospital course, as well as to establish a transition of care appropriately.    Pt is a 78 yo male, admitted secondary to RAUL/CP/ALMAZAN, with diagnosis of FTT/cacchexia and RLL pneumonia on initial eval, started on appropriate abx d/t underlying sepsis clinical picture; sepsis appears to have resolved, and cultures have returned negative; afebrile, but still has CP with palpation of chest/sternum, and has been present since a fall at a grocery store; denies hemoptysis; states duoneb tx's very helpful; has been eval by cardiology, but pt defers any further cardiac intervention or diagnostics at this time, even though he has sig reduced LVEF and high probability of underlying CAD of significant nature.  Discussed with pt at length the likelihood of underlying CAD, and he defers any intervention or further diagnostics/eval at this time; he verb understanding of risks of such, and accepts those risks.  Will cont low dose BB, as well as statin therapy and plavix/aspirin.  On BID dosing of pepcid.     No N/V; tolerating po intake, eating well; denies hematuria or dysuria; sleeping well; norbert shoulder arthralgias and sternal pain with movement and palpation; denies orthopnea.    Concern over adrenal insufficiency in face of hypokalemia and continued hypotension; no sig response to midodrine, and agreed with discontinuation recommendation by cardiology.  Diagnosis and differentiation of type of adrenal insufficiency will help guide prognosis of recovery vs choice of therapy; I will add fludrocortisone at 0.1mg daily dosing for now; can monitor for effectiveness after discharge; primary (disease) vs secondary (syndrome, chronic/subacute) vs. Relative/acute-illness insufficiency (bibasilar  pneumonia, likely of aspiration etiology) can be ascertained from labs drawn earlier today, including ACTH and cortisol levels; fludrocortisone is a good choice for either secondary, and for a period of time d/t relative insufficiency.    Will take another couple of days, up to 4, to get results back for cortisol and ACTH, but irregardless of type of adrenal insufficiency, treatment should not be delayed; we can extract from results further clarification of type.  Will have attending provider at SNF address in f/u.    Pt has major depressive d/o, currently severely deconditioned, and requires SNF placement for rehabilitation; given his FTT, may be a LTC resident in future.  Will have pt eval by Speech Therapy concerning aspiration, as well as OT/PT.  To finish course of Augmentin for 10 days.  Will need weekly BMP x 2 weeks, as we are starting steroid supplementation, fludrocortisone, and continuing daily KCl supplement.    Vital Signs:    Temp:  [97.3 °F (36.3 °C)-98.3 °F (36.8 °C)] 97.6 °F (36.4 °C)  Heart Rate:  [] 100  Resp:  [14-18] 16  BP: ()/(54-85) 96/71    Physical Exam:    General Appearance:  Alert and cooperative, not in any acute distress.  Chronically ill-appearing male.   Head:  Atraumatic and normocephalic, without obvious abnormality.   Eyes:          PERRLA, conjunctivae and sclerae normal, no Icterus. No pallor. Extra-occular movements are within normal limits.   Ears:  Ears appear intact with no abnormalities noted.   Throat: No oral lesions, no thrush, oral mucosa pink and moist.   Neck: Supple, trachea midline, no thyromegaly, no carotid bruit.   Back:   No kyphoscoliosis present. No tenderness to palpation,   range of motion normal.   Lungs:   Chest shape is normal. Breath sounds heard bilaterally with AAE to all lung fields.  Mild, rhonchus referred congestion norbert lungs, upper airways, cleared slightly with cough. No Pleural rub or bronchial breathing.   Heart:  Normal S1 and S2,  no murmur, no gallop, no rub. No JVD.  Thin frail chest.   Abdomen:   Normal bowel sounds, no masses, no organomegaly. Soft     non-tender, non-distended, no guarding, no rebound tenderness.   Extremities: Moves all extremities, no edema, no cyanosis, no clubbing.  Thin, frail build.     Pulses: Pulses palpable and equal bilaterally.   Skin: No bleeding, bruising or rash.  Dark complexion.   Lymph nodes: No palpable adenopathy.   Neurologic: Alert and oriented x 3. Moves all four limbs equally. No tremors. No facial asymetry.       Pertinent Lab Results:       Results from last 7 days  Lab Units 10/17/17  0656 10/16/17  0600 10/15/17  0616  10/13/17  0408  10/10/17  1726   SODIUM mmol/L 141 141 142  < > 150*  < > 140   POTASSIUM mmol/L 3.6 3.1* 3.2*  < > 3.0*  < > 4.5   CHLORIDE mmol/L 106 107 106  < > 115*  < > 104   CO2 mmol/L 29.0 27.0 28.0  < > 28.0  < > 24.0*   BUN mg/dL 23* 21* 21*  < > 31*  < > 51*   CREATININE mg/dL 0.70 0.70 0.70  < > 0.80  < > 1.00   CALCIUM mg/dL 9.3 9.1 9.1  < > 8.9  < > 9.5   BILIRUBIN mg/dL  --   --   --   --  0.4  --  0.9   ALK PHOS U/L  --   --   --   --  129*  --  147*   ALT (SGPT) U/L  --   --   --   --  31  --  37   AST (SGOT) U/L  --   --   --   --  35  --  26   GLUCOSE mg/dL 89 81 75  < > 83  < > 119*   < > = values in this interval not displayed.    Results from last 7 days  Lab Units 10/16/17  0600 10/15/17  0616 10/14/17  0427   WBC 10*3/mm3 9.50 10.69 8.05   HEMOGLOBIN g/dL 10.9* 11.7* 9.9*   HEMATOCRIT % 33.7* 37.0* 31.8*   PLATELETS 10*3/mm3 71* 69* 65*       Results from last 7 days  Lab Units 10/10/17  1726   INR  1.58*       Results from last 7 days  Lab Units 10/17/17  0656 10/12/17  0452 10/11/17  0528 10/10/17  1726   CK TOTAL U/L  --   --  46 35   TROPONIN I ng/mL 0.014 0.016 <0.012 0.018       Results from last 7 days  Lab Units 10/10/17  1726   PROBNP pg/mL 1120.0*           Results from last 7 days  Lab Units 10/10/17  1726   LIPASE U/L 70       Results from last  7 days  Lab Units 10/12/17  1304   PH, ARTERIAL pH units 7.468   PO2 ART mm Hg 203.0*   PCO2, ARTERIAL mm Hg 32.3*   HCO3 ART mmol/L 23.3       Results from last 7 days  Lab Units 10/10/17  2314   COLOR UA  Yellow   GLUCOSE UA  Negative   KETONES UA  Negative   LEUKOCYTES UA  Negative   PH, URINE  <=5.0   BILIRUBIN UA  Negative   UROBILINOGEN UA  0.2 E.U./dL     Pain Management Panel     There is no flowsheet data to display.          Results from last 7 days  Lab Units 10/10/17  1819 10/10/17  1814   BLOODCX  No growth at 5 days No growth at 5 days       Pertinent Radiology Results:    Imaging Results (all)     Procedure Component Value Units Date/Time    CT Head Without Contrast [[id number]] Collected:  10/10/17 1852     Updated:  10/10/17 1854    Narrative:       FINAL REPORT    TECHNIQUE:  Axial images through the head was performed without contrast.This study was performed with techniques to keep radiation doses as low as reasonably achievable, (ALARA). Individualized dose reduction techniques using automated exposure control or   adjustment of mA and/or kV according to the patient's size were employed.    CLINICAL HISTORY:  AMS    FINDINGS:  There is moderate generalized atrophy. No abnormal density is seen.  Ventricles are normal.  There is no hemorrhage.  No mass effect is seen.    Bone windows show no evidence of fracture.      Impression:       No acute findings    Authenticated by YADI Pollard MD on 10/10/2017 06:52:04 PM    XR Chest 2 View [[id number]] Collected:  10/11/17 0733     Updated:  10/11/17 0736    Narrative:       PROCEDURE: XR CHEST 2 VW-        HISTORY: ams, hypothermia     COMPARISON: September 23, 2017.     FINDINGS: The heart is normal in size. The mediastinum is unremarkable.  There are emphysematous changes to the lungs. Patchy basilar opacities  are similar to the prior exam. There is no pneumothorax. There are no  acute osseous abnormalities.           Impression:        Emphysematous changes with patchy basilar opacities which  may reflect atelectasis or pneumonia.           This report was finalized on 10/11/2017 7:34 AM by Yohan Lambert M.D..    CT Chest Without Contrast [[id number]] Collected:  10/11/17 0734     Updated:  10/11/17 0737    Narrative:       PROCEDURE: CT CHEST WO CONTRAST-     HISTORY: cough, shortness of breath, hypoxia, sepsis, altered mental  status; G93.40-Encephalopathy, unspecified; R79.89-Other specified  abnormal findings of blood chemistry; [id number]     COMPARISON:  None .     PROCEDURE:  Multiple axial CT images were obtained from the thoracic  inlet through the upper abdomen without the use of contrast.      FINDINGS:   Soft tissue windows reveal no axillary, hilar or mediastinal adenopathy.  Heart size is normal. The aorta is normal in caliber. There are no  pleural or pericardial effusions. Lung windows reveal patchy opacities  in the bilateral upper and lower lobes consistent with a pneumonia. The  visualized upper abdomen is unremarkable. Bone windows reveal no acute  osseous abnormalities.       Impression:       Diffuse patchy bilateral airspace disease consistent with a  pneumonia.     787.88 mGy.cm          This study was performed with techniques to keep radiation doses as low  as reasonably achievable (ALARA). Individualized dose reduction  techniques using automated exposure control or adjustment of mA and/or  kV according to the patient size were employed.      This report was finalized on 10/11/2017 7:35 AM by Yohan Lambert M.D..    CT Lumbar Spine Without Contrast [[id number]] Collected:  10/11/17 0735     Updated:  10/11/17 0738    Narrative:       PROCEDURE: CT LUMBAR SPINE WO CONTRAST-     HISTORY: abnormal gait, incontinence of bowel and bladder;  G93.40-Encephalopathy, unspecified; R79.89-Other specified abnormal  findings of blood chemistry; R47-Fyipekeb     TECHNIQUE: Multiple axial CT images were obtained through the  lumbar  spine using bone window algorithms. Coronal and sagittal images were  reconstructed from the original axial data set.      FINDINGS: The lumbar spine is well aligned with no acute fractures.  There are broad-based disc bulges from the L2 level distally producing  mild-to-moderate central stenosis at multiple levels. The paraspinal  soft tissues are unremarkable.            Impression:       Degenerative change with no acute osseous abnormalities.                              This study was performed with techniques to keep radiation doses as low  as reasonably achievable (ALARA). Individualized dose reduction  techniques using automated exposure control or adjustment of mA and/or  kV according to the patient size were employed.         This report was finalized on 10/11/2017 7:36 AM by Yohan Lambert M.D..    XR Chest 1 View [[id number]] Collected:  10/11/17 1157     Updated:  10/11/17 1205    Narrative:       PROCEDURE: XR CHEST 1 VW-     HISTORY: hypoxia; G93.40-Encephalopathy, unspecified; R79.89-Other  specified abnormal findings of blood chemistry; S29-[id number]     COMPARISON: October 10, 2017.     FINDINGS: The heart is normal in size. The mediastinum is unremarkable.  There is respiratory motion artifact. Patchy bilateral opacities  predominantly involving the lung bases are unchanged. There is no  pneumothorax.  There are no acute osseous abnormalities.           Impression:       No significant change in the patients bilateral airspace  disease.     Continued followup is recommended.     This report was finalized on 10/11/2017 11:58 AM by Yohan Lambert M.D..    XR Chest 1 View [[id number]] Collected:  10/12/17 0748     Updated:  10/12/17 0751    Narrative:       PROCEDURE: XR CHEST 1 VW-     HISTORY: pna; G93.40-Encephalopathy, unspecified; R79.89-Other specified  abnormal findings of blood chemistry; C85-Nujjkzkn     COMPARISON: 10/11/2017.     FINDINGS: The heart is normal in size. The  mediastinum is unremarkable.  There is worsening airspace disease in the right lung base consistent  with a pneumonia. The patient's left basilar airspace disease is stable.  There is no pneumothorax.  There are no acute osseous abnormalities.           Impression:       Worsening right basilar airspace disease consistent with a  pneumonia.     Continued followup is recommended.     This report was finalized on 10/12/2017 7:49 AM by Yohan Lambert M.D..    XR Chest 1 View [[id number]] Collected:  10/16/17 1342     Updated:  10/16/17 1412    Narrative:       PROCEDURE: XR CHEST 1 VW-     HISTORY: pneumonia/costochondritis; G93.40-Encephalopathy, unspecified;  R79.89-Other specified abnormal findings of blood chemistry;  E24-Ktexmojs; Z74.09-Other reduced mobility; Z74.09-Other reduced  mobility     COMPARISON: October 12, 2017.     FINDINGS: The heart is normal in size. The mediastinum is unremarkable.  There are emphysematous changes to the lungs. There are worsening  bilateral opacities and effusions. There is no pneumothorax.  There are  no acute osseous abnormalities.           Impression:       Worsening bibasilar opacities and effusions.     Continued followup is recommended.     This report was finalized on 10/16/2017 2:09 PM by Yohan Lambert M.D..          Condition on Discharge:      Improved/Stable    Code status during the hospital stay:    Full Code    Discharge Disposition:        Discharge Medication:     Master Hall   Home Medication Instructions FINA:[id number]    Printed on:10/17/17 1126   Medication Information                      acetaminophen (TYLENOL) 325 MG tablet  Take 2 tablets by mouth Every 4 (Four) Hours As Needed for Mild Pain .             amoxicillin-clavulanate (AUGMENTIN) 875-125 MG per tablet  Take 1 tablet by mouth Every 12 (Twelve) Hours for 10 days. Indications: Pneumonia             aspirin 81 MG EC tablet  Take 1 tablet by mouth Daily.             atorvastatin  (LIPITOR) 40 MG tablet  Take 1 tablet by mouth Every Night.             bisoprolol (ZEBeta) 5 MG tablet  Take 0.5 tablets by mouth Daily.             clopidogrel (PLAVIX) 75 MG tablet  Take 1 tablet by mouth Daily.             famotidine (PEPCID) 20 MG tablet  Take 1 tablet by mouth 2 (Two) Times a Day.             fludrocortisone 0.1 MG tablet  Take 1 tablet by mouth Daily.             ipratropium-albuterol (DUONEB) 0.5-2.5 mg/mL nebulizer  Take 3 mL by nebulization Every 4 (Four) Hours As Needed for Wheezing or Shortness of Air.             Multiple Vitamins-Minerals (MULTIVITAMIN WITH MINERALS) tablet tablet  Take 1 tablet by mouth Daily.             ondansetron (ZOFRAN) 4 MG tablet  Take 1 tablet by mouth Every 6 (Six) Hours As Needed for Nausea or Vomiting.             potassium chloride (KLOR-CON) 20 MEQ packet  Take 40 mEq by mouth Daily.                 Discharge Diet:     Diet Instructions     Diet: Dysphagia; Thin Liquids, No Restrictions; Mechanical Soft       Discharge Diet:  Dysphagia   Fluid Consistency:  Thin Liquids, No Restrictions   Pureed Options:  Mechanical Soft                 Activity at Discharge:     Activity Instructions     Activity as Tolerated                     Follow-up Appointments:    Additional Instructions for the Follow-ups that You Need to Schedule     Discharge Follow-up with PCP    As directed    Follow Up Details:  Primary attending physician at facility within 1 week for post-hospitalization follow up of labs and repeat serum potassium evaluation             Follow-up Information     Follow up with No Known Provider .    Why:  Primary attending physician at facility within 1 week for post-hospitalization follow up of labs and repeat serum potassium evaluation    Contact information:    [facility] [address]            Additional Instructions for the Follow-ups that You Need to Schedule     Discharge Follow-up with PCP    As directed    Follow Up Details:   Primary attending physician at facility within 1 week for post-hospitalization follow up of labs and repeat serum potassium evaluation                 Test Results Pending at Discharge:     Order Current Status    ACTH In process    Cortisol In process        Weekly BMP x 2 weeks; follow results of ACTH/cortisol levels when available in a few days; adjust dose of fludrocortisone as needed to maintain blood pressure; adjust supplement dosing of KCl as needed depending on results of BMP; complete course of augmentin x 10 days, with follow up CXR to clarify resolution of bibasilar pneumonia; will need speech therapy for evaluation of presumed microaspiration from severe deconditioned state of pt; PT/OT eval as well.     Saji Huertas DO  10/17/17  11:26 AM    Time: Discharge 40 min

## 2017-10-17 NOTE — THERAPY DISCHARGE NOTE
Acute Care - Occupational Therapy Discharge Summary  [facility]     Patient Name: Master Hall  : 1940  MRN: [id number]    Today's Date: 10/17/2017  Onset of Illness/Injury or Date of Surgery Date: 10/10/17    Date of Referral to OT: 10/12/17  Referring Physician: Dr. Darnell      Admit Date: 10/10/2017        OT Recommendation and Plan    Visit Dx:    ICD-10-CM ICD-9-CM   1. Acute encephalopathy G93.40 348.30   2. Elevated lactic acid level R79.89 276.2   3. Cachexia R64 799.4   4. Impaired functional mobility, balance, gait, and endurance Z74.09 V49.89   5. Impaired mobility and ADLs Z74.09 799.89                     OT Goals       10/12/17 1254          Transfer Training 2 OT LTG    Transfer Training 2 OT LTG, Date Established 10/12/17  -SD      Transfer Training 2 OT LTG, Time to Achieve 2 wks  -SD      Transfer Training 2 OT LTG, Activity Type sit to stand/stand to sit;toilet  -SD      Transfer Training 2 OT LTG, [place] minimum assist (75% patient effort)  -SD      Transfer Training 2 OT LTG, Assist Device walker, rolling  -SD      Transfer Training 2 OT LTG, Outcome goal ongoing  -SD      Strength OT LTG    Strength Goal OT LTG, Date Established 10/12/17  -SD      Strength Goal OT LTG, Time to Achieve 2 wks  -SD      Strength Goal OT LTG, Measure to Achieve Patient will perform 15 reps UB ther ex in order to increase strength and endurance.   -SD      Strength Goal OT LTG, Outcome goal ongoing  -SD      Static Standing Balance OT LTG    Static Standing Balance OT LTG, Date Established 10/12/17  -SD      Static Standing Balance OT LTG, Time to Achieve 2 wks  -SD      Static Standing Balance OT LTG, [place] minimum assist (75% patient effort)  -SD      Static Standing Balance OT LTG, Assist Device assistive device  -SD      Static Standing Balance OT LTG, Outcome goal ongoing  -SD      Grooming OT LTG    Grooming Goal OT LTG, Date Established 10/12/17  -SD      Grooming Goal  OT LTG, Time to Achieve 2 wks  -SD      Grooming Goal OT LTG, [place] Level minimum assist (75% patient effort)  -SD      Grooming Goal OT LTG, Position sitting, edge of bed  -SD      Grooming Goal OT LTG, Outcome goal ongoing  -SD      Toileting OT LTG    Toileting Goal OT LTG, Date Established 10/12/17  -SD      Toileting Goal OT LTG, Time to Achieve 2 wks  -SD      Toileting Goal OT LTG, [place] minimum assist (75% patient effort);2 person assist required  -SD      Toileting Goal OT LTG, Additional Goal BSC  -SD      Toileting Goal OT LTG, Outcome goal ongoing  -SD      Functional Mobility OT LTG    Functional Mobility Goal OT LTG, Date Established 10/12/17  -SD      Functional Mobility Goal OT LTG, Time to Achieve 2 wks  -SD      Functional Mobility Goal OT LTG, [place] Level min assist;x2  -SD      Functional Mobility Goal OT LTG, Assist Device rolling walker  -SD      Functional Mobility Goal OT LTG, Distance to Achieve to the door  -SD      Functional Mobility Goal OT LTG, Outcome goal ongoing  -SD        User Key  (r) = Recorded By, (t) = Taken By, (c) = Cosigned By    Initials Name Provider Type    SD Bella Pittman, OT Occupational Therapist                Outcome Measures       10/16/17 1002          How much help from another person do you currently need...    Turning from your back to your side while in flat bed without using bedrails? 3  -RM      Moving from lying on back to sitting on the side of a flat bed without bedrails? 3  -RM      Moving to and from a bed to a chair (including a wheelchair)? 2  -RM      Standing up from a chair using your arms (e.g., wheelchair, bedside chair)? 2  -RM      Climbing 3-5 steps with a railing? 1  -RM      To walk in hospital room? 2  -RM      AM-PAC 6 Clicks Score 13  -RM      Functional Assessment    Outcome Measure Options AM-PAC 6 Clicks Basic Mobility (PT)  -RM        User Key  (r) = Recorded By, (t) = Taken By, (c) = Cosigned By     Initials Name Provider Type     Ranjit Sun, PTA Physical Therapy Assistant              OT Discharge Summary  Anticipated Discharge Disposition: inpatient rehabilitation facility  Reason for Discharge: Discharge from facility  Outcomes Achieved: Unable to make functional progress toward goals at this time  Discharge Destination: Inpatient rehabilitation facility      Susanne Gutierrez  10/17/2017

## 2017-10-20 PROBLEM — G93.1 ANOXIC BRAIN INJURY (HCC): Status: ACTIVE | Noted: 2017-01-01

## 2017-10-20 PROBLEM — J96.00 ACUTE RESPIRATORY FAILURE (HCC): Status: ACTIVE | Noted: 2017-01-01

## 2017-10-20 PROBLEM — I46.9 CARDIAC ARREST (HCC): Status: ACTIVE | Noted: 2017-01-01

## 2017-10-20 PROBLEM — I50.22 CHRONIC SYSTOLIC HEART FAILURE (HCC): Chronic | Status: ACTIVE | Noted: 2017-01-01

## 2017-10-22 LAB
BACTERIA SPEC RESP CULT: ABNORMAL
BACTERIA SPEC RESP CULT: ABNORMAL
GRAM STN SPEC: ABNORMAL
MRSA SPEC QL CULT: NORMAL
VRE SPEC QL CULT: NORMAL

## 2017-10-23 LAB — ACINETOBACTER SCREEN CX: NO GROWTH

## 2017-10-25 LAB — BACTERIA SPEC AEROBE CULT: NORMAL
